# Patient Record
Sex: MALE | Race: OTHER | HISPANIC OR LATINO | URBAN - METROPOLITAN AREA
[De-identification: names, ages, dates, MRNs, and addresses within clinical notes are randomized per-mention and may not be internally consistent; named-entity substitution may affect disease eponyms.]

---

## 2017-05-24 ENCOUNTER — EMERGENCY (EMERGENCY)
Facility: HOSPITAL | Age: 82
LOS: 1 days | Discharge: HOME | End: 2017-05-24
Attending: EMERGENCY MEDICINE | Admitting: EMERGENCY MEDICINE
Payer: MEDICARE

## 2017-05-24 VITALS
SYSTOLIC BLOOD PRESSURE: 127 MMHG | HEART RATE: 63 BPM | OXYGEN SATURATION: 98 % | DIASTOLIC BLOOD PRESSURE: 53 MMHG | RESPIRATION RATE: 17 BRPM

## 2017-05-24 VITALS
TEMPERATURE: 98 F | OXYGEN SATURATION: 100 % | RESPIRATION RATE: 18 BRPM | DIASTOLIC BLOOD PRESSURE: 70 MMHG | SYSTOLIC BLOOD PRESSURE: 145 MMHG | HEART RATE: 69 BPM

## 2017-05-24 LAB
ALBUMIN SERPL ELPH-MCNC: 4 G/DL — SIGNIFICANT CHANGE UP (ref 3.3–5)
ALP SERPL-CCNC: 56 U/L — SIGNIFICANT CHANGE UP (ref 40–120)
ALT FLD-CCNC: 20 U/L — SIGNIFICANT CHANGE UP (ref 4–41)
AST SERPL-CCNC: 19 U/L — SIGNIFICANT CHANGE UP (ref 4–40)
BASOPHILS # BLD AUTO: 0.08 K/UL — SIGNIFICANT CHANGE UP (ref 0–0.2)
BASOPHILS NFR BLD AUTO: 1 % — SIGNIFICANT CHANGE UP (ref 0–2)
BILIRUB SERPL-MCNC: 0.4 MG/DL — SIGNIFICANT CHANGE UP (ref 0.2–1.2)
BUN SERPL-MCNC: 16 MG/DL — SIGNIFICANT CHANGE UP (ref 7–23)
CALCIUM SERPL-MCNC: 8.9 MG/DL — SIGNIFICANT CHANGE UP (ref 8.4–10.5)
CHLORIDE SERPL-SCNC: 106 MMOL/L — SIGNIFICANT CHANGE UP (ref 98–107)
CK SERPL-CCNC: 158 U/L — SIGNIFICANT CHANGE UP (ref 30–200)
CO2 SERPL-SCNC: 26 MMOL/L — SIGNIFICANT CHANGE UP (ref 22–31)
CREAT SERPL-MCNC: 1.1 MG/DL — SIGNIFICANT CHANGE UP (ref 0.5–1.3)
EOSINOPHIL # BLD AUTO: 0.18 K/UL — SIGNIFICANT CHANGE UP (ref 0–0.5)
EOSINOPHIL NFR BLD AUTO: 2.3 % — SIGNIFICANT CHANGE UP (ref 0–6)
GLUCOSE SERPL-MCNC: 119 MG/DL — HIGH (ref 70–99)
HCT VFR BLD CALC: 40.6 % — SIGNIFICANT CHANGE UP (ref 39–50)
HGB BLD-MCNC: 13.4 G/DL — SIGNIFICANT CHANGE UP (ref 13–17)
IMM GRANULOCYTES NFR BLD AUTO: 0.1 % — SIGNIFICANT CHANGE UP (ref 0–1.5)
LYMPHOCYTES # BLD AUTO: 1.51 K/UL — SIGNIFICANT CHANGE UP (ref 1–3.3)
LYMPHOCYTES # BLD AUTO: 19.3 % — SIGNIFICANT CHANGE UP (ref 13–44)
MAGNESIUM SERPL-MCNC: 1.9 MG/DL — SIGNIFICANT CHANGE UP (ref 1.6–2.6)
MCHC RBC-ENTMCNC: 33 % — SIGNIFICANT CHANGE UP (ref 32–36)
MCHC RBC-ENTMCNC: 33 PG — SIGNIFICANT CHANGE UP (ref 27–34)
MCV RBC AUTO: 100 FL — SIGNIFICANT CHANGE UP (ref 80–100)
MONOCYTES # BLD AUTO: 0.52 K/UL — SIGNIFICANT CHANGE UP (ref 0–0.9)
MONOCYTES NFR BLD AUTO: 6.6 % — SIGNIFICANT CHANGE UP (ref 2–14)
NEUTROPHILS # BLD AUTO: 5.53 K/UL — SIGNIFICANT CHANGE UP (ref 1.8–7.4)
NEUTROPHILS NFR BLD AUTO: 70.7 % — SIGNIFICANT CHANGE UP (ref 43–77)
PLATELET # BLD AUTO: 198 K/UL — SIGNIFICANT CHANGE UP (ref 150–400)
PMV BLD: 9.2 FL — SIGNIFICANT CHANGE UP (ref 7–13)
POTASSIUM SERPL-MCNC: 5.1 MMOL/L — SIGNIFICANT CHANGE UP (ref 3.5–5.3)
POTASSIUM SERPL-SCNC: 5.1 MMOL/L — SIGNIFICANT CHANGE UP (ref 3.5–5.3)
PROT SERPL-MCNC: 6.8 G/DL — SIGNIFICANT CHANGE UP (ref 6–8.3)
RBC # BLD: 4.06 M/UL — LOW (ref 4.2–5.8)
RBC # FLD: 12.7 % — SIGNIFICANT CHANGE UP (ref 10.3–14.5)
SODIUM SERPL-SCNC: 144 MMOL/L — SIGNIFICANT CHANGE UP (ref 135–145)
WBC # BLD: 7.83 K/UL — SIGNIFICANT CHANGE UP (ref 3.8–10.5)
WBC # FLD AUTO: 7.83 K/UL — SIGNIFICANT CHANGE UP (ref 3.8–10.5)

## 2017-05-24 PROCEDURE — 99285 EMERGENCY DEPT VISIT HI MDM: CPT | Mod: GC

## 2017-05-24 NOTE — ED ADULT TRIAGE NOTE - CHIEF COMPLAINT QUOTE
Pt c/o pain "all over", dizziness and fatigue x a few months. Pt c/o pain to upper back, b/l arms, b/l legs, b/l shoulders. Denies pain to chest area. Denies SOB. Intermittent dizziness. PMH arthritis, chronic back pain.

## 2017-05-24 NOTE — ED PROVIDER NOTE - MEDICAL DECISION MAKING DETAILS
pt with months of chronic diffuse joint pain with no change, no systemic symptoms. likely arthrosios. pt did not want xrays. will do labs, ck. if neg, will have him f/u with rheum and pmd.

## 2017-05-24 NOTE — ED PROVIDER NOTE - PROGRESS NOTE DETAILS
labs neg. d/c with pmd f/u.  The patient was given verbal and written discharge instructions. Specifically, instructions when to return to the ED and when to seek follow-up from their pcp was discussed. Any specialty follow-up was discussed, including how to make an appointment.  Instructions were discussed in simple, plain language and was understood by the patient. The patient understands that should their symptoms worsen or any new symptoms arise, they should return to the ED immediately for further evaluation.

## 2017-05-24 NOTE — ED ADULT NURSE NOTE - OBJECTIVE STATEMENT
81 years old male presents with complaints of chronic generalized pain that has been getting worse for the past week. Denies fever, chills or recent travel. On arrival to room 3, patient is alert and oriented x 4, appears comfortable, no use of accessory muscles noted, normal sinus rhythm on cardiac monitor, abdomen soft and nontender, skin is intact. No IV access needed as per Dr Mcneill. Blood drawn and sent. Will follow up and monitor.

## 2017-05-24 NOTE — ED PROVIDER NOTE - PMH
BPH (benign prostatic hypertrophy)    Chronic back pain    Herniated disc    Histoplasmosis    Hypertension    Hypothyroid    Insomnia    Renal cysts, acquired, bilateral

## 2017-05-24 NOTE — ED PROVIDER NOTE - OBJECTIVE STATEMENT
81m, pmhx pe on xarelto, htn, chronic back pain with lumbar disc arthrosis (last MRI 4 months ago). c/o years of intermittent joint pains, in b/l shoulders, hips and knees. intermittent x years but more frequent x 2 months. went to pain mgmt, had spine injection w/o relief. went to rheum, had some blood work that the pt has with him, all neg and was referred to a different MD (doesn't know what specialist). no acute change, but presents today to try to find out what is causing the pain. takes tylenol intermittently that sometimes helps and sometimes doesn't. no f/c, night sweats or wt loss. on ROS, only endorses 2 months of constipation, but no surgical history. no dizziness.

## 2018-02-08 ENCOUNTER — APPOINTMENT (OUTPATIENT)
Dept: SURGERY | Facility: CLINIC | Age: 83
End: 2018-02-08
Payer: MEDICARE

## 2018-02-08 VITALS
TEMPERATURE: 98.2 F | HEART RATE: 79 BPM | WEIGHT: 200 LBS | BODY MASS INDEX: 32.14 KG/M2 | SYSTOLIC BLOOD PRESSURE: 125 MMHG | HEIGHT: 66 IN | DIASTOLIC BLOOD PRESSURE: 70 MMHG | OXYGEN SATURATION: 96 %

## 2018-02-08 PROCEDURE — 99203 OFFICE O/P NEW LOW 30 MIN: CPT

## 2018-02-26 ENCOUNTER — OUTPATIENT (OUTPATIENT)
Dept: OUTPATIENT SERVICES | Facility: HOSPITAL | Age: 83
LOS: 1 days | End: 2018-02-26
Payer: COMMERCIAL

## 2018-02-26 VITALS
HEART RATE: 85 BPM | RESPIRATION RATE: 16 BRPM | OXYGEN SATURATION: 96 % | HEIGHT: 66 IN | SYSTOLIC BLOOD PRESSURE: 158 MMHG | DIASTOLIC BLOOD PRESSURE: 80 MMHG | TEMPERATURE: 98 F | WEIGHT: 199.96 LBS

## 2018-02-26 DIAGNOSIS — Z98.890 OTHER SPECIFIED POSTPROCEDURAL STATES: Chronic | ICD-10-CM

## 2018-02-26 DIAGNOSIS — Z01.818 ENCOUNTER FOR OTHER PREPROCEDURAL EXAMINATION: ICD-10-CM

## 2018-02-26 DIAGNOSIS — K81.9 CHOLECYSTITIS, UNSPECIFIED: ICD-10-CM

## 2018-02-26 DIAGNOSIS — M95.0 ACQUIRED DEFORMITY OF NOSE: Chronic | ICD-10-CM

## 2018-02-26 NOTE — H&P PST ADULT - NSANTHOSAYNRD_GEN_A_CORE
No. BUTCH screening performed.  STOP BANG Legend: 0-2 = LOW Risk; 3-4 = INTERMEDIATE Risk; 5-8 = HIGH Risk

## 2018-02-26 NOTE — H&P PST ADULT - PMH
BPH (benign prostatic hypertrophy)    Chronic back pain    Herniated disc    Histoplasmosis    Hypertension    Hypothyroid    Insomnia    Other pulmonary embolism without acute cor pulmonale, unspecified chronicity  off Xarelto x 2 years  Renal cysts, acquired, bilateral    Varicose veins

## 2018-02-26 NOTE — H&P PST ADULT - PROBLEM SELECTOR PLAN 1
Scheduled for laparoscopic cholecystectomy, possible open, possible cholangiogram on 2/28/2018. Preoperative instructions discussed and given to patient. Verbalized understanding of instructions

## 2018-02-26 NOTE — H&P PST ADULT - PSH
H/O surgical biopsy  lungs bilaterally  Nasal deviation  repaired  No significant past surgical history

## 2018-02-26 NOTE — H&P PST ADULT - NEGATIVE GENERAL SYMPTOMS
no chills/no sweating/no anorexia/no fever/no fatigue/no polyuria/no weight loss/no weight gain/no malaise/no polyphagia/no polydipsia

## 2018-02-26 NOTE — H&P PST ADULT - HISTORY OF PRESENT ILLNESS
83 y/o male with PMH of hypertension, hypothyroidism, [place] right ear, varicose veins (LLE), PE (right lung) now off Xarelto since 2015, osteoarthritis of multiple joints, BPH is here for presurgical evaluation. Complains of episodic pain "around the waist" radiating to lower back worsening over the past year. He was diagnosed with cholecystitis and is scheduled for laparoscopic cholecystectomy, possible open, possible cholangiogram on 2/28/2018

## 2018-02-26 NOTE — H&P PST ADULT - ASSESSMENT
83 y/o male with PMH of hypertension, hypothyroidism, [place] right ear, varicose veins (LLE), PE (right lung) now off Xarelto since 2015, osteoarthritis of multiple joints, BPH diagnosed with cholecystitis scheduled for laparoscopic cholecystectomy, possible open, possible cholangiogram on 2/28/2018. Patient is at moderate to high risk for planned procedure

## 2018-02-26 NOTE — H&P PST ADULT - RS GEN PE MLT RESP DETAILS PC
good air movement/no chest wall tenderness/no intercostal retractions/no rales/no wheezes/no rhonchi/no subcutaneous emphysema/breath sounds equal/airway patent/respirations non-labored/clear to auscultation bilaterally/normal

## 2018-02-26 NOTE — H&P PST ADULT - NEGATIVE CARDIOVASCULAR SYMPTOMS
no claudication/no dyspnea on exertion/no peripheral edema/no palpitations/no orthopnea/no chest pain/no paroxysmal nocturnal dyspnea

## 2018-02-28 ENCOUNTER — TRANSCRIPTION ENCOUNTER (OUTPATIENT)
Age: 83
End: 2018-02-28

## 2018-02-28 ENCOUNTER — INPATIENT (INPATIENT)
Facility: HOSPITAL | Age: 83
LOS: 0 days | Discharge: HOME | DRG: 419 | End: 2018-03-01
Attending: SPECIALIST | Admitting: SPECIALIST
Payer: COMMERCIAL

## 2018-02-28 ENCOUNTER — RESULT REVIEW (OUTPATIENT)
Age: 83
End: 2018-02-28

## 2018-02-28 VITALS
OXYGEN SATURATION: 96 % | HEART RATE: 96 BPM | RESPIRATION RATE: 17 BRPM | WEIGHT: 199.96 LBS | TEMPERATURE: 99 F | HEIGHT: 66 IN | DIASTOLIC BLOOD PRESSURE: 64 MMHG | SYSTOLIC BLOOD PRESSURE: 164 MMHG

## 2018-02-28 DIAGNOSIS — K81.9 CHOLECYSTITIS, UNSPECIFIED: ICD-10-CM

## 2018-02-28 DIAGNOSIS — Z98.890 OTHER SPECIFIED POSTPROCEDURAL STATES: Chronic | ICD-10-CM

## 2018-02-28 DIAGNOSIS — M95.0 ACQUIRED DEFORMITY OF NOSE: Chronic | ICD-10-CM

## 2018-02-28 PROCEDURE — G0463: CPT

## 2018-02-28 PROCEDURE — 47563 LAPARO CHOLECYSTECTOMY/GRAPH: CPT

## 2018-02-28 PROCEDURE — 49585: CPT | Mod: AS

## 2018-02-28 PROCEDURE — 86850 RBC ANTIBODY SCREEN: CPT

## 2018-02-28 PROCEDURE — 49585: CPT | Mod: 59

## 2018-02-28 PROCEDURE — 86901 BLOOD TYPING SEROLOGIC RH(D): CPT

## 2018-02-28 PROCEDURE — 88304 TISSUE EXAM BY PATHOLOGIST: CPT | Mod: 26

## 2018-02-28 PROCEDURE — 86900 BLOOD TYPING SEROLOGIC ABO: CPT

## 2018-02-28 PROCEDURE — 47563 LAPARO CHOLECYSTECTOMY/GRAPH: CPT | Mod: AS

## 2018-02-28 RX ORDER — ACETAMINOPHEN WITH CODEINE 300MG-30MG
1 TABLET ORAL EVERY 4 HOURS
Qty: 0 | Refills: 0 | Status: DISCONTINUED | OUTPATIENT
Start: 2018-02-28 | End: 2018-03-01

## 2018-02-28 RX ORDER — LOSARTAN POTASSIUM 100 MG/1
100 TABLET, FILM COATED ORAL DAILY
Qty: 0 | Refills: 0 | Status: DISCONTINUED | OUTPATIENT
Start: 2018-03-01 | End: 2018-03-01

## 2018-02-28 RX ORDER — SODIUM CHLORIDE 9 MG/ML
3 INJECTION INTRAMUSCULAR; INTRAVENOUS; SUBCUTANEOUS EVERY 8 HOURS
Qty: 0 | Refills: 0 | Status: DISCONTINUED | OUTPATIENT
Start: 2018-02-28 | End: 2018-02-28

## 2018-02-28 RX ORDER — TAMSULOSIN HYDROCHLORIDE 0.4 MG/1
0.4 CAPSULE ORAL ONCE
Qty: 0 | Refills: 0 | Status: COMPLETED | OUTPATIENT
Start: 2018-02-28 | End: 2018-02-28

## 2018-02-28 RX ORDER — DILTIAZEM HCL 120 MG
180 CAPSULE, EXT RELEASE 24 HR ORAL DAILY
Qty: 0 | Refills: 0 | Status: DISCONTINUED | OUTPATIENT
Start: 2018-03-01 | End: 2018-03-01

## 2018-02-28 RX ORDER — ONDANSETRON 8 MG/1
4 TABLET, FILM COATED ORAL ONCE
Qty: 0 | Refills: 0 | Status: DISCONTINUED | OUTPATIENT
Start: 2018-02-28 | End: 2018-03-01

## 2018-02-28 RX ORDER — SODIUM CHLORIDE 9 MG/ML
1000 INJECTION INTRAMUSCULAR; INTRAVENOUS; SUBCUTANEOUS
Qty: 0 | Refills: 0 | Status: DISCONTINUED | OUTPATIENT
Start: 2018-02-28 | End: 2018-03-01

## 2018-02-28 RX ORDER — ACETAMINOPHEN WITH CODEINE 300MG-30MG
1 TABLET ORAL
Qty: 6 | Refills: 0
Start: 2018-02-28

## 2018-02-28 RX ORDER — HYDROMORPHONE HYDROCHLORIDE 2 MG/ML
0.5 INJECTION INTRAMUSCULAR; INTRAVENOUS; SUBCUTANEOUS
Qty: 0 | Refills: 0 | Status: DISCONTINUED | OUTPATIENT
Start: 2018-02-28 | End: 2018-03-01

## 2018-02-28 RX ORDER — TAMSULOSIN HYDROCHLORIDE 0.4 MG/1
0.4 CAPSULE ORAL ONCE
Qty: 0 | Refills: 0 | Status: DISCONTINUED | OUTPATIENT
Start: 2018-03-01 | End: 2018-03-01

## 2018-02-28 RX ORDER — ACETAMINOPHEN WITH CODEINE 300MG-30MG
1 TABLET ORAL EVERY 4 HOURS
Qty: 0 | Refills: 0 | Status: DISCONTINUED | OUTPATIENT
Start: 2018-03-01 | End: 2018-03-01

## 2018-02-28 RX ORDER — TAMSULOSIN HYDROCHLORIDE 0.4 MG/1
0.4 CAPSULE ORAL AT BEDTIME
Qty: 0 | Refills: 0 | Status: DISCONTINUED | OUTPATIENT
Start: 2018-03-01 | End: 2018-03-01

## 2018-02-28 RX ADMIN — HYDROMORPHONE HYDROCHLORIDE 0.5 MILLIGRAM(S): 2 INJECTION INTRAMUSCULAR; INTRAVENOUS; SUBCUTANEOUS at 13:00

## 2018-02-28 RX ADMIN — Medication 1 TABLET(S): at 21:35

## 2018-02-28 RX ADMIN — HYDROMORPHONE HYDROCHLORIDE 0.5 MILLIGRAM(S): 2 INJECTION INTRAMUSCULAR; INTRAVENOUS; SUBCUTANEOUS at 13:30

## 2018-02-28 RX ADMIN — TAMSULOSIN HYDROCHLORIDE 0.4 MILLIGRAM(S): 0.4 CAPSULE ORAL at 17:02

## 2018-02-28 NOTE — CHART NOTE - NSCHARTNOTEFT_GEN_A_CORE
Informed by nursing pt not voided yet. pt took his flomax 1 hr ago. pt denies sensation to void.  No pain. Nurse reports bladder scan resulted in 300cc urine in bladder  Considering pt not sensation to void yet, and bladder capacity is not yet reached  (bladder capacity approx 500-600cc)   would push po fluids and wait for pt to void.  if pt unable to void in 60 to 90 minutes, informed him that he may need a mac and leg bag for discharge to home tonight.  signed out to covering surgical PA.

## 2018-02-28 NOTE — ASU DISCHARGE PLAN (ADULT/PEDIATRIC). - MEDICATION SUMMARY - MEDICATIONS TO TAKE
I will START or STAY ON the medications listed below when I get home from the hospital:    acetaminophen-codeine 300 mg-30 mg oral tablet  -- 1 tab(s) by mouth every 4 hours, As needed, Moderate Pain (4 - 6) MDD:4  -- Indication: For if pain    losartan 100 mg oral tablet  -- 1 tab(s) by mouth once a day  -- Indication: For .    tamsulosin 0.4 mg oral capsule  -- 1 cap(s) by mouth once a day  -- Indication: For .    dilTIAZem 180 mg/24 hours oral capsule, extended release  -- 1 cap(s) by mouth once a day  -- Indication: For .    zolpidem 10 mg oral tablet  -- 1 tab(s) by mouth once a day (at bedtime), As needed, Insomnia  -- Indication: For .    levothyroxine 100 mcg (0.1 mg) oral tablet  -- 1 tab(s) by mouth once a day  -- Indication: For .

## 2018-03-01 ENCOUNTER — TRANSCRIPTION ENCOUNTER (OUTPATIENT)
Age: 83
End: 2018-03-01

## 2018-03-01 VITALS
HEART RATE: 79 BPM | SYSTOLIC BLOOD PRESSURE: 143 MMHG | OXYGEN SATURATION: 96 % | DIASTOLIC BLOOD PRESSURE: 64 MMHG | RESPIRATION RATE: 18 BRPM | TEMPERATURE: 99 F

## 2018-03-01 PROCEDURE — 76000 FLUOROSCOPY <1 HR PHYS/QHP: CPT

## 2018-03-01 PROCEDURE — 88304 TISSUE EXAM BY PATHOLOGIST: CPT

## 2018-03-01 RX ORDER — PANTOPRAZOLE SODIUM 20 MG/1
40 TABLET, DELAYED RELEASE ORAL
Qty: 0 | Refills: 0 | Status: DISCONTINUED | OUTPATIENT
Start: 2018-03-01 | End: 2018-03-01

## 2018-03-01 RX ORDER — ZOLPIDEM TARTRATE 10 MG/1
5 TABLET ORAL ONCE
Qty: 0 | Refills: 0 | Status: DISCONTINUED | OUTPATIENT
Start: 2018-03-01 | End: 2018-03-01

## 2018-03-01 RX ORDER — SODIUM CHLORIDE 9 MG/ML
1000 INJECTION, SOLUTION INTRAVENOUS
Qty: 0 | Refills: 0 | Status: DISCONTINUED | OUTPATIENT
Start: 2018-03-01 | End: 2018-03-01

## 2018-03-01 RX ORDER — TAMSULOSIN HYDROCHLORIDE 0.4 MG/1
2 CAPSULE ORAL
Qty: 60 | Refills: 0
Start: 2018-03-01 | End: 2018-03-30

## 2018-03-01 RX ORDER — LEVOTHYROXINE SODIUM 125 MCG
100 TABLET ORAL DAILY
Qty: 0 | Refills: 0 | Status: DISCONTINUED | OUTPATIENT
Start: 2018-03-01 | End: 2018-03-01

## 2018-03-01 RX ADMIN — Medication 100 MICROGRAM(S): at 12:20

## 2018-03-01 RX ADMIN — PANTOPRAZOLE SODIUM 40 MILLIGRAM(S): 20 TABLET, DELAYED RELEASE ORAL at 12:20

## 2018-03-01 RX ADMIN — ZOLPIDEM TARTRATE 5 MILLIGRAM(S): 10 TABLET ORAL at 00:57

## 2018-03-01 RX ADMIN — Medication 180 MILLIGRAM(S): at 06:25

## 2018-03-01 RX ADMIN — LOSARTAN POTASSIUM 100 MILLIGRAM(S): 100 TABLET, FILM COATED ORAL at 06:25

## 2018-03-01 NOTE — DISCHARGE NOTE ADULT - PLAN OF CARE
wound healing May shower. Remove dressing in 48 hrs. Leave steristrips intact. Resume regular diet. No heavy lifting, straining, exercises for 2 weeks. increase Flomax to 2 capsule daily  follow up with Dr. Ayala 3/6/18  Keep mac to leg bag, drain when full. Record amount each time. con't med

## 2018-03-01 NOTE — DISCHARGE NOTE ADULT - PATIENT PORTAL LINK FT
You can access the [facility] Patient Portal, offered by [facility], by registering with the following website: [email or web address]

## 2018-03-01 NOTE — PROGRESS NOTE ADULT - ASSESSMENT
83 y/o Male s/p lap hal umbilical hernia repair 2/28, post op urinary retention     -reg diet   -c/w mac   -OOB/Ambulate   -DVT ppx   -Dc planning w/ mac catheter

## 2018-03-01 NOTE — DISCHARGE NOTE ADULT - CARE PLAN
Principal Discharge DX:	Cholelithiasis  Goal:	wound healing  Assessment and plan of treatment:	May shower. Remove dressing in 48 hrs. Leave steristrips intact. Resume regular diet. No heavy lifting, straining, exercises for 2 weeks.  Secondary Diagnosis:	BPH (benign prostatic hyperplasia)  Assessment and plan of treatment:	increase Flomax to 2 capsule daily  follow up with Dr. Ayala 3/6/18  Keep mac to leg bag, drain when full. Record amount each time.  Secondary Diagnosis:	Hypertension  Assessment and plan of treatment:	con't med  Secondary Diagnosis:	Hypothyroidism  Assessment and plan of treatment:	con't med

## 2018-03-01 NOTE — PROGRESS NOTE ADULT - SUBJECTIVE AND OBJECTIVE BOX
INTERVAL HPI/OVERNIGHT EVENTS:  Pt stable.   Tolerating diet.   flatus/BM.  Pizarro in place    Vital Signs Last 24 Hrs  T(C): 36.4 (01 Mar 2018 06:46), Max: 97.4 (28 Feb 2018 14:04)  T(F): 97.6 (01 Mar 2018 06:46), Max: 98 (28 Feb 2018 12:07)  HR: 72 (01 Mar 2018 06:46) (70 - 98)  BP: 120/54 (01 Mar 2018 06:46) (106/64 - 146/65)  BP(mean): 78 (28 Feb 2018 13:30) (74 - 78)  RR: 18 (01 Mar 2018 06:46) (10 - 21)  SpO2: 98% (01 Mar 2018 06:46) (95% - 100%)    Physical:  Abdomen: Soft nondistended, nontender.  Port sites clean  No complaints    I&O's Summary    28 Feb 2018 07:01  -  01 Mar 2018 07:00  --------------------------------------------------------  IN: 1820 mL / OUT: 1000 mL / NET: 820 mL        LABS:

## 2018-03-01 NOTE — DISCHARGE NOTE ADULT - HOSPITAL COURSE
82 y.o. M with PMH significant for BPH underwent uncomplicated laparoscopic cholecystectomy 2/28/18. Post op patient was unable to void, mac catheter placed with 1L UO. Pt was schedule to see urologist Dr. Ayala 3/13/18. Dr. Ayala saw pt at bedside and recommended d/c home with mac to leg bag, increase Flomax to 0.8mg daily, and follow up 3/6/18.

## 2018-03-01 NOTE — PROGRESS NOTE ADULT - SUBJECTIVE AND OBJECTIVE BOX
INTERVAL HPI/OVERNIGHT EVENTS:    Pt s/p lap hal umbilical hernia repair 2/28, seen and examined at bedside. Admits to incisional pain, denies n/v. on reg diet tolerating well.     Vital Signs Last 24 Hrs  T(C): 36.4 (01 Mar 2018 06:46), Max: 97.4 (28 Feb 2018 14:04)  T(F): 97.6 (01 Mar 2018 06:46), Max: 98 (28 Feb 2018 12:07)  HR: 72 (01 Mar 2018 06:46) (70 - 98)  BP: 120/54 (01 Mar 2018 06:46) (106/64 - 146/65)  BP(mean): 78 (28 Feb 2018 13:30) (74 - 78)  RR: 18 (01 Mar 2018 06:46) (10 - 21)  SpO2: 98% (01 Mar 2018 06:46) (95% - 100%)  I&O's Detail    28 Feb 2018 07:01  -  01 Mar 2018 07:00  --------------------------------------------------------  IN:    IV PiggyBack: 1150 mL    Oral Fluid: 620 mL    sodium chloride 0.9%.: 50 mL  Total IN: 1820 mL    OUT:    Indwelling Catheter - Urethral: 1000 mL  Total OUT: 1000 mL    Total NET: 820 mL      Physical Exam  General: AAOx3, No acute distress  Skin: No jaundice, no icterus  Abdomen: soft,  nondistended, incisional TTP  : Normal external genitalia, mac in place, UO yellow   Extremities: non edematous, no calf pain bilaterally

## 2018-03-01 NOTE — DISCHARGE NOTE ADULT - MEDICATION SUMMARY - MEDICATIONS TO CHANGE
I will SWITCH the dose or number of times a day I take the medications listed below when I get home from the hospital:    tamsulosin 0.4 mg oral capsule  -- 1 cap(s) by mouth once a day

## 2018-03-01 NOTE — DISCHARGE NOTE ADULT - MEDICATION SUMMARY - MEDICATIONS TO TAKE
I will START or STAY ON the medications listed below when I get home from the hospital:    acetaminophen-codeine 300 mg-30 mg oral tablet  -- 1 tab(s) by mouth every 4 hours, As needed, Moderate Pain (4 - 6) MDD:4  -- Indication: For Moderate pain    losartan 100 mg oral tablet  -- 1 tab(s) by mouth once a day  -- Indication: For HTN    tamsulosin 0.4 mg oral capsule  -- 2 cap(s) by mouth once a day   -- It is very important that you take or use this exactly as directed.  Do not skip doses or discontinue unless directed by your doctor.  May cause drowsiness.  Alcohol may intensify this effect.  Use care when operating dangerous machinery.  Some non-prescription drugs may aggravate your condition.  Read all labels carefully.  If a warning appears, check with your doctor before taking.  Swallow whole.  Do not crush.  Take with food or milk.    -- Indication: For Urinary retention    dilTIAZem 180 mg/24 hours oral capsule, extended release  -- 1 cap(s) by mouth once a day  -- Indication: For per pmd    zolpidem 10 mg oral tablet  -- 1 tab(s) by mouth once a day (at bedtime), As needed, Insomnia  -- Indication: For Insomnia    levothyroxine 100 mcg (0.1 mg) oral tablet  -- 1 tab(s) by mouth once a day  -- Indication: For Hypothyroidism

## 2018-03-05 LAB — SURGICAL PATHOLOGY FINAL REPORT - CH: SIGNIFICANT CHANGE UP

## 2018-03-06 ENCOUNTER — APPOINTMENT (OUTPATIENT)
Dept: UROLOGY | Facility: CLINIC | Age: 83
End: 2018-03-06
Payer: MEDICARE

## 2018-03-06 VITALS
OXYGEN SATURATION: 98 % | DIASTOLIC BLOOD PRESSURE: 78 MMHG | BODY MASS INDEX: 32.14 KG/M2 | WEIGHT: 200 LBS | TEMPERATURE: 98 F | HEART RATE: 106 BPM | HEIGHT: 66 IN | RESPIRATION RATE: 18 BRPM | SYSTOLIC BLOOD PRESSURE: 136 MMHG

## 2018-03-06 DIAGNOSIS — Z82.49 FAMILY HISTORY OF ISCHEMIC HEART DISEASE AND OTHER DISEASES OF THE CIRCULATORY SYSTEM: ICD-10-CM

## 2018-03-06 DIAGNOSIS — N40.0 BENIGN PROSTATIC HYPERPLASIA WITHOUT LOWER URINARY TRACT SYMPMS: ICD-10-CM

## 2018-03-06 PROCEDURE — 99204 OFFICE O/P NEW MOD 45 MIN: CPT

## 2018-03-06 RX ORDER — ZOLPIDEM TARTRATE 10 MG/1
10 TABLET ORAL
Refills: 0 | Status: ACTIVE | COMMUNITY

## 2018-03-06 RX ORDER — LEVOTHYROXINE SODIUM 0.1 MG/1
100 TABLET ORAL
Refills: 0 | Status: ACTIVE | COMMUNITY

## 2018-03-06 RX ORDER — LOSARTAN POTASSIUM 100 MG/1
100 TABLET, FILM COATED ORAL
Refills: 0 | Status: ACTIVE | COMMUNITY

## 2018-03-06 RX ORDER — DILTIAZEM HYDROCHLORIDE 180 MG/1
180 CAPSULE, EXTENDED RELEASE ORAL
Refills: 0 | Status: ACTIVE | COMMUNITY

## 2018-03-13 ENCOUNTER — APPOINTMENT (OUTPATIENT)
Dept: UROLOGY | Facility: CLINIC | Age: 83
End: 2018-03-13
Payer: MEDICARE

## 2018-03-13 VITALS
WEIGHT: 195 LBS | SYSTOLIC BLOOD PRESSURE: 138 MMHG | HEART RATE: 94 BPM | HEIGHT: 66 IN | BODY MASS INDEX: 31.34 KG/M2 | TEMPERATURE: 97.6 F | DIASTOLIC BLOOD PRESSURE: 64 MMHG

## 2018-03-13 DIAGNOSIS — Z87.19 PERSONAL HISTORY OF OTHER DISEASES OF THE DIGESTIVE SYSTEM: ICD-10-CM

## 2018-03-13 PROCEDURE — 99214 OFFICE O/P EST MOD 30 MIN: CPT

## 2018-03-14 LAB
APPEARANCE: ABNORMAL
BACTERIA: ABNORMAL
BILIRUBIN URINE: NEGATIVE
BLOOD URINE: NEGATIVE
COLOR: ABNORMAL
GLUCOSE QUALITATIVE U: NEGATIVE MG/DL
HYALINE CASTS: 4 /LPF
KETONES URINE: NEGATIVE
LEUKOCYTE ESTERASE URINE: ABNORMAL
MICROSCOPIC-UA: NORMAL
NITRITE URINE: POSITIVE
PH URINE: >8.5
PROTEIN URINE: 30 MG/DL
RED BLOOD CELLS URINE: 2 /HPF
SPECIFIC GRAVITY URINE: 1.02
SQUAMOUS EPITHELIAL CELLS: 0 /HPF
UROBILINOGEN URINE: 1 MG/DL
WHITE BLOOD CELLS URINE: 8 /HPF

## 2018-03-15 ENCOUNTER — APPOINTMENT (OUTPATIENT)
Dept: SURGERY | Facility: CLINIC | Age: 83
End: 2018-03-15
Payer: MEDICARE

## 2018-03-15 VITALS
HEIGHT: 66 IN | BODY MASS INDEX: 31.34 KG/M2 | SYSTOLIC BLOOD PRESSURE: 127 MMHG | WEIGHT: 195 LBS | DIASTOLIC BLOOD PRESSURE: 73 MMHG | TEMPERATURE: 97.3 F | HEART RATE: 94 BPM

## 2018-03-15 PROCEDURE — 99024 POSTOP FOLLOW-UP VISIT: CPT

## 2018-03-16 ENCOUNTER — RESULT REVIEW (OUTPATIENT)
Age: 83
End: 2018-03-16

## 2018-03-16 LAB — BACTERIA UR CULT: ABNORMAL

## 2018-03-27 ENCOUNTER — APPOINTMENT (OUTPATIENT)
Dept: UROLOGY | Facility: CLINIC | Age: 83
End: 2018-03-27
Payer: MEDICARE

## 2018-03-27 VITALS
HEART RATE: 76 BPM | OXYGEN SATURATION: 98 % | SYSTOLIC BLOOD PRESSURE: 132 MMHG | DIASTOLIC BLOOD PRESSURE: 76 MMHG | RESPIRATION RATE: 16 BRPM

## 2018-03-27 DIAGNOSIS — Z86.39 PERSONAL HISTORY OF OTHER ENDOCRINE, NUTRITIONAL AND METABOLIC DISEASE: ICD-10-CM

## 2018-03-27 DIAGNOSIS — R30.0 DYSURIA: ICD-10-CM

## 2018-03-27 DIAGNOSIS — Z87.39 PERSONAL HISTORY OF OTHER DISEASES OF THE MUSCULOSKELETAL SYSTEM AND CONNECTIVE TISSUE: ICD-10-CM

## 2018-03-27 DIAGNOSIS — Z86.79 PERSONAL HISTORY OF OTHER DISEASES OF THE CIRCULATORY SYSTEM: ICD-10-CM

## 2018-03-27 DIAGNOSIS — Z87.19 PERSONAL HISTORY OF OTHER DISEASES OF THE DIGESTIVE SYSTEM: ICD-10-CM

## 2018-03-27 PROCEDURE — 99213 OFFICE O/P EST LOW 20 MIN: CPT

## 2018-03-27 PROCEDURE — 51798 US URINE CAPACITY MEASURE: CPT

## 2019-03-18 PROBLEM — I83.90 ASYMPTOMATIC VARICOSE VEINS OF UNSPECIFIED LOWER EXTREMITY: Chronic | Status: ACTIVE | Noted: 2018-02-26

## 2019-03-18 PROBLEM — I26.99 OTHER PULMONARY EMBOLISM WITHOUT ACUTE COR PULMONALE: Chronic | Status: ACTIVE | Noted: 2018-02-26

## 2019-03-22 ENCOUNTER — APPOINTMENT (OUTPATIENT)
Dept: UROLOGY | Facility: CLINIC | Age: 84
End: 2019-03-22
Payer: MEDICARE

## 2019-03-22 VITALS
DIASTOLIC BLOOD PRESSURE: 71 MMHG | SYSTOLIC BLOOD PRESSURE: 136 MMHG | HEART RATE: 77 BPM | WEIGHT: 190 LBS | BODY MASS INDEX: 30.53 KG/M2 | HEIGHT: 66 IN

## 2019-03-22 DIAGNOSIS — M54.5 LOW BACK PAIN: ICD-10-CM

## 2019-03-22 PROCEDURE — 99214 OFFICE O/P EST MOD 30 MIN: CPT

## 2019-03-22 NOTE — ASSESSMENT
[FreeTextEntry1] : Very pleasant 83-year-old gentleman presents for followup of kidney stones, renal cysts, BPH\par -Recent PSA 3.2\par -We discussed that his risk for prostate cancer is low, given his negative YAEL and low PSA\par -I suggested he undergo a renal ultrasound given his concern for renal cysts and stones\par -Patient reports that he would like to wait at this time\par -Continue Flomax-refill sent to pharmacy\par -We discussed the potential addition of finasteride, however he would not like to add another medication at this time\par -Followup in 6 months

## 2019-03-22 NOTE — HISTORY OF PRESENT ILLNESS
[FreeTextEntry1] : Very pleasant 83-year-old gentleman who presents for followup of renal cysts, renal stones, BPH. Patient reports urinating every 2-3 hours. He does report that he drinks a lot of water because of kidney stones. He reports right flank pain which has been present for many years. He denies dysuria. No hematuria. No specific timing to his symptoms. No aggravating or alleviating factors that he knows of. No other complaints. [Urinary Retention] : no urinary retention [Urinary Urgency] : no urinary urgency [Urinary Frequency] : urinary frequency [Straining] : no straining [Weak Stream] : no weak stream [Dysuria] : no dysuria [Hematuria - Gross] : no gross hematuria [Bladder Spasm] : no bladder spasm [Abdominal Pain] : no abdominal pain [Flank Pain] : no flank pain [Fever] : no fever [Fatigue] : no fatigue [Nausea] : no nausea [Anorexia] : no anorexia

## 2019-05-15 ENCOUNTER — APPOINTMENT (OUTPATIENT)
Dept: UROLOGY | Facility: CLINIC | Age: 84
End: 2019-05-15
Payer: MEDICARE

## 2019-05-15 VITALS
DIASTOLIC BLOOD PRESSURE: 79 MMHG | HEART RATE: 104 BPM | WEIGHT: 190 LBS | BODY MASS INDEX: 30.53 KG/M2 | SYSTOLIC BLOOD PRESSURE: 143 MMHG | HEIGHT: 66 IN

## 2019-05-15 PROCEDURE — 99214 OFFICE O/P EST MOD 30 MIN: CPT

## 2019-05-15 NOTE — PHYSICAL EXAM
[General Appearance - Well Developed] : well developed [General Appearance - Well Nourished] : well nourished [Normal Appearance] : normal appearance [Well Groomed] : well groomed [General Appearance - In No Acute Distress] : no acute distress [Abdomen Tenderness] : non-tender [Abdomen Soft] : soft [Costovertebral Angle Tenderness] : no ~M costovertebral angle tenderness [Urinary Bladder Findings] : the bladder was normal on palpation [Edema] : no peripheral edema [] : no respiratory distress [Respiration, Rhythm And Depth] : normal respiratory rhythm and effort [Exaggerated Use Of Accessory Muscles For Inspiration] : no accessory muscle use [Oriented To Time, Place, And Person] : oriented to person, place, and time [Affect] : the affect was normal [Mood] : the mood was normal [Not Anxious] : not anxious [Normal Station and Gait] : the gait and station were normal for the patient's age [No Focal Deficits] : no focal deficits [No Palpable Adenopathy] : no palpable adenopathy

## 2019-05-15 NOTE — HISTORY OF PRESENT ILLNESS
[FreeTextEntry1] : Very pleasant 83-year-old gentleman presents for followup of kidney cysts, kidney stones, question of a renal mass on recent ultrasound. Patient recently had a renal ultrasound done for persistent flank pain and back pain. This demonstrated a possible 4 mm kidney stone as well as a cortical calcification. It also showed bilateral renal cysts with a concern for a complex cyst versus a renal mass. Previous CT scan from 2016 demonstrated only parapelvic cysts and a cortical calcification. He denies dysuria. No suprapubic pain. No specific timing his symptoms. No other complaints. [Urinary Retention] : no urinary retention [Urinary Urgency] : no urinary urgency [Urinary Frequency] : urinary frequency [Straining] : no straining [Weak Stream] : no weak stream [Dysuria] : no dysuria [Hematuria - Gross] : no gross hematuria [Bladder Spasm] : no bladder spasm [Abdominal Pain] : no abdominal pain [Flank Pain] : no flank pain [Fever] : no fever [Nausea] : no nausea [Fatigue] : no fatigue [Anorexia] : no anorexia

## 2019-05-15 NOTE — ASSESSMENT
[FreeTextEntry1] : Very pleasant 83-year-old gentleman presents for followup of bilateral renal cysts, possible kidney stones, possible renal mass\par -Renal ultrasound images reviewed from [facility]\par -CT images from 2016 reviewed\par -CT renal mass protocol to rule out renal mass and kidney stones\par -Labs from a CT reviewed-creatinine normal, PSA 3.2\par -Follow up in 3 weeks

## 2019-05-29 ENCOUNTER — APPOINTMENT (OUTPATIENT)
Dept: UROLOGY | Facility: CLINIC | Age: 84
End: 2019-05-29
Payer: MEDICARE

## 2019-05-29 VITALS
DIASTOLIC BLOOD PRESSURE: 84 MMHG | HEIGHT: 66 IN | WEIGHT: 190 LBS | HEART RATE: 101 BPM | BODY MASS INDEX: 30.53 KG/M2 | RESPIRATION RATE: 16 BRPM | SYSTOLIC BLOOD PRESSURE: 156 MMHG

## 2019-05-29 PROCEDURE — 99213 OFFICE O/P EST LOW 20 MIN: CPT

## 2019-05-29 NOTE — HISTORY OF PRESENT ILLNESS
[Urinary Retention] : no urinary retention [FreeTextEntry1] : Very pleasant 83-year-old gentleman presents for followup of renal cysts and BPH. Patient feels well except for back pain. He denies dysuria. No hematuria. No overt flank pain. He recently underwent a CT which demonstrated renal cysts and a cortical renal calcification. No renal masses. No kidney stones. [Urinary Frequency] : urinary frequency [Urinary Urgency] : no urinary urgency [Straining] : no straining [Weak Stream] : no weak stream [Hematuria - Gross] : no gross hematuria [Dysuria] : no dysuria [Bladder Spasm] : no bladder spasm [Abdominal Pain] : no abdominal pain [Flank Pain] : no flank pain [Fever] : no fever [Nausea] : no nausea [Fatigue] : no fatigue [Anorexia] : no anorexia

## 2019-05-29 NOTE — PHYSICAL EXAM
[General Appearance - Well Developed] : well developed [General Appearance - Well Nourished] : well nourished [Normal Appearance] : normal appearance [General Appearance - In No Acute Distress] : no acute distress [Well Groomed] : well groomed [Abdomen Soft] : soft [Abdomen Tenderness] : non-tender [Costovertebral Angle Tenderness] : no ~M costovertebral angle tenderness [Urinary Bladder Findings] : the bladder was normal on palpation [Edema] : no peripheral edema [] : no respiratory distress [Respiration, Rhythm And Depth] : normal respiratory rhythm and effort [Exaggerated Use Of Accessory Muscles For Inspiration] : no accessory muscle use [Mood] : the mood was normal [Affect] : the affect was normal [Oriented To Time, Place, And Person] : oriented to person, place, and time [Normal Station and Gait] : the gait and station were normal for the patient's age [Not Anxious] : not anxious [No Focal Deficits] : no focal deficits [No Palpable Adenopathy] : no palpable adenopathy

## 2019-05-29 NOTE — ASSESSMENT
[FreeTextEntry1] : Very pleasant 83-year-old gentleman with renal cysts, renal cortical calcification, and BPH\par -CT images reviewed\par -Follow up in one year with ultrasound prior to visit\par -continue Flomax

## 2019-09-19 ENCOUNTER — EMERGENCY (EMERGENCY)
Facility: HOSPITAL | Age: 84
LOS: 1 days | Discharge: HOME | End: 2019-09-19
Attending: EMERGENCY MEDICINE
Payer: COMMERCIAL

## 2019-09-19 VITALS
TEMPERATURE: 97 F | WEIGHT: 199.96 LBS | RESPIRATION RATE: 20 BRPM | OXYGEN SATURATION: 97 % | DIASTOLIC BLOOD PRESSURE: 72 MMHG | HEART RATE: 73 BPM | HEIGHT: 67 IN | SYSTOLIC BLOOD PRESSURE: 155 MMHG

## 2019-09-19 DIAGNOSIS — M95.0 ACQUIRED DEFORMITY OF NOSE: Chronic | ICD-10-CM

## 2019-09-19 DIAGNOSIS — Z98.890 OTHER SPECIFIED POSTPROCEDURAL STATES: Chronic | ICD-10-CM

## 2019-09-19 PROCEDURE — 72100 X-RAY EXAM L-S SPINE 2/3 VWS: CPT | Mod: 26

## 2019-09-19 PROCEDURE — 99283 EMERGENCY DEPT VISIT LOW MDM: CPT

## 2019-09-19 PROCEDURE — 72100 X-RAY EXAM L-S SPINE 2/3 VWS: CPT

## 2019-09-19 PROCEDURE — 99283 EMERGENCY DEPT VISIT LOW MDM: CPT | Mod: 25

## 2019-09-19 RX ORDER — DIAZEPAM 5 MG
5 TABLET ORAL ONCE
Refills: 0 | Status: DISCONTINUED | OUTPATIENT
Start: 2019-09-19 | End: 2019-09-19

## 2019-09-19 RX ORDER — DIAZEPAM 5 MG
1 TABLET ORAL
Qty: 3 | Refills: 0
Start: 2019-09-19 | End: 2019-09-21

## 2019-09-19 RX ORDER — LIDOCAINE 4 G/100G
1 CREAM TOPICAL ONCE
Refills: 0 | Status: COMPLETED | OUTPATIENT
Start: 2019-09-19 | End: 2019-09-19

## 2019-09-19 RX ADMIN — Medication 5 MILLIGRAM(S): at 18:05

## 2019-09-19 RX ADMIN — LIDOCAINE 1 PATCH: 4 CREAM TOPICAL at 18:05

## 2019-09-19 NOTE — ED PROVIDER NOTE - OBJECTIVE STATEMENT
84 yr old male with hx of cholecystectomy, ventral hernia, PE of xarelto, varicose vein, chronic back pain, herniated disc, BPH, renal cyst, insomnia, hypothyroid and HTN presents to ed c/o b/l lateral lower back pain x 3 days that radiates to lateral thigh down to legs.  nothing makes it worse or better.  at night seems to be at lateral thigh.  no urinary or bowel incontinence, no fever, no abd pain, no n/v, no dysuria, no syncope, no focal weakness, no fall.  pt takes tylenol and naproxen and not helping

## 2019-09-19 NOTE — ED PROVIDER NOTE - CARDIAC, MLM
Normal rate, regular rhythm.  Heart sounds S1, S2.  No murmurs, rubs or gallops. 2+ Dp and femoral pulses

## 2019-09-19 NOTE — ED PROVIDER NOTE - PROGRESS NOTE DETAILS
mcconnell: improve.  pt xr shows spondylosis and vascular calcifications. no acute path  dx chronic back pain.  physical therapy, heat therapy, no heavy lifting. tylenol 650mg every 4 hrs as needed. ambulatory

## 2019-09-19 NOTE — ED PROVIDER NOTE - CLINICAL SUMMARY MEDICAL DECISION MAKING FREE TEXT BOX
84 yr old male with hx of cholecystectomy, ventral hernia, PE of xarelto, varicose vein, chronic back pain, herniated disc, BPH, renal cyst, insomnia, hypothyroid and HTN presents to ed c/o b/l lateral lower back pain x 3 days that radiates to lateral thigh down to legs.  nothing makes it worse or better.  at night seems to be at lateral thigh.  no urinary or bowel incontinence, no fever, no abd pain, no n/v, no dysuria, no syncope, no focal weakness, no fall.  pt takes tylenol and naproxen and not helping    pt with no back pain.  no vascular compromise, no infectious sx, no sign of cord compression.  will check xr and ua, pain meds re-assess likely dc

## 2019-09-19 NOTE — ED PROVIDER NOTE - PATIENT PORTAL LINK FT
You can access the FollowMyHealth Patient Portal offered by [facility] by registering at the following website: [email or web address]. By joining [employer]’s FollowMyHealth portal, you will also be able to view your health information using other applications (apps) compatible with our system.

## 2019-10-10 ENCOUNTER — INPATIENT (INPATIENT)
Facility: HOSPITAL | Age: 84
LOS: 3 days | Discharge: HOME | DRG: 728 | End: 2019-10-14
Attending: STUDENT IN AN ORGANIZED HEALTH CARE EDUCATION/TRAINING PROGRAM | Admitting: STUDENT IN AN ORGANIZED HEALTH CARE EDUCATION/TRAINING PROGRAM
Payer: COMMERCIAL

## 2019-10-10 VITALS
OXYGEN SATURATION: 96 % | SYSTOLIC BLOOD PRESSURE: 145 MMHG | WEIGHT: 190.04 LBS | RESPIRATION RATE: 18 BRPM | HEIGHT: 66 IN | TEMPERATURE: 98 F | HEART RATE: 91 BPM | DIASTOLIC BLOOD PRESSURE: 71 MMHG

## 2019-10-10 DIAGNOSIS — M95.0 ACQUIRED DEFORMITY OF NOSE: Chronic | ICD-10-CM

## 2019-10-10 DIAGNOSIS — Z98.890 OTHER SPECIFIED POSTPROCEDURAL STATES: Chronic | ICD-10-CM

## 2019-10-10 LAB
ALBUMIN SERPL ELPH-MCNC: 2.7 G/DL — LOW (ref 3.5–5)
ALP SERPL-CCNC: 52 U/L — SIGNIFICANT CHANGE UP (ref 40–120)
ALT FLD-CCNC: 20 U/L DA — SIGNIFICANT CHANGE UP (ref 10–60)
ANION GAP SERPL CALC-SCNC: 6 MMOL/L — SIGNIFICANT CHANGE UP (ref 5–17)
APTT BLD: 28.4 SEC — SIGNIFICANT CHANGE UP (ref 27.5–36.3)
AST SERPL-CCNC: 15 U/L — SIGNIFICANT CHANGE UP (ref 10–40)
BASOPHILS # BLD AUTO: 0.04 K/UL — SIGNIFICANT CHANGE UP (ref 0–0.2)
BASOPHILS NFR BLD AUTO: 0.3 % — SIGNIFICANT CHANGE UP (ref 0–2)
BILIRUB SERPL-MCNC: 0.6 MG/DL — SIGNIFICANT CHANGE UP (ref 0.2–1.2)
BUN SERPL-MCNC: 25 MG/DL — HIGH (ref 7–18)
CALCIUM SERPL-MCNC: 8.2 MG/DL — LOW (ref 8.4–10.5)
CHLORIDE SERPL-SCNC: 103 MMOL/L — SIGNIFICANT CHANGE UP (ref 96–108)
CO2 SERPL-SCNC: 27 MMOL/L — SIGNIFICANT CHANGE UP (ref 22–31)
CREAT SERPL-MCNC: 1.45 MG/DL — HIGH (ref 0.5–1.3)
EOSINOPHIL # BLD AUTO: 0.01 K/UL — SIGNIFICANT CHANGE UP (ref 0–0.5)
EOSINOPHIL NFR BLD AUTO: 0.1 % — SIGNIFICANT CHANGE UP (ref 0–6)
GLUCOSE SERPL-MCNC: 169 MG/DL — HIGH (ref 70–99)
HCT VFR BLD CALC: 34.2 % — LOW (ref 39–50)
HGB BLD-MCNC: 11.2 G/DL — LOW (ref 13–17)
IMM GRANULOCYTES NFR BLD AUTO: 0.9 % — SIGNIFICANT CHANGE UP (ref 0–1.5)
INR BLD: 1.13 RATIO — SIGNIFICANT CHANGE UP (ref 0.88–1.16)
LYMPHOCYTES # BLD AUTO: 0.59 K/UL — LOW (ref 1–3.3)
LYMPHOCYTES # BLD AUTO: 4.6 % — LOW (ref 13–44)
MCHC RBC-ENTMCNC: 32.7 GM/DL — SIGNIFICANT CHANGE UP (ref 32–36)
MCHC RBC-ENTMCNC: 33 PG — SIGNIFICANT CHANGE UP (ref 27–34)
MCV RBC AUTO: 100.9 FL — HIGH (ref 80–100)
MONOCYTES # BLD AUTO: 1.05 K/UL — HIGH (ref 0–0.9)
MONOCYTES NFR BLD AUTO: 8.1 % — SIGNIFICANT CHANGE UP (ref 2–14)
NEUTROPHILS # BLD AUTO: 11.12 K/UL — HIGH (ref 1.8–7.4)
NEUTROPHILS NFR BLD AUTO: 86 % — HIGH (ref 43–77)
NRBC # BLD: 0 /100 WBCS — SIGNIFICANT CHANGE UP (ref 0–0)
PLATELET # BLD AUTO: 126 K/UL — LOW (ref 150–400)
POTASSIUM SERPL-MCNC: 3.6 MMOL/L — SIGNIFICANT CHANGE UP (ref 3.5–5.3)
POTASSIUM SERPL-SCNC: 3.6 MMOL/L — SIGNIFICANT CHANGE UP (ref 3.5–5.3)
PROT SERPL-MCNC: 6.4 G/DL — SIGNIFICANT CHANGE UP (ref 6–8.3)
PROTHROM AB SERPL-ACNC: 12.6 SEC — SIGNIFICANT CHANGE UP (ref 10–12.9)
RBC # BLD: 3.39 M/UL — LOW (ref 4.2–5.8)
RBC # FLD: 13.1 % — SIGNIFICANT CHANGE UP (ref 10.3–14.5)
SODIUM SERPL-SCNC: 136 MMOL/L — SIGNIFICANT CHANGE UP (ref 135–145)
TROPONIN I SERPL-MCNC: <0.015 NG/ML — SIGNIFICANT CHANGE UP (ref 0–0.04)
WBC # BLD: 12.93 K/UL — HIGH (ref 3.8–10.5)
WBC # FLD AUTO: 12.93 K/UL — HIGH (ref 3.8–10.5)

## 2019-10-10 PROCEDURE — 93010 ELECTROCARDIOGRAM REPORT: CPT

## 2019-10-10 RX ORDER — MECLIZINE HCL 12.5 MG
50 TABLET ORAL ONCE
Refills: 0 | Status: COMPLETED | OUTPATIENT
Start: 2019-10-10 | End: 2019-10-10

## 2019-10-10 RX ADMIN — Medication 50 MILLIGRAM(S): at 23:06

## 2019-10-11 DIAGNOSIS — I10 ESSENTIAL (PRIMARY) HYPERTENSION: ICD-10-CM

## 2019-10-11 DIAGNOSIS — E03.9 HYPOTHYROIDISM, UNSPECIFIED: ICD-10-CM

## 2019-10-11 DIAGNOSIS — N40.0 BENIGN PROSTATIC HYPERPLASIA WITHOUT LOWER URINARY TRACT SYMPTOMS: ICD-10-CM

## 2019-10-11 DIAGNOSIS — D69.6 THROMBOCYTOPENIA, UNSPECIFIED: ICD-10-CM

## 2019-10-11 DIAGNOSIS — Z90.49 ACQUIRED ABSENCE OF OTHER SPECIFIED PARTS OF DIGESTIVE TRACT: Chronic | ICD-10-CM

## 2019-10-11 DIAGNOSIS — R42 DIZZINESS AND GIDDINESS: ICD-10-CM

## 2019-10-11 DIAGNOSIS — D72.829 ELEVATED WHITE BLOOD CELL COUNT, UNSPECIFIED: ICD-10-CM

## 2019-10-11 DIAGNOSIS — D64.9 ANEMIA, UNSPECIFIED: ICD-10-CM

## 2019-10-11 DIAGNOSIS — G47.00 INSOMNIA, UNSPECIFIED: ICD-10-CM

## 2019-10-11 DIAGNOSIS — Z98.890 OTHER SPECIFIED POSTPROCEDURAL STATES: Chronic | ICD-10-CM

## 2019-10-11 DIAGNOSIS — Z29.9 ENCOUNTER FOR PROPHYLACTIC MEASURES, UNSPECIFIED: ICD-10-CM

## 2019-10-11 DIAGNOSIS — N17.9 ACUTE KIDNEY FAILURE, UNSPECIFIED: ICD-10-CM

## 2019-10-11 LAB
24R-OH-CALCIDIOL SERPL-MCNC: 34.5 NG/ML — SIGNIFICANT CHANGE UP (ref 30–80)
ALBUMIN SERPL ELPH-MCNC: 2.7 G/DL — LOW (ref 3.5–5)
ALP SERPL-CCNC: 50 U/L — SIGNIFICANT CHANGE UP (ref 40–120)
ALT FLD-CCNC: 20 U/L DA — SIGNIFICANT CHANGE UP (ref 10–60)
ANION GAP SERPL CALC-SCNC: 4 MMOL/L — LOW (ref 5–17)
APPEARANCE UR: CLEAR — SIGNIFICANT CHANGE UP
AST SERPL-CCNC: 20 U/L — SIGNIFICANT CHANGE UP (ref 10–40)
BILIRUB SERPL-MCNC: 0.7 MG/DL — SIGNIFICANT CHANGE UP (ref 0.2–1.2)
BILIRUB UR-MCNC: NEGATIVE — SIGNIFICANT CHANGE UP
BUN SERPL-MCNC: 23 MG/DL — HIGH (ref 7–18)
CALCIUM SERPL-MCNC: 8.4 MG/DL — SIGNIFICANT CHANGE UP (ref 8.4–10.5)
CHLORIDE SERPL-SCNC: 106 MMOL/L — SIGNIFICANT CHANGE UP (ref 96–108)
CHLORIDE UR-SCNC: 54 MMOL/L — LOW (ref 55–125)
CK MB BLD-MCNC: 0.9 % — SIGNIFICANT CHANGE UP (ref 0–3.5)
CK MB CFR SERPL CALC: 1.2 NG/ML — SIGNIFICANT CHANGE UP (ref 0–3.6)
CK SERPL-CCNC: 136 U/L — SIGNIFICANT CHANGE UP (ref 35–232)
CO2 SERPL-SCNC: 29 MMOL/L — SIGNIFICANT CHANGE UP (ref 22–31)
COLOR SPEC: YELLOW — SIGNIFICANT CHANGE UP
CREAT ?TM UR-MCNC: 101 MG/DL — SIGNIFICANT CHANGE UP
CREAT SERPL-MCNC: 1.27 MG/DL — SIGNIFICANT CHANGE UP (ref 0.5–1.3)
DIFF PNL FLD: ABNORMAL
FERRITIN SERPL-MCNC: 1244 NG/ML — HIGH (ref 30–400)
FOLATE SERPL-MCNC: 11 NG/ML — SIGNIFICANT CHANGE UP
GLUCOSE BLDC GLUCOMTR-MCNC: 101 MG/DL — HIGH (ref 70–99)
GLUCOSE BLDC GLUCOMTR-MCNC: 94 MG/DL — SIGNIFICANT CHANGE UP (ref 70–99)
GLUCOSE BLDC GLUCOMTR-MCNC: 96 MG/DL — SIGNIFICANT CHANGE UP (ref 70–99)
GLUCOSE SERPL-MCNC: 97 MG/DL — SIGNIFICANT CHANGE UP (ref 70–99)
GLUCOSE UR QL: NEGATIVE — SIGNIFICANT CHANGE UP
HAPTOGLOB SERPL-MCNC: 184 MG/DL — SIGNIFICANT CHANGE UP (ref 34–200)
HAV IGM SER-ACNC: SIGNIFICANT CHANGE UP
HBA1C BLD-MCNC: 5.3 % — SIGNIFICANT CHANGE UP (ref 4–5.6)
HBV CORE IGM SER-ACNC: SIGNIFICANT CHANGE UP
HBV SURFACE AG SER-ACNC: SIGNIFICANT CHANGE UP
HCT VFR BLD CALC: 35.1 % — LOW (ref 39–50)
HCV AB S/CO SERPL IA: 0.12 S/CO — SIGNIFICANT CHANGE UP (ref 0–0.99)
HCV AB SERPL-IMP: SIGNIFICANT CHANGE UP
HGB BLD-MCNC: 11.4 G/DL — LOW (ref 13–17)
HIV 1+2 AB+HIV1 P24 AG SERPL QL IA: SIGNIFICANT CHANGE UP
IRON SATN MFR SERPL: 10 % — LOW (ref 20–55)
IRON SATN MFR SERPL: 15 UG/DL — LOW (ref 65–170)
KETONES UR-MCNC: NEGATIVE — SIGNIFICANT CHANGE UP
LDH SERPL L TO P-CCNC: 185 U/L — SIGNIFICANT CHANGE UP (ref 120–225)
LEUKOCYTE ESTERASE UR-ACNC: ABNORMAL
MAGNESIUM SERPL-MCNC: 2.4 MG/DL — SIGNIFICANT CHANGE UP (ref 1.6–2.6)
MCHC RBC-ENTMCNC: 32.5 GM/DL — SIGNIFICANT CHANGE UP (ref 32–36)
MCHC RBC-ENTMCNC: 32.9 PG — SIGNIFICANT CHANGE UP (ref 27–34)
MCV RBC AUTO: 101.2 FL — HIGH (ref 80–100)
NITRITE UR-MCNC: POSITIVE
NRBC # BLD: 0 /100 WBCS — SIGNIFICANT CHANGE UP (ref 0–0)
OSMOLALITY SERPL: 292 MOSMOL/KG — SIGNIFICANT CHANGE UP (ref 280–301)
OSMOLALITY UR: 578 MOS/KG — SIGNIFICANT CHANGE UP (ref 50–1200)
PH UR: 5 — SIGNIFICANT CHANGE UP (ref 5–8)
PHOSPHATE SERPL-MCNC: 2.6 MG/DL — SIGNIFICANT CHANGE UP (ref 2.5–4.5)
PLATELET # BLD AUTO: 125 K/UL — LOW (ref 150–400)
POTASSIUM SERPL-MCNC: 4.2 MMOL/L — SIGNIFICANT CHANGE UP (ref 3.5–5.3)
POTASSIUM SERPL-SCNC: 4.2 MMOL/L — SIGNIFICANT CHANGE UP (ref 3.5–5.3)
POTASSIUM UR-SCNC: 33 MMOL/L — SIGNIFICANT CHANGE UP (ref 25–125)
PREALB SERPL-MCNC: 10 MG/DL — LOW (ref 20–40)
PROT ?TM UR-MCNC: 72 MG/DL — HIGH (ref 0–12)
PROT SERPL-MCNC: 6.3 G/DL — SIGNIFICANT CHANGE UP (ref 6–8.3)
PROT UR-MCNC: 100
RBC # BLD: 3.47 M/UL — LOW (ref 4.2–5.8)
RBC # BLD: 3.47 M/UL — LOW (ref 4.2–5.8)
RBC # FLD: 13.2 % — SIGNIFICANT CHANGE UP (ref 10.3–14.5)
RETICS #: 38.9 K/UL — SIGNIFICANT CHANGE UP (ref 25–125)
RETICS/RBC NFR: 1.1 % — SIGNIFICANT CHANGE UP (ref 0.5–2.5)
SODIUM SERPL-SCNC: 139 MMOL/L — SIGNIFICANT CHANGE UP (ref 135–145)
SODIUM UR-SCNC: 43 MMOL/L — SIGNIFICANT CHANGE UP (ref 40–220)
SP GR SPEC: 1.01 — SIGNIFICANT CHANGE UP (ref 1.01–1.02)
TIBC SERPL-MCNC: 155 UG/DL — LOW (ref 250–450)
TRANSFERRIN SERPL-MCNC: 127 MG/DL — LOW (ref 200–360)
TROPONIN I SERPL-MCNC: <0.015 NG/ML — SIGNIFICANT CHANGE UP (ref 0–0.04)
TSH SERPL-MCNC: 1.4 UU/ML — SIGNIFICANT CHANGE UP (ref 0.34–4.82)
UIBC SERPL-MCNC: 140 UG/DL — SIGNIFICANT CHANGE UP (ref 110–370)
UROBILINOGEN FLD QL: NEGATIVE — SIGNIFICANT CHANGE UP
VIT B12 SERPL-MCNC: 354 PG/ML — SIGNIFICANT CHANGE UP (ref 232–1245)
WBC # BLD: 10.6 K/UL — HIGH (ref 3.8–10.5)
WBC # FLD AUTO: 10.6 K/UL — HIGH (ref 3.8–10.5)

## 2019-10-11 PROCEDURE — 76770 US EXAM ABDO BACK WALL COMP: CPT | Mod: 26

## 2019-10-11 PROCEDURE — 70496 CT ANGIOGRAPHY HEAD: CPT | Mod: 26

## 2019-10-11 PROCEDURE — 93880 EXTRACRANIAL BILAT STUDY: CPT | Mod: 26

## 2019-10-11 PROCEDURE — 71045 X-RAY EXAM CHEST 1 VIEW: CPT | Mod: 26

## 2019-10-11 PROCEDURE — 70450 CT HEAD/BRAIN W/O DYE: CPT | Mod: 26,59

## 2019-10-11 PROCEDURE — 99285 EMERGENCY DEPT VISIT HI MDM: CPT

## 2019-10-11 PROCEDURE — 99223 1ST HOSP IP/OBS HIGH 75: CPT

## 2019-10-11 PROCEDURE — 70551 MRI BRAIN STEM W/O DYE: CPT | Mod: 26

## 2019-10-11 PROCEDURE — 70498 CT ANGIOGRAPHY NECK: CPT | Mod: 26

## 2019-10-11 RX ORDER — DEXTROSE 50 % IN WATER 50 %
25 SYRINGE (ML) INTRAVENOUS ONCE
Refills: 0 | Status: DISCONTINUED | OUTPATIENT
Start: 2019-10-11 | End: 2019-10-11

## 2019-10-11 RX ORDER — DEXTROSE 50 % IN WATER 50 %
15 SYRINGE (ML) INTRAVENOUS ONCE
Refills: 0 | Status: DISCONTINUED | OUTPATIENT
Start: 2019-10-11 | End: 2019-10-11

## 2019-10-11 RX ORDER — CEFTRIAXONE 500 MG/1
INJECTION, POWDER, FOR SOLUTION INTRAMUSCULAR; INTRAVENOUS
Refills: 0 | Status: DISCONTINUED | OUTPATIENT
Start: 2019-10-11 | End: 2019-10-14

## 2019-10-11 RX ORDER — LIDOCAINE 4 G/100G
1 CREAM TOPICAL DAILY
Refills: 0 | Status: DISCONTINUED | OUTPATIENT
Start: 2019-10-11 | End: 2019-10-14

## 2019-10-11 RX ORDER — INFLUENZA VIRUS VACCINE 15; 15; 15; 15 UG/.5ML; UG/.5ML; UG/.5ML; UG/.5ML
0.5 SUSPENSION INTRAMUSCULAR ONCE
Refills: 0 | Status: DISCONTINUED | OUTPATIENT
Start: 2019-10-11 | End: 2019-10-14

## 2019-10-11 RX ORDER — ENOXAPARIN SODIUM 100 MG/ML
40 INJECTION SUBCUTANEOUS DAILY
Refills: 0 | Status: DISCONTINUED | OUTPATIENT
Start: 2019-10-11 | End: 2019-10-14

## 2019-10-11 RX ORDER — ZOLPIDEM TARTRATE 10 MG/1
5 TABLET ORAL AT BEDTIME
Refills: 0 | Status: DISCONTINUED | OUTPATIENT
Start: 2019-10-11 | End: 2019-10-11

## 2019-10-11 RX ORDER — INSULIN LISPRO 100/ML
VIAL (ML) SUBCUTANEOUS
Refills: 0 | Status: DISCONTINUED | OUTPATIENT
Start: 2019-10-11 | End: 2019-10-14

## 2019-10-11 RX ORDER — ATORVASTATIN CALCIUM 80 MG/1
40 TABLET, FILM COATED ORAL AT BEDTIME
Refills: 0 | Status: DISCONTINUED | OUTPATIENT
Start: 2019-10-11 | End: 2019-10-14

## 2019-10-11 RX ORDER — SODIUM CHLORIDE 9 MG/ML
1000 INJECTION INTRAMUSCULAR; INTRAVENOUS; SUBCUTANEOUS
Refills: 0 | Status: COMPLETED | OUTPATIENT
Start: 2019-10-11 | End: 2019-10-11

## 2019-10-11 RX ORDER — DEXTROSE 50 % IN WATER 50 %
12.5 SYRINGE (ML) INTRAVENOUS ONCE
Refills: 0 | Status: DISCONTINUED | OUTPATIENT
Start: 2019-10-11 | End: 2019-10-11

## 2019-10-11 RX ORDER — ZOLPIDEM TARTRATE 10 MG/1
5 TABLET ORAL AT BEDTIME
Refills: 0 | Status: DISCONTINUED | OUTPATIENT
Start: 2019-10-11 | End: 2019-10-14

## 2019-10-11 RX ORDER — DILTIAZEM HCL 120 MG
180 CAPSULE, EXT RELEASE 24 HR ORAL DAILY
Refills: 0 | Status: DISCONTINUED | OUTPATIENT
Start: 2019-10-11 | End: 2019-10-14

## 2019-10-11 RX ORDER — ZOLPIDEM TARTRATE 10 MG/1
5 TABLET ORAL AT BEDTIME
Refills: 0 | Status: DISCONTINUED | OUTPATIENT
Start: 2019-10-11 | End: 2019-10-12

## 2019-10-11 RX ORDER — SODIUM CHLORIDE 9 MG/ML
1000 INJECTION, SOLUTION INTRAVENOUS
Refills: 0 | Status: DISCONTINUED | OUTPATIENT
Start: 2019-10-11 | End: 2019-10-11

## 2019-10-11 RX ORDER — POTASSIUM CHLORIDE 20 MEQ
40 PACKET (EA) ORAL ONCE
Refills: 0 | Status: COMPLETED | OUTPATIENT
Start: 2019-10-11 | End: 2019-10-11

## 2019-10-11 RX ORDER — SODIUM CHLORIDE 9 MG/ML
1000 INJECTION INTRAMUSCULAR; INTRAVENOUS; SUBCUTANEOUS
Refills: 0 | Status: DISCONTINUED | OUTPATIENT
Start: 2019-10-11 | End: 2019-10-14

## 2019-10-11 RX ORDER — MECLIZINE HCL 12.5 MG
25 TABLET ORAL EVERY 8 HOURS
Refills: 0 | Status: DISCONTINUED | OUTPATIENT
Start: 2019-10-11 | End: 2019-10-14

## 2019-10-11 RX ORDER — ACETAMINOPHEN 500 MG
650 TABLET ORAL EVERY 6 HOURS
Refills: 0 | Status: DISCONTINUED | OUTPATIENT
Start: 2019-10-11 | End: 2019-10-11

## 2019-10-11 RX ORDER — CEFTRIAXONE 500 MG/1
1000 INJECTION, POWDER, FOR SOLUTION INTRAMUSCULAR; INTRAVENOUS EVERY 24 HOURS
Refills: 0 | Status: DISCONTINUED | OUTPATIENT
Start: 2019-10-12 | End: 2019-10-14

## 2019-10-11 RX ORDER — TAMSULOSIN HYDROCHLORIDE 0.4 MG/1
0.4 CAPSULE ORAL AT BEDTIME
Refills: 0 | Status: DISCONTINUED | OUTPATIENT
Start: 2019-10-11 | End: 2019-10-14

## 2019-10-11 RX ORDER — GLUCAGON INJECTION, SOLUTION 0.5 MG/.1ML
1 INJECTION, SOLUTION SUBCUTANEOUS ONCE
Refills: 0 | Status: DISCONTINUED | OUTPATIENT
Start: 2019-10-11 | End: 2019-10-14

## 2019-10-11 RX ORDER — LEVOTHYROXINE SODIUM 125 MCG
100 TABLET ORAL DAILY
Refills: 0 | Status: DISCONTINUED | OUTPATIENT
Start: 2019-10-11 | End: 2019-10-14

## 2019-10-11 RX ORDER — ACETAMINOPHEN 500 MG
650 TABLET ORAL EVERY 6 HOURS
Refills: 0 | Status: DISCONTINUED | OUTPATIENT
Start: 2019-10-11 | End: 2019-10-14

## 2019-10-11 RX ORDER — CEFTRIAXONE 500 MG/1
1000 INJECTION, POWDER, FOR SOLUTION INTRAMUSCULAR; INTRAVENOUS ONCE
Refills: 0 | Status: COMPLETED | OUTPATIENT
Start: 2019-10-11 | End: 2019-10-11

## 2019-10-11 RX ADMIN — Medication 650 MILLIGRAM(S): at 18:33

## 2019-10-11 RX ADMIN — Medication 25 MILLIGRAM(S): at 05:57

## 2019-10-11 RX ADMIN — LIDOCAINE 1 PATCH: 4 CREAM TOPICAL at 17:56

## 2019-10-11 RX ADMIN — SODIUM CHLORIDE 100 MILLILITER(S): 9 INJECTION INTRAMUSCULAR; INTRAVENOUS; SUBCUTANEOUS at 02:55

## 2019-10-11 RX ADMIN — Medication 40 MILLIEQUIVALENT(S): at 02:05

## 2019-10-11 RX ADMIN — ZOLPIDEM TARTRATE 5 MILLIGRAM(S): 10 TABLET ORAL at 23:30

## 2019-10-11 RX ADMIN — CEFTRIAXONE 100 MILLIGRAM(S): 500 INJECTION, POWDER, FOR SOLUTION INTRAMUSCULAR; INTRAVENOUS at 14:39

## 2019-10-11 RX ADMIN — ENOXAPARIN SODIUM 40 MILLIGRAM(S): 100 INJECTION SUBCUTANEOUS at 13:29

## 2019-10-11 RX ADMIN — ZOLPIDEM TARTRATE 5 MILLIGRAM(S): 10 TABLET ORAL at 02:05

## 2019-10-11 RX ADMIN — Medication 100 MICROGRAM(S): at 05:58

## 2019-10-11 RX ADMIN — LIDOCAINE 1 PATCH: 4 CREAM TOPICAL at 18:33

## 2019-10-11 RX ADMIN — SODIUM CHLORIDE 60 MILLILITER(S): 9 INJECTION INTRAMUSCULAR; INTRAVENOUS; SUBCUTANEOUS at 13:31

## 2019-10-11 RX ADMIN — Medication 180 MILLIGRAM(S): at 05:57

## 2019-10-11 RX ADMIN — TAMSULOSIN HYDROCHLORIDE 0.4 MILLIGRAM(S): 0.4 CAPSULE ORAL at 22:12

## 2019-10-11 RX ADMIN — Medication 650 MILLIGRAM(S): at 17:57

## 2019-10-11 NOTE — H&P ADULT - PROBLEM SELECTOR PLAN 6
- pt has hx of hypothyroidism   - Pt takes Synthroid at home 100 MCG  - c/w Synthroid 100 MCG  - f/u TSH

## 2019-10-11 NOTE — PATIENT PROFILE ADULT - ABILITY TO HEAR (WITH HEARING AID OR HEARING APPLIANCE IF NORMALLY USED):
Mildly to Moderately Impaired: difficulty hearing in some environments or speaker may need to increase volume or speak distinctly/Loss of hearing in the right ear

## 2019-10-11 NOTE — H&P ADULT - NSHPSOCIALHISTORY_GEN_ALL_CORE
Pt was ex smoker for 25 yrs, <1ppd and quit 35 yrs back. Pt denies  drinking alcohol and illicit drug use. 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

## 2019-10-11 NOTE — H&P ADULT - NSICDXPASTSURGICALHX_GEN_ALL_CORE_FT
PAST SURGICAL HISTORY:  H/O hernia repair     H/O surgical biopsy lungs bilaterally    Nasal deviation repaired    No significant past surgical history     S/P cholecystectomy

## 2019-10-11 NOTE — H&P ADULT - PROBLEM SELECTOR PLAN 2
Pt is symptomatic,  dysuria,  increased frequency of micturition and  no hematuria.  AFebrile,  Leucocytosis is present 12.93k  f/u UA   f/u CXR   Pt also has hyperglycemia of 230 and started on ISS.

## 2019-10-11 NOTE — H&P ADULT - NSHPREVIEWOFSYSTEMS_GEN_ALL_CORE
REVIEW OF SYSTEMS:    CONSTITUTIONAL: No weakness, fevers or chills  EYES/ENT: No visual changes;  No vertigo or throat pain   NECK: No pain or stiffness  RESPIRATORY: No cough, wheezing, hemoptysis; No shortness of breath  CARDIOVASCULAR: No chest pain or palpitations  GASTROINTESTINAL: No abdominal or epigastric pain. No nausea, vomiting, or hematemesis; No diarrhea or constipation. No melena or hematochezia.  GENITOURINARY: No dysuria, frequency or hematuria  NEUROLOGICAL: Feeling dizzy and unsteady gait  SKIN: No itching, burning, rashes, or lesions   All other review of systems is negative unless indicated above.

## 2019-10-11 NOTE — H&P ADULT - ATTENDING COMMENTS
Vital Signs Last 24 Hrs  T(C): 36.5 (10 Oct 2019 21:06), Max: 36.5 (10 Oct 2019 21:06)  T(F): 97.7 (10 Oct 2019 21:06), Max: 97.7 (10 Oct 2019 21:06)  HR: 91 (10 Oct 2019 21:06) (91 - 91)  BP: 145/71 (10 Oct 2019 21:06) (145/71 - 145/71)  BP(mean): --  RR: 18 (10 Oct 2019 21:06) (18 - 18)  SpO2: 96% (10 Oct 2019 21:06) (96% - 96%) Patient seen and examined ; case was discussed with the admitting resident    ROS: as in the HPI; all other ROS negative    SH and family history as above    Vital Signs Last 24 Hrs  T(C): 36.7 (11 Oct 2019 01:55), Max: 36.7 (11 Oct 2019 01:55)  T(F): 98 (11 Oct 2019 01:55), Max: 98 (11 Oct 2019 01:55)  HR: 74 (11 Oct 2019 01:55) (74 - 91)  BP: 132/61 (11 Oct 2019 01:55) (132/61 - 145/71)  BP(mean): --  RR: 18 (11 Oct 2019 01:55) (18 - 18)  SpO2: 98% (11 Oct 2019 01:55) (96% - 98%)    GEN: NAD  HEENT- normocephalic; mouth moist  CVS- S1S2+  LUNGS- clear to auscultation; no wheezing  ABD: Soft , nontender, nondistended, Bowel sounds are present  EXTREMITY: no calf tenderness, no cyanosis, no edema  NEURO: AAOx3; non focal neurologic exam; cranial nerves grossly intact  PSYCH: normal affect and behavior  BACK: no swelling or mass;   VASCULAR: ++ distal peripheral pulses  SKIN: warm and dry.       Labs Reviewed:                         11.2   12.93 )-----------( 126      ( 10 Oct 2019 23:03 )             34.2     10-10    136  |  103  |  25<H>  ----------------------------<  169<H>  3.6   |  27  |  1.45<H>    Ca    8.2<L>      10 Oct 2019 23:03    TPro  6.4  /  Alb  2.7<L>  /  TBili  0.6  /  DBili  x   /  AST  15  /  ALT  20  /  AlkPhos  52  10-10    CARDIAC MARKERS ( 10 Oct 2019 23:03 )  <0.015 ng/mL / x     / x     / x     / x            PT/INR - ( 10 Oct 2019 23:03 )   PT: 12.6 sec;   INR: 1.13 ratio         PTT - ( 10 Oct 2019 23:03 )  PTT:28.4 sec  BNP:   MEDICATIONS  (STANDING):  diltiazem    milliGRAM(s) Oral daily  levothyroxine 100 MICROGram(s) Oral daily  tamsulosin 0.4 milliGRAM(s) Oral at bedtime    MEDICATIONS  (PRN):  acetaminophen   Tablet .. 650 milliGRAM(s) Oral every 6 hours PRN Moderate Pain (4 - 6)  zolpidem 5 milliGRAM(s) Oral at bedtime PRN Insomnia  zolpidem 5 milliGRAM(s) Oral at bedtime PRN Insomnia      CXR reviewed  EKG Reviewed    85 y/o M with h/o HTN, BPH admitted with acute onset of vertigo.       Plan of care discussed with patient ;  all questions and concerns were addressed.  Discussed with Patient's family Patient seen and examined ; case was discussed with the admitting resident    ROS: as in the HPI; all other ROS negative    SH and family history as above    Vital Signs Last 24 Hrs  T(C): 36.7 (11 Oct 2019 01:55), Max: 36.7 (11 Oct 2019 01:55)  T(F): 98 (11 Oct 2019 01:55), Max: 98 (11 Oct 2019 01:55)  HR: 74 (11 Oct 2019 01:55) (74 - 91)  BP: 132/61 (11 Oct 2019 01:55) (132/61 - 145/71)  BP(mean): --  RR: 18 (11 Oct 2019 01:55) (18 - 18)  SpO2: 98% (11 Oct 2019 01:55) (96% - 98%)    GEN: NAD  HEENT- normocephalic; mouth moist , leftward gaze nystagmus   CVS- S1S2+  LUNGS- clear to auscultation; no wheezing  ABD: Soft , nontender, nondistended, Bowel sounds are present  EXTREMITY: no calf tenderness, no cyanosis, no edema  NEURO: AAOx3; non focal neurologic exam; cranial nerves grossly intact except mild L facial asymmetry, broad based ataxic gait, mild dysmetria on L   PSYCH: normal affect and behavior  BACK: no swelling or mass;   VASCULAR: ++ distal peripheral pulses  SKIN: warm and dry, scattered SKs over back       Labs Reviewed:                         11.2 12.93 )-----------( 126      ( 10 Oct 2019 23:03 )             34.2     10-10    136  |  103  |  25<H>  ----------------------------<  169<H>  3.6   |  27  |  1.45<H>    Ca    8.2<L>      10 Oct 2019 23:03    TPro  6.4  /  Alb  2.7<L>  /  TBili  0.6  /  DBili  x   /  AST  15  /  ALT  20  /  AlkPhos  52  10-10    CARDIAC MARKERS ( 10 Oct 2019 23:03 )  <0.015 ng/mL / x     / x     / x     / x            PT/INR - ( 10 Oct 2019 23:03 )   PT: 12.6 sec;   INR: 1.13 ratio         PTT - ( 10 Oct 2019 23:03 )  PTT:28.4 sec  BNP:   MEDICATIONS  (STANDING):  diltiazem    milliGRAM(s) Oral daily  levothyroxine 100 MICROGram(s) Oral daily  tamsulosin 0.4 milliGRAM(s) Oral at bedtime    MEDICATIONS  (PRN):  acetaminophen   Tablet .. 650 milliGRAM(s) Oral every 6 hours PRN Moderate Pain (4 - 6)  zolpidem 5 milliGRAM(s) Oral at bedtime PRN Insomnia  zolpidem 5 milliGRAM(s) Oral at bedtime PRN Insomnia      CXR reviewed  EKG Reviewed    85 y/o M with h/o HTN, BPH admitted with acute onset of vertigo. Patient denies lung surgery or h/o of cancer, son denies hx as well but we have from 2016 a ct with multiple masses and post op changes from lobectomy, had PE at that time. Hx is unclear, will get a CXR to evaluate.     1. Vertigo- possible central cause, possible hx of malignancy- CTH ok, MRI/MRA per neurology, deferred ct angio 2/2 TRACI, asa, statin, tele, echo, orthostatics, infectious w/u.    2. TRACI- check UA, known BPH, h/o nephrolithiasis, check renal us. Patient follows with Dr. Ayala outpatient.   3. Leukocytosis- has some urinary sx, UA pending, afebrile  4. Macrocytic anemia- check anemia panel, denies overt blood loss   5. DM   6. Thrombocytopenia- mild, check pbs, viral hepatitis   7. Insomnia- on Ambien at home     Plan of care discussed with patient ;  all questions and concerns were addressed.

## 2019-10-11 NOTE — H&P ADULT - HISTORY OF PRESENT ILLNESS
85 y/o M from home,  lives alone, ambulates with cane with  PMH PE, chronic back pain, BPH, HTN, Hypothyroid, Insomnia and PSH of cholecystectomy, Hernia repair presented to ED with  Dizziness since 3 days of room spinning sensation and difficulty ambulating  due to feeling off balance. Never had symptoms like this before. He endorses he is shaking, mouth is dry, felt dizzy when he was getting out of bed. He walks independently but recently he is requiring  assistance and is using cane. He states his urinary stream is slowing down, Dysuria, Nocturia, Increased urinary frequency. He follows up with Dr Ayala for his BPH. He recently travelled to [facility] for 2 months and returned on September 10. Pt denies sick contacts,  bleeding,  chest pain, back pains,  fall, trauma, LOC, fevers, chills, sweats, neck pains, sob, palpitations, N/v/D/Constipation, weakness, numbness, tingling.  GOC Full code

## 2019-10-11 NOTE — ED PROVIDER NOTE - CLINICAL SUMMARY MEDICAL DECISION MAKING FREE TEXT BOX
84 year old male with dizziness. vitals WNL. PE as above.  labs are unremarkable. ecg without acute ischemic changes. ct head unremarkable. states feels improvement with meclizine but pt still unsteady with ambulation. will admit for dizziness.

## 2019-10-11 NOTE — CHART NOTE - NSCHARTNOTEFT_GEN_A_CORE
Patient is a 84y old  Male who presents with a chief complaint of Dizziness (11 Oct 2019 11:01)    REVIEW OF SYSTEMS: denies fever, chills, SOB, palpitations, chest pain, abdominal pain, nausea, vomiting, diarrhea, constipation, dizziness    MEDICATIONS  (STANDING):  atorvastatin 40 milliGRAM(s) Oral at bedtime  cefTRIAXone   IVPB      diltiazem    milliGRAM(s) Oral daily  enoxaparin Injectable 40 milliGRAM(s) SubCutaneous daily  insulin lispro (HumaLOG) corrective regimen sliding scale   SubCutaneous Before meals and at bedtime  levothyroxine 100 MICROGram(s) Oral daily  lidocaine   Patch 1 Patch Transdermal daily  sodium chloride 0.9%. 1000 milliLiter(s) (100 mL/Hr) IV Continuous <Continuous>  sodium chloride 0.9%. 1000 milliLiter(s) (60 mL/Hr) IV Continuous <Continuous>  tamsulosin 0.4 milliGRAM(s) Oral at bedtime    MEDICATIONS  (PRN):  acetaminophen   Tablet .. 650 milliGRAM(s) Oral every 6 hours PRN Moderate Pain (4 - 6)  glucagon  Injectable 1 milliGRAM(s) IntraMuscular once PRN Glucose <70 milliGRAM(s)/deciLiter  meclizine 25 milliGRAM(s) Oral every 8 hours PRN Dizziness  zolpidem 5 milliGRAM(s) Oral at bedtime PRN Insomnia  zolpidem 5 milliGRAM(s) Oral at bedtime PRN Insomnia      PHYSICAL EXAM:  GENERAL: NAD, well-groomed, well-developed  NERVOUS SYSTEM:  Alert & Oriented X3, Good concentration; Motor Strength 5/5 B/L upper and lower extremities; gait was not tested   CHEST/LUNG: Clear to percussion bilaterally; No rales, rhonchi, wheezing, or rubs  HEART: Regular rate and rhythm; No murmurs, rubs, or gallops  ABDOMEN: Soft, Nontender, Nondistended; Bowel sounds present    LABS:                        11.4   10.60 )-----------( 125      ( 11 Oct 2019 06:16 )             35.1     10-11    139  |  106  |  23<H>  ----------------------------<  97  4.2   |  29  |  1.27    Ca    8.4      11 Oct 2019 06:16  Phos  2.6     10-11  Mg     2.4     10-11    TPro  6.3  /  Alb  2.7<L>  /  TBili  0.7  /  DBili  0.2  /  AST  20  /  ALT  20  /  AlkPhos  50  10-11    PT/INR - ( 10 Oct 2019 23:03 )   PT: 12.6 sec;   INR: 1.13 ratio         PTT - ( 10 Oct 2019 23:03 )  PTT:28.4 sec  Urinalysis Basic - ( 11 Oct 2019 14:16 )    Color: Yellow / Appearance: Clear / S.010 / pH: x  Gluc: x / Ketone: Negative  / Bili: Negative / Urobili: Negative   Blood: x / Protein: 100 / Nitrite: Positive   Leuk Esterase: Moderate / RBC: 5-10 /HPF / WBC >50 /HPF   Sq Epi: x / Non Sq Epi: Occasional /HPF / Bacteria: Many /HPF      Assessment and plan:  1. Dizziness: Possibly due to orthostatic hypotension from dehydration, in the setting of UTI  Can not rule out BPPV  CTA head and neck and MRI of brain Neg  Carotid doppler neg for stenosis  TTE noted: severe LA dilatation, normal EF  IVF  Ceftriaxone  Cont Meclizine PRN  PT eval, needs balance training    2. UTI  Cont Ceftriaxone   Please send Urine cx    3. TRACI due to dehydration   Improved with IVF  US renal and bladder noted, no hydronephrosis    4. Anemia and thrombocytopenia  Possibly due to Vitamin B12 deficiency ( level in low normal side)  Will obtain MMA level  TSH normal     5. BPH:   Cont Tamsulosin     6. HTN:  Hold Losartan and Diltiazem now  Restart when clinically appropriate    7. Prophylaxis:  Diet: Full  Lovenox for DVT prophylaxis  Fall precaution  PT eval  Disposition: pending PT eval, patient lives with wife and son at home

## 2019-10-11 NOTE — H&P ADULT - PROBLEM SELECTOR PLAN 1
p/w Dizziness and  inability to ambulate since 3 days  Nystagmus and Dysmetria is present  - CT head shows No acute intracranial hemorrhage, mass effect, or acute territorial infarction   - EKG showed NSR   - Fall precautions  -f/u  carotid doppler    CTA head and neck is not done due to TRACI  - Pt is allergic to  Aspirin 81mg (Ecotrin), and started on Statins (Lipitor) 40mg HS  -  c/w 24 hr Telemetry monitoring to r/o cardiac arrythmia  - c/w Frequent neurochecks q4  Trop x 1 negative, F/U T2 and T3  f/u Orthostatics q 12  - f/u Echocardiogram    f/u HbA1C, TSH , Vitamin B12 , Folate & Vitamin D  Lipid profile is not done as it was wnl on 10/1  - f/u PT evaluation due to gait instability   possible need for MRA of head and neck    Neurology consulted Dr. Gallego  Pt had prior abnormal CT scan showing Lobectomy but family is not aware of it. Please obtain records from the PMD.

## 2019-10-11 NOTE — H&P ADULT - PROBLEM SELECTOR PLAN 9
suspect pre renal due to  Hypovolemia due to increased frequency of micturition,   May be AIN as the pt takes NSAIDS  for his Back pain  Could be Post renal due to BPH   On admission Cr is 1.45   and Baseline creatinine is 1.10  f/u U/A   F/U Urine lytes  f/u renal ultrasound  Gentle IVF, reassess  Renally dose medications, avoid nephrotoxins like NSAIDS.

## 2019-10-11 NOTE — CHART NOTE - NSCHARTNOTEFT_GEN_A_CORE
Pt c/o burning on urination.  UA sent and positive   Urine culture sent as well- f/u result  afebrile, no leukocytosis  discussed with Dr. Phan -- ceftriaxone 1gm q 24hours with IVF started  PT pending for balance training.   Reviewed CT A of neck/ head and Renal/ bladder sono result   Brain MRI done, result pending   c/w orthostatic monitoring. Pt c/o burning on urination.  UA sent and positive   Urine culture sent as well- f/u result  afebrile, no leukocytosis  discussed with Dr. Phan -- ceftriaxone 1gm q 24hours with IVF started  PT pending for balance training.   Reviewed CT A of neck/ head and Renal/ bladder sono result   Brain MRI done - no acute reported. --> seen by Dr. Gallego, likely due to vasovagal, vertigo. c/w meclizine and monitor orthostatic monitoring.  discussed with Dr. Phan and d/toño tele monitoring

## 2019-10-11 NOTE — H&P ADULT - NSICDXPASTMEDICALHX_GEN_ALL_CORE_FT
PAST MEDICAL HISTORY:  BPH (benign prostatic hypertrophy)     Chronic back pain     Herniated disc     Histoplasmosis     Hypertension     Hypothyroid     Insomnia     Other pulmonary embolism without acute cor pulmonale, unspecified chronicity off Xarelto x 2 years    Renal cysts, acquired, bilateral     Varicose veins

## 2019-10-11 NOTE — CONSULT NOTE ADULT - SUBJECTIVE AND OBJECTIVE BOX
Patient is a 84y old  Male who presents with a chief complaint of Dizziness (11 Oct 2019 01:46)      HPI:  83 y/o M from home,  lives alone, ambulates with cane with  PMH PE, chronic back pain, BPH, HTN, Hypothyroid, Insomnia and PSH of cholecystectomy, Hernia repair presented to ED with  Dizziness since 3 days of room spinning sensation and difficulty ambulating  due to feeling off balance. Never had symptoms like this before. He endorses he is shaking, mouth is dry, felt dizzy when he was getting out of bed. He walks independently but recently he is requiring  assistance and is using cane. He states his urinary stream is slowing down, Dysuria, Nocturia, Increased urinary frequency. He follows up with Dr Ayala for his BPH. He recently travelled to [facility] for 2 months and returned on September 10. Pt denies sick contacts,  bleeding,  chest pain, back pains,  fall, trauma, LOC, fevers, chills, sweats, neck pains, sob, palpitations, N/v/D/Constipation, weakness, numbness, tingling.  [facility] Full code (11 Oct 2019 01:46)         Neurological Review of Systems:  No difficulty with language.  No vision loss or double vision.  No dizziness, vertigo or new hearing loss.  No difficulty with speech or swallowing.  No focal weakness.  No focal sensory changes.  No numbness or tingling in the bilateral lower extremities.  No difficulty with balance.  No difficulty with ambulation.        MEDICATIONS  (STANDING):  atorvastatin 40 milliGRAM(s) Oral at bedtime  diltiazem    milliGRAM(s) Oral daily  enoxaparin Injectable 40 milliGRAM(s) SubCutaneous daily  insulin lispro (HumaLOG) corrective regimen sliding scale   SubCutaneous Before meals and at bedtime  levothyroxine 100 MICROGram(s) Oral daily  sodium chloride 0.9%. 1000 milliLiter(s) (100 mL/Hr) IV Continuous <Continuous>  sodium chloride 0.9%. 1000 milliLiter(s) (60 mL/Hr) IV Continuous <Continuous>  tamsulosin 0.4 milliGRAM(s) Oral at bedtime    MEDICATIONS  (PRN):  acetaminophen   Tablet .. 650 milliGRAM(s) Oral every 6 hours PRN Moderate Pain (4 - 6)  glucagon  Injectable 1 milliGRAM(s) IntraMuscular once PRN Glucose <70 milliGRAM(s)/deciLiter  meclizine 25 milliGRAM(s) Oral every 8 hours PRN Dizziness  zolpidem 5 milliGRAM(s) Oral at bedtime PRN Insomnia  zolpidem 5 milliGRAM(s) Oral at bedtime PRN Insomnia    Allergies    No Known Allergies    Intolerances      PAST MEDICAL & SURGICAL HISTORY:  Varicose veins  Other pulmonary embolism without acute cor pulmonale, unspecified chronicity: off Xarelto x 2 years  Chronic back pain  Herniated disc  Histoplasmosis  BPH (benign prostatic hypertrophy)  Renal cysts, acquired, bilateral  Insomnia  Hypothyroid  Hypertension  H/O hernia repair  S/P cholecystectomy  Nasal deviation: repaired  H/O surgical biopsy: lungs bilaterally  No significant past surgical history    FAMILY HISTORY:  FH: type 2 diabetes: father    SOCIAL HISTORY: non smoker/ former smoker/ active smoker    Review of Systems:  Constitutional: No generalized weakness. No fevers or chills.                    Eyes, Ears, Mouth, Throat: No vision loss   Respiratory: No shortness of breath or cough.                                Cardiovascular: No chest pain or palpitations  Gastrointestinal: No nausea or vomiting.                                         Genitourinary: No urinary incontinence or burning on urination.  Musculoskeletal: No joint pain.                                                           Dermatologic: No rash.  Neurological: as per HPI                                                                      Psychiatric: No behavioral problems.  Endocrine: No known hypoglycemia.               Hematologic/Lymphatic: No easy bleeding.    O:  Vital Signs Last 24 Hrs  T(C): 37.2 (11 Oct 2019 07:21), Max: 37.2 (11 Oct 2019 07:21)  T(F): 98.9 (11 Oct 2019 07:21), Max: 98.9 (11 Oct 2019 07:21)  HR: 77 (11 Oct 2019 07:21) (74 - 91)  BP: 112/43 (11 Oct 2019 07:21) (112/43 - 145/71)  BP(mean): --  RR: 18 (11 Oct 2019 07:21) (16 - 18)  SpO2: 95% (11 Oct 2019 07:21) (95% - 98%)    General Exam:   General appearance: No acute distress                 Cardiovascular: Pedal dorsalis pulses intact bilaterally    Mental Status: Orientated to self, date and place.  Attention intact.  No dysarthria, aphasia or neglect.  Knowledge intact.  Registration intact.  Short and long term memory grossly intact.      Cranial Nerves: CN I - not tested.  PERRL, EOMI, VFF, no nystagmus or diplopia.  No APD.  Fundi not visualized.  CN V1-3 intact to light touch and pinprick.  No facial asymmetry.  Hearing intact to finger rub bilaterally.  Tongue, uvula and palate midline.  Sternocleidomastoid and Trapezius intact bilaterally.    Motor:   Tone: normal.                  Strength intact throughout  No pronator drift bilaterally                      No dysmetria on finger-nose-finger or heel-shin-heel  No truncal ataxia.  No resting, postural or action tremor.  No myoclonus.    Sensation: intact to light touch, pinprick, vibration and proprioception    Deep Tendon Reflexes: 1+ bilateral biceps, triceps, brachioradialis, knee and ankle  Toes flexor bilaterally    Gait: normal and stable.  Rhomberg -emily.    Other:     LABS:                        11.4   10.60 )-----------( 125      ( 11 Oct 2019 06:16 )             35.1     10-11    139  |  106  |  23<H>  ----------------------------<  97  4.2   |  29  |  1.27    Ca    8.4      11 Oct 2019 06:16  Phos  2.6     10-11  Mg     2.4     10-11    TPro  6.3  /  Alb  2.7<L>  /  TBili  0.7  /  DBili  0.2  /  AST  20  /  ALT  20  /  AlkPhos  50  10-11    PT/INR - ( 10 Oct 2019 23:03 )   PT: 12.6 sec;   INR: 1.13 ratio         PTT - ( 10 Oct 2019 23:03 )  PTT:28.4 sec        RADIOLOGY & ADDITIONAL STUDIES:    EKG:  tele:  TTE:  EEG: Patient is a 84y old  Male who presents with a chief complaint of Dizziness (11 Oct 2019 01:46)      HPI:  85 y/o M from home,  lives alone, ambulates with cane with  PMH PE, chronic back pain, BPH, HTN, Hypothyroid, Insomnia and PSH of cholecystectomy, Hernia repair presented to ED with  Dizziness since 3 days of room spinning sensation and difficulty ambulating  due to feeling off balance. Never had symptoms like this before. He endorses he is shaking, mouth is dry, felt dizzy when he was getting out of bed. He walks independently but recently he is requiring  assistance and is using cane. He states his urinary stream is slowing down, Dysuria, Nocturia, Increased urinary frequency. He follows up with Dr Ayala for his BPH. He recently travelled to [facility] for 2 months and returned on September 10. Pt denies sick contacts,  bleeding,  chest pain, back pains,  fall, trauma, LOC, fevers, chills, sweats, neck pains, sob, palpitations, N/v/D/Constipation, weakness, numbness, tingling.  [facility] Full code (11 Oct 2019 01:46)    Pt reports dizziness, described as room spinning while lying flat and walking, x 3d.  Pt denies HA, ringing in ears or other sxms.      Neurological Review of Systems:  No difficulty with language.  No vision loss or double vision.  No dizziness, vertigo or new hearing loss.  No difficulty with speech or swallowing.  No focal weakness.  No focal sensory changes.  No numbness or tingling in the bilateral lower extremities.  No difficulty with balance.  No difficulty with ambulation.        MEDICATIONS  (STANDING):  atorvastatin 40 milliGRAM(s) Oral at bedtime  diltiazem    milliGRAM(s) Oral daily  enoxaparin Injectable 40 milliGRAM(s) SubCutaneous daily  insulin lispro (HumaLOG) corrective regimen sliding scale   SubCutaneous Before meals and at bedtime  levothyroxine 100 MICROGram(s) Oral daily  sodium chloride 0.9%. 1000 milliLiter(s) (100 mL/Hr) IV Continuous <Continuous>  sodium chloride 0.9%. 1000 milliLiter(s) (60 mL/Hr) IV Continuous <Continuous>  tamsulosin 0.4 milliGRAM(s) Oral at bedtime    MEDICATIONS  (PRN):  acetaminophen   Tablet .. 650 milliGRAM(s) Oral every 6 hours PRN Moderate Pain (4 - 6)  glucagon  Injectable 1 milliGRAM(s) IntraMuscular once PRN Glucose <70 milliGRAM(s)/deciLiter  meclizine 25 milliGRAM(s) Oral every 8 hours PRN Dizziness  zolpidem 5 milliGRAM(s) Oral at bedtime PRN Insomnia  zolpidem 5 milliGRAM(s) Oral at bedtime PRN Insomnia    Allergies    No Known Allergies    Intolerances      PAST MEDICAL & SURGICAL HISTORY:  Varicose veins  Other pulmonary embolism without acute cor pulmonale, unspecified chronicity: off Xarelto x 2 years  Chronic back pain  Herniated disc  Histoplasmosis  BPH (benign prostatic hypertrophy)  Renal cysts, acquired, bilateral  Insomnia  Hypothyroid  Hypertension  H/O hernia repair  S/P cholecystectomy  Nasal deviation: repaired  H/O surgical biopsy: lungs bilaterally  No significant past surgical history    FAMILY HISTORY:  FH: type 2 diabetes: father    SOCIAL HISTORY: former smoker     Review of Systems:  Constitutional: No generalized weakness. No fevers or chills                   Eyes, Ears, Mouth, Throat: No vision loss   Respiratory: No shortness of breath or cough                             Cardiovascular: No chest pain or palpitations  Gastrointestinal: No nausea or vomiting                                       Genitourinary: No urinary incontinence or burning on urination  Musculoskeletal: No joint pain                                                        Dermatologic: No rash  Neurological: as per HPI                                                                      Psychiatric: No behavioral problems  Endocrine: No known hypoglycemia             Hematologic/Lymphatic: No easy bleeding    O:  Vital Signs Last 24 Hrs  T(C): 37.2 (11 Oct 2019 07:21), Max: 37.2 (11 Oct 2019 07:21)  T(F): 98.9 (11 Oct 2019 07:21), Max: 98.9 (11 Oct 2019 07:21)  HR: 77 (11 Oct 2019 07:21) (74 - 91)  BP: 112/43 (11 Oct 2019 07:21) (112/43 - 145/71)  RR: 18 (11 Oct 2019 07:21) (16 - 18)  SpO2: 95% (11 Oct 2019 07:21) (95% - 98%)    General Exam:   General appearance: No acute distress                 Cardiovascular: Pedal dorsalis pulses intact bilaterally    Mental Status: Orientated to self, date and place.  Attention intact.  No dysarthria, aphasia or neglect.  Knowledge intact.  Registration intact.  Short and long term memory grossly intact.      Cranial Nerves: CN I - not tested.  PERRL, (+) horizontal and rotational nystagmus.  No diplopia.  No APD.  Fundi not visualized.  CN V1-3 intact to light touch.  ? acial asymmetry.  Hearing intact to finger rub bilaterally.  Tongue, uvula and palate midline.  Sternocleidomastoid and Trapezius intact bilaterally.    Motor:   Tone: Normal                  Strength intact throughout x 4 ext  No pronator drift bilaterally                      (+) Dysmetria on heel-shin-heel.  No dysmetria on finger-nose-finger    Sensation: intact to light touch.    Deep Tendon Reflexes: 1+ bilateral biceps, triceps, brachioradialis, knee and ankle  Toes flexor bilaterally    Gait: normal and stable.  Rhomberg -emily.    Other:     LABS:                        11.4   10.60 )-----------( 125      ( 11 Oct 2019 06:16 )             35.1     10-11    139  |  106  |  23<H>  ----------------------------<  97  4.2   |  29  |  1.27    Ca    8.4      11 Oct 2019 06:16  Phos  2.6     10-11  Mg     2.4     10-11    TPro  6.3  /  Alb  2.7<L>  /  TBili  0.7  /  DBili  0.2  /  AST  20  /  ALT  20  /  AlkPhos  50  10-11    PT/INR - ( 10 Oct 2019 23:03 )   PT: 12.6 sec;   INR: 1.13 ratio         PTT - ( 10 Oct 2019 23:03 )  PTT:28.4 sec        RADIOLOGY & ADDITIONAL STUDIES:    EKG:  tele:  TTE:  EEG: Patient is a 84y old  Male who presents with a chief complaint of Dizziness (11 Oct 2019 01:46)      HPI:  85 y/o M from home,  lives alone, ambulates with cane with  PMH PE, chronic back pain, BPH, HTN, Hypothyroid, Insomnia and PSH of cholecystectomy, Hernia repair presented to ED with  Dizziness since 3 days of room spinning sensation and difficulty ambulating  due to feeling off balance. Never had symptoms like this before. He endorses he is shaking, mouth is dry, felt dizzy when he was getting out of bed. He walks independently but recently he is requiring  assistance and is using cane. He states his urinary stream is slowing down, Dysuria, Nocturia, Increased urinary frequency. He follows up with Dr Ayala for his BPH. He recently travelled to [facility] for 2 months and returned on September 10. Pt denies sick contacts,  bleeding,  chest pain, back pains,  fall, trauma, LOC, fevers, chills, sweats, neck pains, sob, palpitations, N/v/D/Constipation, weakness, numbness, tingling.  [facility] Full code (11 Oct 2019 01:46)    Pt reports dizziness, described as room spinning while lying flat and walking, x 3d.  Pt denies HA, ringing in ears or other sxms.      Neurological Review of Systems:  No difficulty with language.  No vision loss or double vision.  (+) Dizziness.  (+) Vertigo.  No new hearing loss.  No difficulty with speech or swallowing.  No focal weakness.  No focal sensory changes.  No numbness or tingling in the bilateral lower extremities.  (+) Difficulty with balance.  (+) Difficulty with ambulation.        MEDICATIONS  (STANDING):  atorvastatin 40 milliGRAM(s) Oral at bedtime  diltiazem    milliGRAM(s) Oral daily  enoxaparin Injectable 40 milliGRAM(s) SubCutaneous daily  insulin lispro (HumaLOG) corrective regimen sliding scale   SubCutaneous Before meals and at bedtime  levothyroxine 100 MICROGram(s) Oral daily  sodium chloride 0.9%. 1000 milliLiter(s) (100 mL/Hr) IV Continuous <Continuous>  sodium chloride 0.9%. 1000 milliLiter(s) (60 mL/Hr) IV Continuous <Continuous>  tamsulosin 0.4 milliGRAM(s) Oral at bedtime    MEDICATIONS  (PRN):  acetaminophen   Tablet .. 650 milliGRAM(s) Oral every 6 hours PRN Moderate Pain (4 - 6)  glucagon  Injectable 1 milliGRAM(s) IntraMuscular once PRN Glucose <70 milliGRAM(s)/deciLiter  meclizine 25 milliGRAM(s) Oral every 8 hours PRN Dizziness  zolpidem 5 milliGRAM(s) Oral at bedtime PRN Insomnia  zolpidem 5 milliGRAM(s) Oral at bedtime PRN Insomnia    Allergies    No Known Allergies    Intolerances      PAST MEDICAL & SURGICAL HISTORY:  Varicose veins  Other pulmonary embolism without acute cor pulmonale, unspecified chronicity: off Xarelto x 2 years  Chronic back pain  Herniated disc  Histoplasmosis  BPH (benign prostatic hypertrophy)  Renal cysts, acquired, bilateral  Insomnia  Hypothyroid  Hypertension  H/O hernia repair  S/P cholecystectomy  Nasal deviation: repaired  H/O surgical biopsy: lungs bilaterally  No significant past surgical history    FAMILY HISTORY:  FH: type 2 diabetes: father    SOCIAL HISTORY: former smoker     Review of Systems:  Constitutional: No generalized weakness. No fevers or chills                   Eyes, Ears, Mouth, Throat: No vision loss   Respiratory: No shortness of breath or cough                             Cardiovascular: No chest pain or palpitations  Gastrointestinal: No nausea or vomiting                                       Genitourinary: No urinary incontinence or burning on urination  Musculoskeletal: No joint pain                                                        Dermatologic: No rash  Neurological: as per HPI                                                                      Psychiatric: No behavioral problems  Endocrine: No known hypoglycemia             Hematologic/Lymphatic: No easy bleeding    O:  Vital Signs Last 24 Hrs  T(C): 37.2 (11 Oct 2019 07:21), Max: 37.2 (11 Oct 2019 07:21)  T(F): 98.9 (11 Oct 2019 07:21), Max: 98.9 (11 Oct 2019 07:21)  HR: 77 (11 Oct 2019 07:21) (74 - 91)  BP: 112/43 (11 Oct 2019 07:21) (112/43 - 145/71)  RR: 18 (11 Oct 2019 07:21) (16 - 18)  SpO2: 95% (11 Oct 2019 07:21) (95% - 98%)    General Exam:   General appearance: No acute distress                 Cardiovascular: Pedal dorsalis pulses intact bilaterally    Mental Status: Orientated to self, date and place.  Attention intact.  No dysarthria, aphasia or neglect.  Knowledge intact.  Registration intact.  Short and long term memory grossly intact.      Cranial Nerves: CN I - not tested.  PERRL, EOMI, VFF, (+) horizontal and rotational nystagmus.  No diplopia.  No APD.  Fundi not visualized.  CN V1-3 intact to light touch.  No facial asymmetry.  Hearing intact to finger rub bilaterally.  Tongue, uvula and palate midline.  Sternocleidomastoid and Trapezius intact bilaterally.    Motor:   Tone: Normal                  Strength intact throughout x 4 ext  No pronator drift bilaterally                      (+) Dysmetria on finger-nose-finger & heel-to-shin      Sensation: intact to light touch.    Deep Tendon Reflexes: 2+ bilateral biceps, triceps, brachioradialis, knee and ankle  Toes flexor bilaterally    Gait: Unsteady.  Rhomberg negative    Other:   NIHSS=2 (two limb ataxia)  MRS=3    LABS:                        11.4   10.60 )-----------( 125      ( 11 Oct 2019 06:16 )             35.1     10-11    139  |  106  |  23<H>  ----------------------------<  97  4.2   |  29  |  1.27    Ca    8.4      11 Oct 2019 06:16  Phos  2.6     10-11  Mg     2.4     10-11    TPro  6.3  /  Alb  2.7<L>  /  TBili  0.7  /  DBili  0.2  /  AST  20  /  ALT  20  /  AlkPhos  50  10-11    PT/INR - ( 10 Oct 2019 23:03 )   PT: 12.6 sec;   INR: 1.13 ratio         PTT - ( 10 Oct 2019 23:03 )  PTT:28.4 sec        RADIOLOGY & ADDITIONAL STUDIES:  < from: 12 Lead ECG (10.10.19 @ 21:18) >  Ventricular Rate 81 BPM    Atrial Rate 81 BPM    P-R Interval 174 ms    QRS Duration 106 ms    Q-T Interval 384 ms    QTC Calculation(Bezet) 446 ms    P Axis 56 degrees    R Axis 7 degrees    T Axis 49 degrees    Diagnosis Line Normal sinus rhythm  Nonspecific T wave abnormality  Abnormal ECG    Confirmed by JOHNATHAN GUERRA ([id number]) on 10/11/2019 12:33:37 PM    < end of copied text >  < from: CT Head No Cont (10.11.19 @ 00:46) >  EXAM:  CT BRAIN                            PROCEDURE DATE:  10/11/2019          INTERPRETATION:  CLINICAL INFORMATION: Vertigo. Dizziness.    TECHNIQUE: Multiple axial sections were acquired from the base of the   skull to the vertex without contrast enhancement. Coronal and sagittal   reformats were additionally performed. Beam hardening artifact slightly   obscures evaluation of the posterior fossa and brainstem.    COMPARISON: No similar prior studies are available for comparison.    FINDINGS:  Parenchymal volume loss is noted with prominent ventricles and sulci. No   acute territorial infarct is demonstrated.    There is no evidence of an acute hemorrhage or mass-effect in the   posterior fossa or in the supratentorial region.     Evaluation of the osseous structures with the appropriate window appears   unremarkable. Vascular calcifications are noted. The right maxillary   sinus is small in size with opacification and osseous hyperostosis likely   due to chronic sinus disease. Mildmucosal thickening is seen in   bilateral ethmoid sinuses. The remaining visualized paranasal sinuses and   mastoid air cells are clear. Sequela of bilateral lens surgery is seen.    IMPRESSION:   No acute territorial infarct, hemorrhage or mass effect.              < end of copied text >    < from: CT Angio Head w/ IV Cont (10.11.19 @ 12:44) >  EXAM:  CT ANGIO NECK (W)AW IC                          EXAM:  CT ANGIO BRAIN (W)AW IC                            PROCEDURE DATE:  10/11/2019          INTERPRETATION:  CT angiogram head and neck with contrast    History dizziness x4-5 days    Contrast Omnipaque 90 cc; 10 cc discarded    Multiplanar MIPS included    Precontrast CT of the brain is negative for hemorrhage, cortical edema,   mass effect or hydrocephalus. Examination of the cervical circulation is   negative for carotid or vertebral artery dissection or occlusion or   significant stenosis. The vertebral arteries are codominant.    Examination of the intracranial circulation is negative for occlusion,   aneurysm or significant stenosis. There is normal venous sinus   enhancement.    IMPRESSION:    Normal CTA                DEMARCO CASTAÑEDA M.D., ATTENDING RADIOLOGIST  This document has been electronically signed. Oct 11 2019  1:11PM        < end of copied text >  < from: MR Head No Cont (10.11.19 @ 13:33) >  EXAM:  MR BRAIN                            PROCEDURE DATE:  10/11/2019          INTERPRETATION:  MRI brain without contrast    History dizziness    Comparison CT performed earlier the same day    There is no cortical edema, mass effect or hydrocephalus. There is trace   age typical scattered chronic microvascular ischemic change in the   subcortical white matter. There is no evidence of acute infarct or   previous parenchymal hemorrhage. The orbital and sellar contents and   cerebellar tonsils are within normal limits.    IMPRESSION:    Normal brain                DEMARCO CASTAÑEDA M.D., ATTENDING RADIOLOGIST  This document has been electronically signed. Oct 11 2019  2:14PM        < end of copied text > Patient is a 84y old  Male who presents with a chief complaint of Dizziness (11 Oct 2019 01:46)    HPI:  83 y/o M from home,  lives alone, ambulates with cane with  PMH PE, chronic back pain, BPH, HTN, Hypothyroid, Insomnia and PSH of cholecystectomy, Hernia repair presented to ED with  Dizziness since 3 days of room spinning sensation and difficulty ambulating  due to feeling off balance. Never had symptoms like this before. He endorses he is shaking, mouth is dry, felt dizzy when he was getting out of bed. He walks independently but recently he is requiring  assistance and is using cane. He states his urinary stream is slowing down, Dysuria, Nocturia, Increased urinary frequency. He follows up with Dr Ayala for his BPH. He recently travelled to [facility]"" for 2 months and returned on September 10. Pt denies sick contacts,  bleeding,  chest pain, back pains,  fall, trauma, LOC, fevers, chills, sweats, neck pains, sob, palpitations, N/v/D/Constipation, weakness, numbness, tingling.  [facility] Full code (11 Oct 2019 01:46)    Pt reports dizziness, described as room spinning while lying flat and walking, x 3d.  Pt denies HA, ringing in ears or other sxms.      Neurological Review of Systems:  No difficulty with language.  No vision loss or double vision.  (+) Dizziness.  (+) Vertigo.  No new hearing loss.  No difficulty with speech or swallowing.  No focal weakness.  No focal sensory changes.  No numbness or tingling in the bilateral lower extremities.  (+) Difficulty with balance.  (+) Difficulty with ambulation.        MEDICATIONS  (STANDING):  atorvastatin 40 milliGRAM(s) Oral at bedtime  diltiazem    milliGRAM(s) Oral daily  enoxaparin Injectable 40 milliGRAM(s) SubCutaneous daily  insulin lispro (HumaLOG) corrective regimen sliding scale   SubCutaneous Before meals and at bedtime  levothyroxine 100 MICROGram(s) Oral daily  sodium chloride 0.9%. 1000 milliLiter(s) (100 mL/Hr) IV Continuous <Continuous>  sodium chloride 0.9%. 1000 milliLiter(s) (60 mL/Hr) IV Continuous <Continuous>  tamsulosin 0.4 milliGRAM(s) Oral at bedtime    MEDICATIONS  (PRN):  acetaminophen   Tablet .. 650 milliGRAM(s) Oral every 6 hours PRN Moderate Pain (4 - 6)  glucagon  Injectable 1 milliGRAM(s) IntraMuscular once PRN Glucose <70 milliGRAM(s)/deciLiter  meclizine 25 milliGRAM(s) Oral every 8 hours PRN Dizziness  zolpidem 5 milliGRAM(s) Oral at bedtime PRN Insomnia  zolpidem 5 milliGRAM(s) Oral at bedtime PRN Insomnia    Allergies  No Known Allergies    PAST MEDICAL & SURGICAL HISTORY:  Varicose veins  Other pulmonary embolism without acute cor pulmonale, unspecified chronicity: off Xarelto x 2 years  Chronic back pain  Herniated disc  Histoplasmosis  BPH (benign prostatic hypertrophy)  Renal cysts, acquired, bilateral  Insomnia  Hypothyroid  Hypertension  H/O hernia repair  S/P cholecystectomy  Nasal deviation: repaired  H/O surgical biopsy: lungs bilaterally  No significant past surgical history    FAMILY HISTORY:  FH: type 2 diabetes: father    SOCIAL HISTORY: former smoker     Review of Systems:  Constitutional: No generalized weakness. No fevers or chills                   Eyes, Ears, Mouth, Throat: No vision loss   Respiratory: No shortness of breath or cough                             Cardiovascular: No chest pain or palpitations  Gastrointestinal: No nausea or vomiting                                       Genitourinary: No urinary incontinence or burning on urination  Musculoskeletal: No joint pain                                                        Dermatologic: No rash  Neurological: as per HPI                                                                      Psychiatric: No behavioral problems  Endocrine: No known hypoglycemia             Hematologic/Lymphatic: No easy bleeding    O:  Vital Signs Last 24 Hrs  T(C): 37.2 (11 Oct 2019 07:21), Max: 37.2 (11 Oct 2019 07:21)  T(F): 98.9 (11 Oct 2019 07:21), Max: 98.9 (11 Oct 2019 07:21)  HR: 77 (11 Oct 2019 07:21) (74 - 91)  BP: 112/43 (11 Oct 2019 07:21) (112/43 - 145/71)  RR: 18 (11 Oct 2019 07:21) (16 - 18)  SpO2: 95% (11 Oct 2019 07:21) (95% - 98%)    General Exam:   General appearance: No acute distress                 Cardiovascular: Pedal dorsalis pulses intact bilaterally    Mental Status: Orientated to self, date and place.  Attention intact.  No dysarthria, aphasia or neglect.  Knowledge intact.  Registration intact.  Short and long term memory grossly intact.      Cranial Nerves: CN I - not tested.  PERRL, EOMI, VFF, (+) horizontal and rotational nystagmus.  No diplopia.  No APD.  Fundi not visualized.  CN V1-3 intact to light touch.  No facial asymmetry.  Hearing intact to finger rub bilaterally.  Tongue, uvula and palate midline.  Sternocleidomastoid and Trapezius intact bilaterally.    Motor:   Tone: Normal                  Strength intact throughout x 4 ext  No pronator drift bilaterally                      (+) Dysmetria on finger-nose-finger & heel-to-shin      Sensation: intact to light touch.    Deep Tendon Reflexes: 2+ bilateral biceps, triceps, brachioradialis, knee and ankle  Toes flexor bilaterally    Gait: Unsteady.  Rhomberg negative    Other:   NIHSS=2 (two limb ataxia)  MRS=3    LABS:                        11.4   10.60 )-----------( 125      ( 11 Oct 2019 06:16 )             35.1     10-11    139  |  106  |  23<H>  ----------------------------<  97  4.2   |  29  |  1.27    Ca    8.4      11 Oct 2019 06:16  Phos  2.6     10-11  Mg     2.4     10-11    TPro  6.3  /  Alb  2.7<L>  /  TBili  0.7  /  DBili  0.2  /  AST  20  /  ALT  20  /  AlkPhos  50  10-11    PT/INR - ( 10 Oct 2019 23:03 )   PT: 12.6 sec;   INR: 1.13 ratio    PTT - ( 10 Oct 2019 23:03 )  PTT:28.4 sec        RADIOLOGY & ADDITIONAL STUDIES:  < from: 12 Lead ECG (10.10.19 @ 21:18) >  Ventricular Rate 81 BPM    Atrial Rate 81 BPM    P-R Interval 174 ms    QRS Duration 106 ms    Q-T Interval 384 ms    QTC Calculation(Bezet) 446 ms    P Axis 56 degrees    R Axis 7 degrees    T Axis 49 degrees    Diagnosis Line Normal sinus rhythm  Nonspecific T wave abnormality  Abnormal ECG    Confirmed by JOHNATHAN GUERRA ([id number]) on 10/11/2019 12:33:37 PM    < end of copied text >  < from: CT Head No Cont (10.11.19 @ 00:46) >  EXAM:  CT BRAIN                            PROCEDURE DATE:  10/11/2019          INTERPRETATION:  CLINICAL INFORMATION: Vertigo. Dizziness.    TECHNIQUE: Multiple axial sections were acquired from the base of the   skull to the vertex without contrast enhancement. Coronal and sagittal   reformats were additionally performed. Beam hardening artifact slightly   obscures evaluation of the posterior fossa and brainstem.    COMPARISON: No similar prior studies are available for comparison.    FINDINGS:  Parenchymal volume loss is noted with prominent ventricles and sulci. No   acute territorial infarct is demonstrated.    There is no evidence of an acute hemorrhage or mass-effect in the   posterior fossa or in the supratentorial region.     Evaluation of the osseous structures with the appropriate window appears   unremarkable. Vascular calcifications are noted. The right maxillary   sinus is small in size with opacification and osseous hyperostosis likely   due to chronic sinus disease. Mildmucosal thickening is seen in   bilateral ethmoid sinuses. The remaining visualized paranasal sinuses and   mastoid air cells are clear. Sequela of bilateral lens surgery is seen.    IMPRESSION:   No acute territorial infarct, hemorrhage or mass effect.              < end of copied text >    < from: CT Angio Head w/ IV Cont (10.11.19 @ 12:44) >  EXAM:  CT ANGIO NECK (W)AW IC                          EXAM:  CT ANGIO BRAIN (W)AW IC                            PROCEDURE DATE:  10/11/2019          INTERPRETATION:  CT angiogram head and neck with contrast    History dizziness x4-5 days    Contrast Omnipaque 90 cc; 10 cc discarded    Multiplanar MIPS included    Precontrast CT of the brain is negative for hemorrhage, cortical edema,   mass effect or hydrocephalus. Examination of the cervical circulation is   negative for carotid or vertebral artery dissection or occlusion or   significant stenosis. The vertebral arteries are codominant.    Examination of the intracranial circulation is negative for occlusion,   aneurysm or significant stenosis. There is normal venous sinus   enhancement.    IMPRESSION:    Normal CTA                DEMARCO CASTAÑEDA M.D., ATTENDING RADIOLOGIST  This document has been electronically signed. Oct 11 2019  1:11PM        < end of copied text >  < from: MR Head No Cont (10.11.19 @ 13:33) >  EXAM:  MR BRAIN                            PROCEDURE DATE:  10/11/2019          INTERPRETATION:  MRI brain without contrast    History dizziness    Comparison CT performed earlier the same day    There is no cortical edema, mass effect or hydrocephalus. There is trace   age typical scattered chronic microvascular ischemic change in the   subcortical white matter. There is no evidence of acute infarct or   previous parenchymal hemorrhage. The orbital and sellar contents and   cerebellar tonsils are within normal limits.    IMPRESSION:    Normal brain                DEMARCO CASTAÑEDA M.D., ATTENDING RADIOLOGIST  This document has been electronically signed. Oct 11 2019  2:14PM        < end of copied text >

## 2019-10-11 NOTE — H&P ADULT - PROBLEM SELECTOR PLAN 3
Thrombocytopenia. DDX Leukemia, Anemia, Viral inf,,    plts 126k on admission  not actively bleeding -  f/u HIV, Hepatitis panel  f/u CBC daily

## 2019-10-11 NOTE — H&P ADULT - PROBLEM SELECTOR PLAN 4
Pt with Hb 11.2/34.2, no active bleeding  Looks like Macrocytic as MCV is 100 and most likely due to hypothyroidism.  Baseline H/H 13.4/40.6  f/u FOBT  f/u iron studies

## 2019-10-11 NOTE — ED PROVIDER NOTE - OBJECTIVE STATEMENT
84 year old male PMH PE on xarelto, chronic back pain, BPH, HTN, hypothyroid coming in with 3 days of room spinning sensation and difficulty walking due to feeling off balance. Never had symptoms like this before. Pt also complains of cp, back pains. Denies fall or trauma. denies fevers, chills, sweats, neck pains, sob, palpitations, N/v/D/C< urinary complaints.

## 2019-10-11 NOTE — H&P ADULT - ASSESSMENT
83 y/o M from home,  lives alone, ambulates with cane with  PMH PE, chronic back pain, BPH, HTN, Hypothyroid, Insomnia and PSH of cholecystectomy, Hernia repair presented to ED with  Dizziness since 3 days of room spinning sensation and difficulty ambulating  due to feeling off balance. Never had symptoms like this before. He endorses he is shaking, mouth is dry, felt dizzy when he was getting out of bed. He walks independently but recently he is requiring  assistance and is using cane. He states his urinary stream is slowing down, Dysuria, Nocturia, Increased urinary frequency. He follows up with Dr Ayala for his BPH. He recently travelled to [facility] for 2 months and returned on September 10. Pt denies sick contacts,  bleeding,  chest pain, back pains,  fall, trauma, LOC, fevers, chills, sweats, neck pains, sob, palpitations, N/v/D/Constipation, weakness, numbness, tingling. Pt is admitted to Telemetry for Acute onset of vertigo.

## 2019-10-11 NOTE — CONSULT NOTE ADULT - ASSESSMENT
85yo male w/ dizziness c/w vertigo in setting of positive orthostatic hypotension and infection    Recommendation:    -Encourage IV hydration    -Continue Meclizine 25mg PO TID PRN    -Infectious workup per primary team    -PT/OT 85yo male w/ dizziness c/w vertigo in setting of positive orthostatic hypotension and infection    Recommendation:    -Encourage IV hydration    -Continue Meclizine 25mg PO TID PRN    -Infectious workup & treatment per primary team    -PT/OT 85yo male w/ dizziness c/w vertigo in setting of positive orthostatic hypotension and infection    Recommendations:    -Encourage IV hydration    -Continue Meclizine 25mg PO TID PRN Vertigo    -Infectious workup & treatment per primary team    -PT/OT

## 2019-10-11 NOTE — H&P ADULT - NSHPPHYSICALEXAM_GEN_ALL_CORE
CONSTITUTIONAL: Well appearing, well nourished, awake, alert and in no apparent distress  ENT: Airway patent, Nasal mucosa clear. Mouth with normal mucosa. Throat has no vesicles, no oropharyngeal exudates and uvula is midline.  EYES: Clear bilaterally, pupils equal, round and reactive to light. EOMI, Nystagmus is present   CARDIAC: Normal rate, regular rhythm.  Heart sounds S1, S2.  No murmurs, rubs or gallops   RESPIRATORY: Breath sounds clear and equal bilaterally. No wheezes, rales or rhonchi  MUSCULOSKELETAL: Spine appears normal, range of motion is not limited, no muscle or joint tenderness  EXTREMITIES: No edema, cyanosis or deformity   NEUROLOGICAL: Alert and oriented, no focal deficits, no motor or sensory deficits, Dysmetria is present.  SKIN: No rash, skin turgor

## 2019-10-11 NOTE — H&P ADULT - NSHPLABSRESULTS_GEN_ALL_CORE
LABS:                        11.2   12.93 )-----------( 126      ( 10 Oct 2019 23:03 )             34.2     10-10    136  |  103  |  25<H>  ----------------------------<  169<H>  3.6   |  27  |  1.45<H>    Ca    8.2<L>      10 Oct 2019 23:03    TPro  6.4  /  Alb  2.7<L>  /  TBili  0.6  /  DBili  x   /  AST  15  /  ALT  20  /  AlkPhos  52  10-10    PT/INR - ( 10 Oct 2019 23:03 )   PT: 12.6 sec;   INR: 1.13 ratio         PTT - ( 10 Oct 2019 23:03 )  PTT:28.4 sec    LIVER FUNCTIONS - ( 10 Oct 2019 23:03 )  Alb: 2.7 g/dL / Pro: 6.4 g/dL / ALK PHOS: 52 U/L / ALT: 20 U/L DA / AST: 15 U/L / GGT: x

## 2019-10-12 LAB
GLUCOSE BLDC GLUCOMTR-MCNC: 102 MG/DL — HIGH (ref 70–99)
GLUCOSE BLDC GLUCOMTR-MCNC: 103 MG/DL — HIGH (ref 70–99)
GLUCOSE BLDC GLUCOMTR-MCNC: 123 MG/DL — HIGH (ref 70–99)
GLUCOSE BLDC GLUCOMTR-MCNC: 90 MG/DL — SIGNIFICANT CHANGE UP (ref 70–99)
HCT VFR BLD CALC: 34 % — LOW (ref 39–50)
HGB BLD-MCNC: 11.1 G/DL — LOW (ref 13–17)
MCHC RBC-ENTMCNC: 32.6 GM/DL — SIGNIFICANT CHANGE UP (ref 32–36)
MCHC RBC-ENTMCNC: 32.9 PG — SIGNIFICANT CHANGE UP (ref 27–34)
MCV RBC AUTO: 100.9 FL — HIGH (ref 80–100)
NRBC # BLD: 0 /100 WBCS — SIGNIFICANT CHANGE UP (ref 0–0)
PLATELET # BLD AUTO: 134 K/UL — LOW (ref 150–400)
RBC # BLD: 3.37 M/UL — LOW (ref 4.2–5.8)
RBC # FLD: 13.3 % — SIGNIFICANT CHANGE UP (ref 10.3–14.5)
WBC # BLD: 8.13 K/UL — SIGNIFICANT CHANGE UP (ref 3.8–10.5)
WBC # FLD AUTO: 8.13 K/UL — SIGNIFICANT CHANGE UP (ref 3.8–10.5)

## 2019-10-12 PROCEDURE — 99233 SBSQ HOSP IP/OBS HIGH 50: CPT

## 2019-10-12 PROCEDURE — 76870 US EXAM SCROTUM: CPT | Mod: 26

## 2019-10-12 RX ORDER — ZOLPIDEM TARTRATE 10 MG/1
5 TABLET ORAL AT BEDTIME
Refills: 0 | Status: DISCONTINUED | OUTPATIENT
Start: 2019-10-12 | End: 2019-10-14

## 2019-10-12 RX ADMIN — LIDOCAINE 1 PATCH: 4 CREAM TOPICAL at 17:15

## 2019-10-12 RX ADMIN — LIDOCAINE 1 PATCH: 4 CREAM TOPICAL at 20:37

## 2019-10-12 RX ADMIN — Medication 650 MILLIGRAM(S): at 14:54

## 2019-10-12 RX ADMIN — LIDOCAINE 1 PATCH: 4 CREAM TOPICAL at 06:14

## 2019-10-12 RX ADMIN — Medication 180 MILLIGRAM(S): at 06:11

## 2019-10-12 RX ADMIN — Medication 100 MICROGRAM(S): at 06:11

## 2019-10-12 RX ADMIN — CEFTRIAXONE 100 MILLIGRAM(S): 500 INJECTION, POWDER, FOR SOLUTION INTRAMUSCULAR; INTRAVENOUS at 15:16

## 2019-10-12 RX ADMIN — TAMSULOSIN HYDROCHLORIDE 0.4 MILLIGRAM(S): 0.4 CAPSULE ORAL at 22:38

## 2019-10-12 RX ADMIN — Medication 650 MILLIGRAM(S): at 15:17

## 2019-10-12 RX ADMIN — ZOLPIDEM TARTRATE 5 MILLIGRAM(S): 10 TABLET ORAL at 01:48

## 2019-10-12 NOTE — PROGRESS NOTE ADULT - SUBJECTIVE AND OBJECTIVE BOX
Patient is a 84y old  Male who presents with a chief complaint of Dizziness (12 Oct 2019 23:43)    HPI:  83 y/o M from home,  lives alone, ambulates with cane with  PMH PE, chronic back pain, BPH, HTN, Hypothyroid, Insomnia and PSH of cholecystectomy, Hernia repair presented to ED with  Dizziness since 3 days of room spinning sensation and difficulty ambulating  due to feeling off balance. Never had symptoms like this before. He endorses he is shaking, mouth is dry, felt dizzy when he was getting out of bed. He walks independently but recently he is requiring  assistance and is using cane. He states his urinary stream is slowing down, Dysuria, Nocturia, Increased urinary frequency. He follows up with Dr Ayala for his BPH. He recently travelled to [facility] for 2 months and returned on September 10. Pt denies sick contacts,  bleeding,  chest pain, back pains,  fall, trauma, LOC, fevers, chills, sweats, neck pains, sob, palpitations, N/v/D/Constipation, weakness, numbness, tingling.  [facility] Full code (11 Oct 2019 01:46)    Today:   pt c/o testicular pain. Notes area is swollen. Symptoms started on day of admission.   Notes pain occurs when touching the area. No fevers, shaking. Denies dizziness today.     PAST MEDICAL & SURGICAL HISTORY:  Varicose veins  Other pulmonary embolism without acute cor pulmonale, unspecified chronicity: off Xarelto x 2 years  Chronic back pain  Herniated disc  Histoplasmosis  BPH (benign prostatic hypertrophy)  Renal cysts, acquired, bilateral  Insomnia  Hypothyroid  Hypertension  H/O hernia repair  S/P cholecystectomy  Nasal deviation: repaired  H/O surgical biopsy: lungs bilaterally  No significant past surgical history    MEDICATIONS  (STANDING):  atorvastatin 40 milliGRAM(s) Oral at bedtime  cefTRIAXone   IVPB 1000 milliGRAM(s) IV Intermittent every 24 hours  cefTRIAXone   IVPB      diltiazem    milliGRAM(s) Oral daily  enoxaparin Injectable 40 milliGRAM(s) SubCutaneous daily  influenza   Vaccine 0.5 milliLiter(s) IntraMuscular once  insulin lispro (HumaLOG) corrective regimen sliding scale   SubCutaneous Before meals and at bedtime  levothyroxine 100 MICROGram(s) Oral daily  lidocaine   Patch 1 Patch Transdermal daily  sodium chloride 0.9%. 1000 milliLiter(s) (100 mL/Hr) IV Continuous <Continuous>  tamsulosin 0.4 milliGRAM(s) Oral at bedtime    MEDICATIONS  (PRN):  acetaminophen   Tablet .. 650 milliGRAM(s) Oral every 6 hours PRN Moderate Pain (4 - 6)  glucagon  Injectable 1 milliGRAM(s) IntraMuscular once PRN Glucose <70 milliGRAM(s)/deciLiter  meclizine 25 milliGRAM(s) Oral every 8 hours PRN Dizziness  zolpidem 5 milliGRAM(s) Oral at bedtime PRN Insomnia  zolpidem 5 milliGRAM(s) Oral at bedtime PRN Insomnia  zolpidem 5 milliGRAM(s) Oral at bedtime PRN Insomnia    EXAM:  Vital Signs Last 24 Hrs  T(C): 36.5 (13 Oct 2019 05:24), Max: 36.6 (12 Oct 2019 23:57)  T(F): 97.7 (13 Oct 2019 05:24), Max: 97.8 (12 Oct 2019 23:57)  HR: 80 (13 Oct 2019 05:24) (76 - 80)  BP: 128/68 (13 Oct 2019 05:24) (125/66 - 137/66)  BP(mean): --  RR: 18 (13 Oct 2019 05:24) (18 - 18)  SpO2: 100% (13 Oct 2019 05:24) (97% - 100%)    PHYSICAL EXAM:  Constitutional: awake, elderly male in nad. wife and son present.   Neck: supple  Respiratory: bilaterally clear to auscultation, no wheezing, no rhonchi, no crackles, no decreased air entry  Cardiovascular: s1s2, rrr, no murmurs.   Gastrointestinal: soft, non tender, +bowel sounds, no rebound, no guarding.   Extremities: no edema.   : Left testicular area appears slightly red. Left testicle feels alonzo than right to palpation. No tenderness on palpation.   Neurological: alert and oriented to person, date and place.   Skin: intact no rash, warm to touch.   Psychiatric: appropriate affect.     LABS:  Urinalysis Basic - ( 11 Oct 2019 14:16 )    Color: Yellow / Appearance: Clear / S.010 / pH: x  Gluc: x / Ketone: Negative  / Bili: Negative / Urobili: Negative   Blood: x / Protein: 100 / Nitrite: Positive   Leuk Esterase: Moderate / RBC: 5-10 /HPF / WBC >50 /HPF   Sq Epi: x / Non Sq Epi: Occasional /HPF / Bacteria: Many /HPF                                11.1   7.66  )-----------( 146      ( 13 Oct 2019 05:35 )             34.8

## 2019-10-12 NOTE — CHART NOTE - NSCHARTNOTEFT_GEN_A_CORE
Informed by RN in AM that patient was complaining of L testicular swelling. Patient seen and examined at bedside, AAOx3, endorsing L testicular swelling and mild tenderness. Upon physical examination, L testicle noted to be swollen, mildly tender with some erythema. There's no inguinal rash/erythema or lesions on penis. Testicular US ordered to r/o torsion (even though there was a low suspicion), results came back positive for L epididymitis.     Plan:  c/w CTX 1g q24hrs  Follow up UCx  Gonorrhea/Chlamydia urine nucleic acid amplification   Scrotal elevation

## 2019-10-12 NOTE — PROGRESS NOTE ADULT - ASSESSMENT
Assessment and plan:  -Dizziness: Possibly due to orthostatic hypotension from dehydration, in the setting of UTI  CTA head and neck and MRI of brain Neg  Carotid doppler neg for stenosis  TTE noted: severe LA dilatation, normal EF  Neuro appreciated. Will cw meclizine as needed.   PT eval    -UTI  Cont Ceftriaxone   Urine cx    -Testicular pain:   US consistent with epididymitis.   Pt is on rocephin.   will follow infectious workup.     - TRACI due to dehydration   Improved with IVF  US renal and bladder noted, no hydronephrosis    - Anemia and thrombocytopenia  Possibly due to Vitamin B12 deficiency ( level in low normal side)  Will obtain MMA level  TSH normal     - BPH:   Cont Tamsulosin     -HTN:  Hold Losartan and Diltiazem now  Restart when clinically appropriate    -Prophylaxis:  Lovenox for DVT prophylaxis  Fall precaution  Disposition: pending PT eval, patient lives with wife and son at home.

## 2019-10-12 NOTE — PROGRESS NOTE ADULT - SUBJECTIVE AND OBJECTIVE BOX
NOTE TO BE COMPLETED - PLEASE REFER TO ABOVE ONLY AND IGNORE INFORMATION BELOW    Neurology Follow up note    Name  MP ALCAZAR    HPI:  83 y/o M from home,  lives alone, ambulates with cane with  PMH PE, chronic back pain, BPH, HTN, Hypothyroid, Insomnia and PSH of cholecystectomy, Hernia repair presented to ED with  Dizziness since 3 days of room spinning sensation and difficulty ambulating  due to feeling off balance. Never had symptoms like this before. He endorses he is shaking, mouth is dry, felt dizzy when he was getting out of bed. He walks independently but recently he is requiring  assistance and is using cane. He states his urinary stream is slowing down, Dysuria, Nocturia, Increased urinary frequency. He follows up with Dr Ayala for his BPH. He recently travelled to [facility] for 2 months and returned on September 10. Pt denies sick contacts,  bleeding,  chest pain, back pains,  fall, trauma, LOC, fevers, chills, sweats, neck pains, sob, palpitations, N/v/D/Constipation, weakness, numbness, tingling.  [facility] Full code (11 Oct 2019 01:46)      Interval History -        Subjective:        MEDICATIONS  (STANDING):  atorvastatin 40 milliGRAM(s) Oral at bedtime  cefTRIAXone   IVPB 1000 milliGRAM(s) IV Intermittent every 24 hours  cefTRIAXone   IVPB      diltiazem    milliGRAM(s) Oral daily  enoxaparin Injectable 40 milliGRAM(s) SubCutaneous daily  influenza   Vaccine 0.5 milliLiter(s) IntraMuscular once  insulin lispro (HumaLOG) corrective regimen sliding scale   SubCutaneous Before meals and at bedtime  levothyroxine 100 MICROGram(s) Oral daily  lidocaine   Patch 1 Patch Transdermal daily  sodium chloride 0.9%. 1000 milliLiter(s) (100 mL/Hr) IV Continuous <Continuous>  tamsulosin 0.4 milliGRAM(s) Oral at bedtime    MEDICATIONS  (PRN):  acetaminophen   Tablet .. 650 milliGRAM(s) Oral every 6 hours PRN Moderate Pain (4 - 6)  glucagon  Injectable 1 milliGRAM(s) IntraMuscular once PRN Glucose <70 milliGRAM(s)/deciLiter  meclizine 25 milliGRAM(s) Oral every 8 hours PRN Dizziness  zolpidem 5 milliGRAM(s) Oral at bedtime PRN Insomnia  zolpidem 5 milliGRAM(s) Oral at bedtime PRN Insomnia  zolpidem 5 milliGRAM(s) Oral at bedtime PRN Insomnia      Allergies    No Known Allergies    Intolerances        Review of Systems:  General: [ ] None, [ ] chills, [ ]fatigue, [ ] fevers  Skin: [ ] None, [ ] rash   HEENT: [ ] None, [ ] head injury, [ ] blurred vision, [ ] double vision, [ ] eye pain, [ ] visual loss, [ ] hearing loss, [ ] deafness, [ ] ear pain, [ ] ringing in the ears, [ ] vertigo, [ ] sinus pain, [ ] voice changes  Neck: [ ] None, [ ] neck stiffness  Respiratory: [ ] None, [ ] cough, [ ] difficulty breathing  Cardiovascular: [ ] None, [ ] calf cramps, [ ] chest pain, [ ] leg pain, [ ] swelling, [ ] rapid heart rate, [ ] shortness of breath  Gastrointestinal: [ ] None, [ ] abdominal pain, [ ] nausea, [ ] vomiting  Musculoskeletal: [ ] None, [ ] back pain, [ ] joint pain, [ ] joint stiffness, [ ] leg cramps, [ ] muscle atrophy, [ ] muscle cramps, [ ] muscle weakness, [ ] swelling of extremities  Neurological: [ ] None, [ ] Dizziness, [ ] decreased memory, [ ] fainting, [ ] focal neurological symptoms, [ ] headaches, [ ] incontinence of stool, [ ] incontinence of urine, [ ] loss of consciousness, [ ] numbness, [ ] seizures, [ ] spinning sensation, [ ] stroke, [ ] trouble walking, [ ] unsteadiness, [ ] visual changes, [ ] weakness  Psychiatric: [ ] None,  [ ] depression, [ ] anxiety, [ ] hallucinations, [ ] inability to concentrate, [ ] mood changes, [ ] panic attacks  Hematology: [ ] None,  [ ] blood clots, [ ] spontaneous bleeding      Objective:   Vital Signs Last 24 Hrs  T(C): 36.4 (12 Oct 2019 16:05), Max: 36.5 (12 Oct 2019 07:36)  T(F): 97.6 (12 Oct 2019 16:05), Max: 97.7 (12 Oct 2019 07:36)  HR: 76 (12 Oct 2019 16:05) (73 - 81)  BP: 125/66 (12 Oct 2019 16:05) (121/52 - 129/41)  BP(mean): --  RR: 18 (12 Oct 2019 16:05) (17 - 18)  SpO2: 97% (12 Oct 2019 16:05) (95% - 97%)    General Exam:   General appearance: No acute distress                 Cardiovascular: Pedal dorsalis pulses intact bilaterally    Neurological Exam:  Mental Status: Orientated to self, date and place.  Attention intact.  No dysarthria, aphasia or neglect.  Knowledge intact.  Registration intact.  Short and long term memory grossly intact.      Cranial Nerves: CN I - not tested.  PERRL, EOMI, VFF, no nystagmus or diplopia.  No APD.  Fundi not visualized bilaterally.  CN V1-3 intact to light touch and pinprick.  No facial asymmetry.  Hearing intact to finger rub bilaterally.  Tongue, uvula and palate midline.  Sternocleidomastoid and Trapezius intact bilaterally.    Motor:   Tone: normal.                  Strength: intact throughout  Pronator drift: none                 Dysmeria: None to finger-nose-finger or heel-shin-heel  No truncal ataxia.    Tremor: No resting, postural or action tremor.  No myoclonus.    Sensation: intact to light touch, pinprick, vibration and proprioception    Deep Tendon Reflexes: 1+ bilateral biceps, triceps, brachioradialis, knee and ankle  Toes flexor bilaterally    Gait: normal and stable.      Other:    10-11    139  |  106  |  23<H>  ----------------------------<  97  4.2   |  29  |  1.27    Ca    8.4      11 Oct 2019 06:16  Phos  2.6     10-11  Mg     2.4     10-11    TPro  6.3  /  Alb  2.7<L>  /  TBili  0.7  /  DBili  0.2  /  AST  20  /  ALT  20  /  AlkPhos  50  10-11    10-11    139  |  106  |  23<H>  ----------------------------<  97  4.2   |  29  |  1.27    Ca    8.4      11 Oct 2019 06:16  Phos  2.6     10-11  Mg     2.4     10-11    TPro  6.3  /  Alb  2.7<L>  /  TBili  0.7  /  DBili  0.2  /  AST  20  /  ALT  20  /  AlkPhos  50  10-11    LIVER FUNCTIONS - ( 11 Oct 2019 06:16 )  Alb: 2.7 g/dL / Pro: 6.3 g/dL / ALK PHOS: 50 U/L / ALT: 20 U/L DA / AST: 20 U/L / GGT: x             Radiology    EKG:  tele:  TTE:  EEG:                 Please contact the Neurology consult service with any questions.    Mitesh Gallego MD  Neurology Attending  [facility] No new dizziness today. Normal neuro exam, with no nystagmus today.    Dx: Lightheadedness in setting of UTI    Recs:  - May continue meclizine PRN vertigo  - Please add losartan 100mg daily for hypertension (missing home medication)  - Please contact Neurology consult team with any concerns for new neurological symptoms        NOTE TO BE COMPLETED - PLEASE REFER TO ABOVE ONLY AND IGNORE INFORMATION BELOW    Neurology Follow up note    Name  MP ALCAZAR    HPI:  83 y/o M from home,  lives alone, ambulates with cane with  PMH PE, chronic back pain, BPH, HTN, Hypothyroid, Insomnia and PSH of cholecystectomy, Hernia repair presented to ED with  Dizziness since 3 days of room spinning sensation and difficulty ambulating  due to feeling off balance. Never had symptoms like this before. He endorses he is shaking, mouth is dry, felt dizzy when he was getting out of bed. He walks independently but recently he is requiring  assistance and is using cane. He states his urinary stream is slowing down, Dysuria, Nocturia, Increased urinary frequency. He follows up with Dr Ayala for his BPH. He recently travelled to [facility] for 2 months and returned on September 10. Pt denies sick contacts,  bleeding,  chest pain, back pains,  fall, trauma, LOC, fevers, chills, sweats, neck pains, sob, palpitations, N/v/D/Constipation, weakness, numbness, tingling.  [facility] Full code (11 Oct 2019 01:46)      Interval History -        Subjective:        MEDICATIONS  (STANDING):  atorvastatin 40 milliGRAM(s) Oral at bedtime  cefTRIAXone   IVPB 1000 milliGRAM(s) IV Intermittent every 24 hours  cefTRIAXone   IVPB      diltiazem    milliGRAM(s) Oral daily  enoxaparin Injectable 40 milliGRAM(s) SubCutaneous daily  influenza   Vaccine 0.5 milliLiter(s) IntraMuscular once  insulin lispro (HumaLOG) corrective regimen sliding scale   SubCutaneous Before meals and at bedtime  levothyroxine 100 MICROGram(s) Oral daily  lidocaine   Patch 1 Patch Transdermal daily  sodium chloride 0.9%. 1000 milliLiter(s) (100 mL/Hr) IV Continuous <Continuous>  tamsulosin 0.4 milliGRAM(s) Oral at bedtime    MEDICATIONS  (PRN):  acetaminophen   Tablet .. 650 milliGRAM(s) Oral every 6 hours PRN Moderate Pain (4 - 6)  glucagon  Injectable 1 milliGRAM(s) IntraMuscular once PRN Glucose <70 milliGRAM(s)/deciLiter  meclizine 25 milliGRAM(s) Oral every 8 hours PRN Dizziness  zolpidem 5 milliGRAM(s) Oral at bedtime PRN Insomnia  zolpidem 5 milliGRAM(s) Oral at bedtime PRN Insomnia  zolpidem 5 milliGRAM(s) Oral at bedtime PRN Insomnia      Allergies    No Known Allergies    Intolerances        Review of Systems:  General: [ ] None, [ ] chills, [ ]fatigue, [ ] fevers  Skin: [ ] None, [ ] rash   HEENT: [ ] None, [ ] head injury, [ ] blurred vision, [ ] double vision, [ ] eye pain, [ ] visual loss, [ ] hearing loss, [ ] deafness, [ ] ear pain, [ ] ringing in the ears, [ ] vertigo, [ ] sinus pain, [ ] voice changes  Neck: [ ] None, [ ] neck stiffness  Respiratory: [ ] None, [ ] cough, [ ] difficulty breathing  Cardiovascular: [ ] None, [ ] calf cramps, [ ] chest pain, [ ] leg pain, [ ] swelling, [ ] rapid heart rate, [ ] shortness of breath  Gastrointestinal: [ ] None, [ ] abdominal pain, [ ] nausea, [ ] vomiting  Musculoskeletal: [ ] None, [ ] back pain, [ ] joint pain, [ ] joint stiffness, [ ] leg cramps, [ ] muscle atrophy, [ ] muscle cramps, [ ] muscle weakness, [ ] swelling of extremities  Neurological: [ ] None, [ ] Dizziness, [ ] decreased memory, [ ] fainting, [ ] focal neurological symptoms, [ ] headaches, [ ] incontinence of stool, [ ] incontinence of urine, [ ] loss of consciousness, [ ] numbness, [ ] seizures, [ ] spinning sensation, [ ] stroke, [ ] trouble walking, [ ] unsteadiness, [ ] visual changes, [ ] weakness  Psychiatric: [ ] None,  [ ] depression, [ ] anxiety, [ ] hallucinations, [ ] inability to concentrate, [ ] mood changes, [ ] panic attacks  Hematology: [ ] None,  [ ] blood clots, [ ] spontaneous bleeding      Objective:   Vital Signs Last 24 Hrs  T(C): 36.4 (12 Oct 2019 16:05), Max: 36.5 (12 Oct 2019 07:36)  T(F): 97.6 (12 Oct 2019 16:05), Max: 97.7 (12 Oct 2019 07:36)  HR: 76 (12 Oct 2019 16:05) (73 - 81)  BP: 125/66 (12 Oct 2019 16:05) (121/52 - 129/41)  BP(mean): --  RR: 18 (12 Oct 2019 16:05) (17 - 18)  SpO2: 97% (12 Oct 2019 16:05) (95% - 97%)    General Exam:   General appearance: No acute distress                 Cardiovascular: Pedal dorsalis pulses intact bilaterally    Neurological Exam:  Mental Status: Orientated to self, date and place.  Attention intact.  No dysarthria, aphasia or neglect.  Knowledge intact.  Registration intact.  Short and long term memory grossly intact.      Cranial Nerves: CN I - not tested.  PERRL, EOMI, VFF, no nystagmus or diplopia.  No APD.  Fundi not visualized bilaterally.  CN V1-3 intact to light touch and pinprick.  No facial asymmetry.  Hearing intact to finger rub bilaterally.  Tongue, uvula and palate midline.  Sternocleidomastoid and Trapezius intact bilaterally.    Motor:   Tone: normal.                  Strength: intact throughout  Pronator drift: none                 Dysmeria: None to finger-nose-finger or heel-shin-heel  No truncal ataxia.    Tremor: No resting, postural or action tremor.  No myoclonus.    Sensation: intact to light touch, pinprick, vibration and proprioception    Deep Tendon Reflexes: 1+ bilateral biceps, triceps, brachioradialis, knee and ankle  Toes flexor bilaterally    Gait: normal and stable.      Other:    10-11    139  |  106  |  23<H>  ----------------------------<  97  4.2   |  29  |  1.27    Ca    8.4      11 Oct 2019 06:16  Phos  2.6     10-11  Mg     2.4     10-11    TPro  6.3  /  Alb  2.7<L>  /  TBili  0.7  /  DBili  0.2  /  AST  20  /  ALT  20  /  AlkPhos  50  10-11    10-11    139  |  106  |  23<H>  ----------------------------<  97  4.2   |  29  |  1.27    Ca    8.4      11 Oct 2019 06:16  Phos  2.6     10-11  Mg     2.4     10-11    TPro  6.3  /  Alb  2.7<L>  /  TBili  0.7  /  DBili  0.2  /  AST  20  /  ALT  20  /  AlkPhos  50  10-11    LIVER FUNCTIONS - ( 11 Oct 2019 06:16 )  Alb: 2.7 g/dL / Pro: 6.3 g/dL / ALK PHOS: 50 U/L / ALT: 20 U/L DA / AST: 20 U/L / GGT: x             Radiology    EKG:  tele:  TTE:  EEG:                 Please contact the Neurology consult service with any questions.    Mitesh Gallego MD  Neurology Attending  [facility] No new dizziness today. Normal neuro exam, with no nystagmus today.    Dx: Lightheadedness in setting of UTI    Recs:  - May continue meclizine PRN vertigo  - Please resume losartan 100mg daily for hypertension from home medication list when appropriate  - Please contact Neurology consult team with any concerns for new neurological symptoms        NOTE TO BE COMPLETED - PLEASE REFER TO ABOVE ONLY AND IGNORE INFORMATION BELOW    Neurology Follow up note    Name  MP ALCAZAR    HPI:  83 y/o M from home,  lives alone, ambulates with cane with  PMH PE, chronic back pain, BPH, HTN, Hypothyroid, Insomnia and PSH of cholecystectomy, Hernia repair presented to ED with  Dizziness since 3 days of room spinning sensation and difficulty ambulating  due to feeling off balance. Never had symptoms like this before. He endorses he is shaking, mouth is dry, felt dizzy when he was getting out of bed. He walks independently but recently he is requiring  assistance and is using cane. He states his urinary stream is slowing down, Dysuria, Nocturia, Increased urinary frequency. He follows up with Dr Ayala for his BPH. He recently travelled to [facility] for 2 months and returned on September 10. Pt denies sick contacts,  bleeding,  chest pain, back pains,  fall, trauma, LOC, fevers, chills, sweats, neck pains, sob, palpitations, N/v/D/Constipation, weakness, numbness, tingling.  [facility] Full code (11 Oct 2019 01:46)      Interval History -        Subjective:        MEDICATIONS  (STANDING):  atorvastatin 40 milliGRAM(s) Oral at bedtime  cefTRIAXone   IVPB 1000 milliGRAM(s) IV Intermittent every 24 hours  cefTRIAXone   IVPB      diltiazem    milliGRAM(s) Oral daily  enoxaparin Injectable 40 milliGRAM(s) SubCutaneous daily  influenza   Vaccine 0.5 milliLiter(s) IntraMuscular once  insulin lispro (HumaLOG) corrective regimen sliding scale   SubCutaneous Before meals and at bedtime  levothyroxine 100 MICROGram(s) Oral daily  lidocaine   Patch 1 Patch Transdermal daily  sodium chloride 0.9%. 1000 milliLiter(s) (100 mL/Hr) IV Continuous <Continuous>  tamsulosin 0.4 milliGRAM(s) Oral at bedtime    MEDICATIONS  (PRN):  acetaminophen   Tablet .. 650 milliGRAM(s) Oral every 6 hours PRN Moderate Pain (4 - 6)  glucagon  Injectable 1 milliGRAM(s) IntraMuscular once PRN Glucose <70 milliGRAM(s)/deciLiter  meclizine 25 milliGRAM(s) Oral every 8 hours PRN Dizziness  zolpidem 5 milliGRAM(s) Oral at bedtime PRN Insomnia  zolpidem 5 milliGRAM(s) Oral at bedtime PRN Insomnia  zolpidem 5 milliGRAM(s) Oral at bedtime PRN Insomnia      Allergies    No Known Allergies    Intolerances        Review of Systems:  General: [ ] None, [ ] chills, [ ]fatigue, [ ] fevers  Skin: [ ] None, [ ] rash   HEENT: [ ] None, [ ] head injury, [ ] blurred vision, [ ] double vision, [ ] eye pain, [ ] visual loss, [ ] hearing loss, [ ] deafness, [ ] ear pain, [ ] ringing in the ears, [ ] vertigo, [ ] sinus pain, [ ] voice changes  Neck: [ ] None, [ ] neck stiffness  Respiratory: [ ] None, [ ] cough, [ ] difficulty breathing  Cardiovascular: [ ] None, [ ] calf cramps, [ ] chest pain, [ ] leg pain, [ ] swelling, [ ] rapid heart rate, [ ] shortness of breath  Gastrointestinal: [ ] None, [ ] abdominal pain, [ ] nausea, [ ] vomiting  Musculoskeletal: [ ] None, [ ] back pain, [ ] joint pain, [ ] joint stiffness, [ ] leg cramps, [ ] muscle atrophy, [ ] muscle cramps, [ ] muscle weakness, [ ] swelling of extremities  Neurological: [ ] None, [ ] Dizziness, [ ] decreased memory, [ ] fainting, [ ] focal neurological symptoms, [ ] headaches, [ ] incontinence of stool, [ ] incontinence of urine, [ ] loss of consciousness, [ ] numbness, [ ] seizures, [ ] spinning sensation, [ ] stroke, [ ] trouble walking, [ ] unsteadiness, [ ] visual changes, [ ] weakness  Psychiatric: [ ] None,  [ ] depression, [ ] anxiety, [ ] hallucinations, [ ] inability to concentrate, [ ] mood changes, [ ] panic attacks  Hematology: [ ] None,  [ ] blood clots, [ ] spontaneous bleeding      Objective:   Vital Signs Last 24 Hrs  T(C): 36.4 (12 Oct 2019 16:05), Max: 36.5 (12 Oct 2019 07:36)  T(F): 97.6 (12 Oct 2019 16:05), Max: 97.7 (12 Oct 2019 07:36)  HR: 76 (12 Oct 2019 16:05) (73 - 81)  BP: 125/66 (12 Oct 2019 16:05) (121/52 - 129/41)  BP(mean): --  RR: 18 (12 Oct 2019 16:05) (17 - 18)  SpO2: 97% (12 Oct 2019 16:05) (95% - 97%)    General Exam:   General appearance: No acute distress                 Cardiovascular: Pedal dorsalis pulses intact bilaterally    Neurological Exam:  Mental Status: Orientated to self, date and place.  Attention intact.  No dysarthria, aphasia or neglect.  Knowledge intact.  Registration intact.  Short and long term memory grossly intact.      Cranial Nerves: CN I - not tested.  PERRL, EOMI, VFF, no nystagmus or diplopia.  No APD.  Fundi not visualized bilaterally.  CN V1-3 intact to light touch and pinprick.  No facial asymmetry.  Hearing intact to finger rub bilaterally.  Tongue, uvula and palate midline.  Sternocleidomastoid and Trapezius intact bilaterally.    Motor:   Tone: normal.                  Strength: intact throughout  Pronator drift: none                 Dysmeria: None to finger-nose-finger or heel-shin-heel  No truncal ataxia.    Tremor: No resting, postural or action tremor.  No myoclonus.    Sensation: intact to light touch, pinprick, vibration and proprioception    Deep Tendon Reflexes: 1+ bilateral biceps, triceps, brachioradialis, knee and ankle  Toes flexor bilaterally    Gait: normal and stable.      Other:    10-11    139  |  106  |  23<H>  ----------------------------<  97  4.2   |  29  |  1.27    Ca    8.4      11 Oct 2019 06:16  Phos  2.6     10-11  Mg     2.4     10-11    TPro  6.3  /  Alb  2.7<L>  /  TBili  0.7  /  DBili  0.2  /  AST  20  /  ALT  20  /  AlkPhos  50  10-11    10-11    139  |  106  |  23<H>  ----------------------------<  97  4.2   |  29  |  1.27    Ca    8.4      11 Oct 2019 06:16  Phos  2.6     10-11  Mg     2.4     10-11    TPro  6.3  /  Alb  2.7<L>  /  TBili  0.7  /  DBili  0.2  /  AST  20  /  ALT  20  /  AlkPhos  50  10-11    LIVER FUNCTIONS - ( 11 Oct 2019 06:16 )  Alb: 2.7 g/dL / Pro: 6.3 g/dL / ALK PHOS: 50 U/L / ALT: 20 U/L DA / AST: 20 U/L / GGT: x             Radiology    EKG:  tele:  TTE:  EEG:                 Please contact the Neurology consult service with any questions.    Mitesh Gallego MD  Neurology Attending  [facility] Neurology Follow up note    Name  MP ALCAZAR    HPI:  85 y/o M from home,  lives alone, ambulates with cane with  PMH PE, chronic back pain, BPH, HTN, Hypothyroid, Insomnia and PSH of cholecystectomy, Hernia repair presented to ED with  Dizziness since 3 days of room spinning sensation and difficulty ambulating  due to feeling off balance. Never had symptoms like this before. He endorses he is shaking, mouth is dry, felt dizzy when he was getting out of bed. He walks independently but recently he is requiring  assistance and is using cane. He states his urinary stream is slowing down, Dysuria, Nocturia, Increased urinary frequency. He follows up with Dr Ayala for his BPH. He recently travelled to [facility] for 2 months and returned on September 10. Pt denies sick contacts,  bleeding,  chest pain, back pains,  fall, trauma, LOC, fevers, chills, sweats, neck pains, sob, palpitations, N/v/D/Constipation, weakness, numbness, tingling.  [facility] Full code (11 Oct 2019 01:46)    Neuro HPI:  Pt reports dizziness, described as room spinning while lying flat and walking, x 3d.  Pt denies HA, ringing in ears or other sxms.      Interval History -  The patient has had no new dizzy episodes today.    MEDICATIONS  (STANDING):  atorvastatin 40 milliGRAM(s) Oral at bedtime  cefTRIAXone   IVPB 1000 milliGRAM(s) IV Intermittent every 24 hours  cefTRIAXone   IVPB      diltiazem    milliGRAM(s) Oral daily  enoxaparin Injectable 40 milliGRAM(s) SubCutaneous daily  influenza   Vaccine 0.5 milliLiter(s) IntraMuscular once  insulin lispro (HumaLOG) corrective regimen sliding scale   SubCutaneous Before meals and at bedtime  levothyroxine 100 MICROGram(s) Oral daily  lidocaine   Patch 1 Patch Transdermal daily  sodium chloride 0.9%. 1000 milliLiter(s) (100 mL/Hr) IV Continuous <Continuous>  tamsulosin 0.4 milliGRAM(s) Oral at bedtime    MEDICATIONS  (PRN):  acetaminophen   Tablet .. 650 milliGRAM(s) Oral every 6 hours PRN Moderate Pain (4 - 6)  glucagon  Injectable 1 milliGRAM(s) IntraMuscular once PRN Glucose <70 milliGRAM(s)/deciLiter  meclizine 25 milliGRAM(s) Oral every 8 hours PRN Dizziness  zolpidem 5 milliGRAM(s) Oral at bedtime PRN Insomnia  zolpidem 5 milliGRAM(s) Oral at bedtime PRN Insomnia  zolpidem 5 milliGRAM(s) Oral at bedtime PRN Insomnia      Allergies  No Known Allergies    Review of Systems: Fourteen systems reviewed and negative except as in HPI / Interval History.      Objective:   Vital Signs Last 24 Hrs  T(C): 36.4 (12 Oct 2019 16:05), Max: 36.5 (12 Oct 2019 07:36)  T(F): 97.6 (12 Oct 2019 16:05), Max: 97.7 (12 Oct 2019 07:36)  HR: 76 (12 Oct 2019 16:05) (73 - 81)  BP: 125/66 (12 Oct 2019 16:05) (121/52 - 129/41)  RR: 18 (12 Oct 2019 16:05) (17 - 18)  SpO2: 97% (12 Oct 2019 16:05) (95% - 97%)    General Exam:  General: No acute distress  Respiratory: CTAB/l.  No crackles, rhonchi, or wheezes.  Cardiovascular: RRR, No murmurs, Full b/l radial and pedal pulses    Neurological Exam:  General / Mental Status: Oriented to person, place, and time.  No dysarthria or aphasia present.  Naming and repetition intact.  Cranial Nerves: PERRLA, EOMI x 2, VFF x 4, No nystagmus or diplopia,.  B/l V1-V3 equal to light touch and pinprick.  Symmetric facial movement and palate elevation.  B/l hearing equal to finger rub.  Negative Tom-Hallpike maneuver b/l.  5/5 strength with b/l sternocleidomastoid and trapezius.  Midline tongue protrusion with no atrophy or fasciculations.  Motor: Normal bulk and tone in all four extremities.  At least 4/5 strength throughout all four extremities.  No downward drift, rigidity, or spasticity in any of the four extremities.  Sensation: Intact to light touch and pinprick in all four extremities.  Coordination: No dysmetria with b/l finger-to-nose and heel raise tests.  Reflexes: 2+ and symmetric at b/l biceps, triceps, brachioradialis, patellae, and ankles.  Toes flexor b/l.  Gait & Romberg deferred per patient request.      Labs:    10-11    139  |  106  |  23<H>  ----------------------------<  97  4.2   |  29  |  1.27    Ca    8.4      11 Oct 2019 06:16  Phos  2.6     10-11  Mg     2.4     10-11    TPro  6.3  /  Alb  2.7<L>  /  TBili  0.7  /  DBili  0.2  /  AST  20  /  ALT  20  /  AlkPhos  50  10-11    Hemoglobin A1C, Whole Blood (10.11.19 @ 10:22)    Hemoglobin A1C, Whole Blood: 5.3%    Vitamin B12, Serum (10.11.19 @ 09:51)    Vitamin B12, Serum: 354 pg/mL    Folate, Serum (10.11.19 @ 09:51)    Folate, Serum: 11.0 ng/mL    Thyroid Stimulating Hormone, Serum (10.11.19 @ 06:16)    Thyroid Stimulating Hormone, Serum: 1.40 uU/mL      Neuroimaging:    CT Head & CTA Head/Neck (10/11/19):  - No acute intracranial abnormalities  - No intracranial and neck vascular abnormalities    MRI Brain (10/11/19):  - No acute intracranial abnormalities  - Chronic microvascular disease      Assessment:  84 RHM with lightheadedness in the setting of urinary tract infection, also with hypotension      Recommendations:    - May continue meclizine PRN vertigo    - Continue treating underlying infection    - Please resume losartan 100mg daily for hypertension from home medication list when appropriate    - Patient counseled that hospital alternative for zolpidem 10mg QHS is zolpidem 5mg x 2 one hour apart    - Please contact Neurology consult team with any concerns for new neurological symptoms      Please contact the Neurology consult service with any questions.    Mitesh Gallego MD  Neurology Attending  [facility]

## 2019-10-13 LAB
GLUCOSE BLDC GLUCOMTR-MCNC: 102 MG/DL — HIGH (ref 70–99)
GLUCOSE BLDC GLUCOMTR-MCNC: 107 MG/DL — HIGH (ref 70–99)
GLUCOSE BLDC GLUCOMTR-MCNC: 108 MG/DL — HIGH (ref 70–99)
GLUCOSE BLDC GLUCOMTR-MCNC: 133 MG/DL — HIGH (ref 70–99)
HCT VFR BLD CALC: 34.8 % — LOW (ref 39–50)
HGB BLD-MCNC: 11.1 G/DL — LOW (ref 13–17)
MCHC RBC-ENTMCNC: 31.9 GM/DL — LOW (ref 32–36)
MCHC RBC-ENTMCNC: 32.2 PG — SIGNIFICANT CHANGE UP (ref 27–34)
MCV RBC AUTO: 100.9 FL — HIGH (ref 80–100)
NRBC # BLD: 0 /100 WBCS — SIGNIFICANT CHANGE UP (ref 0–0)
PLATELET # BLD AUTO: 146 K/UL — LOW (ref 150–400)
RBC # BLD: 3.45 M/UL — LOW (ref 4.2–5.8)
RBC # FLD: 13.2 % — SIGNIFICANT CHANGE UP (ref 10.3–14.5)
WBC # BLD: 7.66 K/UL — SIGNIFICANT CHANGE UP (ref 3.8–10.5)
WBC # FLD AUTO: 7.66 K/UL — SIGNIFICANT CHANGE UP (ref 3.8–10.5)

## 2019-10-13 PROCEDURE — 99233 SBSQ HOSP IP/OBS HIGH 50: CPT | Mod: GC

## 2019-10-13 RX ADMIN — Medication 180 MILLIGRAM(S): at 06:12

## 2019-10-13 RX ADMIN — ZOLPIDEM TARTRATE 5 MILLIGRAM(S): 10 TABLET ORAL at 22:13

## 2019-10-13 RX ADMIN — LIDOCAINE 1 PATCH: 4 CREAM TOPICAL at 06:15

## 2019-10-13 RX ADMIN — ENOXAPARIN SODIUM 40 MILLIGRAM(S): 100 INJECTION SUBCUTANEOUS at 11:47

## 2019-10-13 RX ADMIN — Medication 100 MICROGRAM(S): at 06:12

## 2019-10-13 RX ADMIN — ZOLPIDEM TARTRATE 5 MILLIGRAM(S): 10 TABLET ORAL at 00:33

## 2019-10-13 RX ADMIN — TAMSULOSIN HYDROCHLORIDE 0.4 MILLIGRAM(S): 0.4 CAPSULE ORAL at 21:48

## 2019-10-13 RX ADMIN — ATORVASTATIN CALCIUM 40 MILLIGRAM(S): 80 TABLET, FILM COATED ORAL at 21:48

## 2019-10-13 RX ADMIN — ZOLPIDEM TARTRATE 5 MILLIGRAM(S): 10 TABLET ORAL at 01:43

## 2019-10-13 RX ADMIN — CEFTRIAXONE 100 MILLIGRAM(S): 500 INJECTION, POWDER, FOR SOLUTION INTRAMUSCULAR; INTRAVENOUS at 15:02

## 2019-10-13 NOTE — PROGRESS NOTE ADULT - SUBJECTIVE AND OBJECTIVE BOX
Patient is a 84y old  Male who presents with a chief complaint of Dizziness (12 Oct 2019 23:43)    Today:  Patient was seen and examined at bedside  Reports Dizziness has resolved  Was able to walk with PT   Reports pain in testicles has resolved too, swelling is same as yesterday    Vitals stable, afebrile     REVIEW OF SYSTEMS: denies fever, chills, SOB, palpitations, chest pain, abdominal pain    MEDICATIONS  (STANDING):  atorvastatin 40 milliGRAM(s) Oral at bedtime  cefTRIAXone   IVPB 1000 milliGRAM(s) IV Intermittent every 24 hours  cefTRIAXone   IVPB      diltiazem    milliGRAM(s) Oral daily  enoxaparin Injectable 40 milliGRAM(s) SubCutaneous daily  influenza   Vaccine 0.5 milliLiter(s) IntraMuscular once  insulin lispro (HumaLOG) corrective regimen sliding scale   SubCutaneous Before meals and at bedtime  levothyroxine 100 MICROGram(s) Oral daily  lidocaine   Patch 1 Patch Transdermal daily  sodium chloride 0.9%. 1000 milliLiter(s) (100 mL/Hr) IV Continuous <Continuous>  tamsulosin 0.4 milliGRAM(s) Oral at bedtime    MEDICATIONS  (PRN):  acetaminophen   Tablet .. 650 milliGRAM(s) Oral every 6 hours PRN Moderate Pain (4 - 6)  glucagon  Injectable 1 milliGRAM(s) IntraMuscular once PRN Glucose <70 milliGRAM(s)/deciLiter  meclizine 25 milliGRAM(s) Oral every 8 hours PRN Dizziness  zolpidem 5 milliGRAM(s) Oral at bedtime PRN Insomnia  zolpidem 5 milliGRAM(s) Oral at bedtime PRN Insomnia  zolpidem 5 milliGRAM(s) Oral at bedtime PRN Insomnia      PHYSICAL EXAM:  GENERAL: NAD, well-groomed, well-developed  NERVOUS SYSTEM:  Alert & Oriented X3, Good concentration; Motor Strength 5/5 B/L upper and lower extremities; gait was not tested   CHEST/LUNG: Clear to percussion bilaterally; No rales, rhonchi, wheezing, or rubs  HEART: Regular rate and rhythm; No murmurs, rubs, or gallops  ABDOMEN: Soft, Nontender, Nondistended; Bowel sounds present  Scrotum: Left sided swelling and mild erythema, non tender        LABS:                        11.1   7.66  )-----------( 146      ( 13 Oct 2019 05:35 )             34.8     CAPILLARY BLOOD GLUCOSE    POCT Blood Glucose.: 102 mg/dL (13 Oct 2019 16:45)  POCT Blood Glucose.: 133 mg/dL (13 Oct 2019 11:38)  POCT Blood Glucose.: 107 mg/dL (13 Oct 2019 08:03)  POCT Blood Glucose.: 90 mg/dL (12 Oct 2019 21:35)

## 2019-10-13 NOTE — PROGRESS NOTE ADULT - ASSESSMENT
1. Epididymitis:  Cont Ceftriaxone; afebrile, no leukocytosis  Clinically improved  Monitor now     2. UTI  Cont Ceftriaxone   Urine cx growing GNR    3. Dizziness: Possibly due to orthostatic hypotension from dehydration, in the setting of UTI  Resolved   CTA head and neck and MRI of brain Neg  Carotid doppler neg for stenosis  TTE noted: severe LA dilatation, normal EF  Cont Meclizine PRN  PT eval noted      4. TRACI due to dehydration   Improved with IVF  US renal and bladder noted, no hydronephrosis    5. Anemia and thrombocytopenia  Possibly due to Vitamin B12 deficiency ( level in low normal side)  Pending MMA level  TSH normal     6. BPH:   Cont Tamsulosin     7. HTN:  Hold Losartan- Restart when clinically appropriate  Cont Diltiazem     8. Prophylaxis:  Diet: Full  Lovenox for DVT prophylaxis  Fall precaution  PT eval  Disposition: Home

## 2019-10-13 NOTE — DIETITIAN INITIAL EVALUATION ADULT. - PERTINENT LABORATORY DATA
10-11 Na139 mmol/L Glu 97 mg/dL K+ 4.2 mmol/L Cr  1.27 mg/dL BUN 23 mg/dL<H> 10-11 Phos 2.6 mg/dL 10-11 Alb 2.7 g/dL<L> 10-11 PAB 10 mg/dL<L> 10-11 [id number] 5.3 %

## 2019-10-14 ENCOUNTER — TRANSCRIPTION ENCOUNTER (OUTPATIENT)
Age: 84
End: 2019-10-14

## 2019-10-14 VITALS
OXYGEN SATURATION: 96 % | RESPIRATION RATE: 18 BRPM | HEART RATE: 77 BPM | SYSTOLIC BLOOD PRESSURE: 129 MMHG | DIASTOLIC BLOOD PRESSURE: 66 MMHG | TEMPERATURE: 99 F

## 2019-10-14 LAB
-  AMIKACIN: SIGNIFICANT CHANGE UP
-  AMPICILLIN/SULBACTAM: SIGNIFICANT CHANGE UP
-  AMPICILLIN: SIGNIFICANT CHANGE UP
-  AZTREONAM: SIGNIFICANT CHANGE UP
-  CEFAZOLIN: SIGNIFICANT CHANGE UP
-  CEFEPIME: SIGNIFICANT CHANGE UP
-  CEFOXITIN: SIGNIFICANT CHANGE UP
-  CEFTRIAXONE: SIGNIFICANT CHANGE UP
-  CIPROFLOXACIN: SIGNIFICANT CHANGE UP
-  GENTAMICIN: SIGNIFICANT CHANGE UP
-  IMIPENEM: SIGNIFICANT CHANGE UP
-  LEVOFLOXACIN: SIGNIFICANT CHANGE UP
-  MEROPENEM: SIGNIFICANT CHANGE UP
-  NITROFURANTOIN: SIGNIFICANT CHANGE UP
-  PIPERACILLIN/TAZOBACTAM: SIGNIFICANT CHANGE UP
-  TIGECYCLINE: SIGNIFICANT CHANGE UP
-  TOBRAMYCIN: SIGNIFICANT CHANGE UP
-  TRIMETHOPRIM/SULFAMETHOXAZOLE: SIGNIFICANT CHANGE UP
C TRACH RRNA SPEC QL NAA+PROBE: SIGNIFICANT CHANGE UP
CULTURE RESULTS: SIGNIFICANT CHANGE UP
GLUCOSE BLDC GLUCOMTR-MCNC: 104 MG/DL — HIGH (ref 70–99)
GLUCOSE BLDC GLUCOMTR-MCNC: 93 MG/DL — SIGNIFICANT CHANGE UP (ref 70–99)
HCT VFR BLD CALC: 35.5 % — LOW (ref 39–50)
HGB BLD-MCNC: 11.5 G/DL — LOW (ref 13–17)
MCHC RBC-ENTMCNC: 32.4 GM/DL — SIGNIFICANT CHANGE UP (ref 32–36)
MCHC RBC-ENTMCNC: 32.7 PG — SIGNIFICANT CHANGE UP (ref 27–34)
MCV RBC AUTO: 100.9 FL — HIGH (ref 80–100)
METHOD TYPE: SIGNIFICANT CHANGE UP
N GONORRHOEA RRNA SPEC QL NAA+PROBE: SIGNIFICANT CHANGE UP
NRBC # BLD: 0 /100 WBCS — SIGNIFICANT CHANGE UP (ref 0–0)
ORGANISM # SPEC MICROSCOPIC CNT: SIGNIFICANT CHANGE UP
ORGANISM # SPEC MICROSCOPIC CNT: SIGNIFICANT CHANGE UP
PLATELET # BLD AUTO: 177 K/UL — SIGNIFICANT CHANGE UP (ref 150–400)
RBC # BLD: 3.52 M/UL — LOW (ref 4.2–5.8)
RBC # FLD: 13.1 % — SIGNIFICANT CHANGE UP (ref 10.3–14.5)
SPECIMEN SOURCE: SIGNIFICANT CHANGE UP
SPECIMEN SOURCE: SIGNIFICANT CHANGE UP
WBC # BLD: 8.18 K/UL — SIGNIFICANT CHANGE UP (ref 3.8–10.5)
WBC # FLD AUTO: 8.18 K/UL — SIGNIFICANT CHANGE UP (ref 3.8–10.5)

## 2019-10-14 PROCEDURE — 99233 SBSQ HOSP IP/OBS HIGH 50: CPT | Mod: GC

## 2019-10-14 RX ORDER — MECLIZINE HCL 12.5 MG
1 TABLET ORAL
Qty: 21 | Refills: 0
Start: 2019-10-14 | End: 2019-10-20

## 2019-10-14 RX ORDER — LIDOCAINE 4 G/100G
1 CREAM TOPICAL ONCE
Refills: 0 | Status: COMPLETED | OUTPATIENT
Start: 2019-10-14 | End: 2019-10-14

## 2019-10-14 RX ORDER — CEFUROXIME AXETIL 250 MG
1 TABLET ORAL
Qty: 20 | Refills: 0
Start: 2019-10-14 | End: 2019-10-23

## 2019-10-14 RX ADMIN — LIDOCAINE 1 PATCH: 4 CREAM TOPICAL at 05:31

## 2019-10-14 RX ADMIN — Medication 100 MICROGRAM(S): at 05:33

## 2019-10-14 RX ADMIN — Medication 180 MILLIGRAM(S): at 05:33

## 2019-10-14 RX ADMIN — LIDOCAINE 1 PATCH: 4 CREAM TOPICAL at 07:03

## 2019-10-14 RX ADMIN — ZOLPIDEM TARTRATE 5 MILLIGRAM(S): 10 TABLET ORAL at 00:44

## 2019-10-14 RX ADMIN — CEFTRIAXONE 100 MILLIGRAM(S): 500 INJECTION, POWDER, FOR SOLUTION INTRAMUSCULAR; INTRAVENOUS at 13:49

## 2019-10-14 NOTE — DISCHARGE NOTE PROVIDER - HOSPITAL COURSE
84M from home,  lives alone, ambulates with cane with PMHx chronic back pain, BPH, who presented to ED with dizziness since 3 days of room spinning sensation and difficulty ambulating  due to feeling off balance. Admitted for vertigo workup. Neurological and cardiac workup were negative/unremarkable. Diagnosed with pansensitive E coli epididymitis/UTI, treated w/ CTX 1g x 4 days. Lightheadedness likely in setting of UTI/dehydration. Patient will be discharged home on PRN meclizine and will continue with oral antibiotic regimen to complete a total of 14 day antibiotic. 84M from home,  lives alone, ambulates with cane with PMHx chronic back pain, BPH, who presented to ED with dizziness since 3 days of room spinning sensation and difficulty ambulating  due to feeling off balance. Admitted for vertigo workup. Neurological and cardiac workup were negative/unremarkable. Diagnosed with pansensitive E coli epididymitis/UTI, treated w/ CTX 1g x 4 days. Lightheadedness likely in setting of UTI/dehydration. Patient will be discharged home on PRN meclizine and will continue with oral antibiotic regimen: Doxycycline + Ceftin, to complete a total of 14 day antibiotic. 84M from home,  lives alone, ambulates with cane with PMHx chronic back pain, BPH, who presented to ED with dizziness since 3 days of room spinning sensation and difficulty ambulating  due to feeling off balance. Admitted for vertigo workup. Neurological and cardiac workup were negative/unremarkable. Diagnosed with pansensitive E coli epididymitis/UTI, treated w/ CTX 1g x 4 days. Lightheadedness likely in setting of UTI/dehydration, as patient initially with +orthostatics, which resolved with IVF. Patient will be discharged home on PRN meclizine and will continue with oral antibiotic regimen: Doxycycline + Ceftin, to complete a total of 14 day antibiotic. 84M from home,  lives alone, ambulates with cane with PMHx chronic back pain, BPH, who presented to ED with dizziness since 3 days of room spinning sensation and difficulty ambulating  due to feeling off balance. Admitted for vertigo workup. Neurological and cardiac workup were negative/unremarkable. Diagnosed with pansensitive E coli epididymitis/UTI, treated w/ CTX 1g x 4 days. Lightheadedness likely in setting of UTI/dehydration, as patient initially with +orthostatics, which resolved with IVF. Patient will be discharged home on PRN meclizine and will continue with oral antibiotic regimen: Doxycycline + Ceftin, to complete a total of 14 day antibiotic. PT evaluated patient and recommended no skilled PT required.

## 2019-10-14 NOTE — DISCHARGE NOTE PROVIDER - NSDCCPCAREPLAN_GEN_ALL_CORE_FT
PRINCIPAL DISCHARGE DIAGNOSIS  Diagnosis: Left epididymitis  Assessment and Plan of Treatment: Urine culture was positive to E. coli, you were treated with Ceftriaxone IV for 4 days with good response. You will continue with Ceftin 250 mg twice a day and Doxycycline 100 mg twice a day for ten more days for a total of 14 day antiobiotic course. Please follow up with your primary care doctor within a week of discharge to inform about your hospitalization and for close follow up.      SECONDARY DISCHARGE DIAGNOSES  Diagnosis: Dizziness  Assessment and Plan of Treatment: Your neurological and cardiac workup were negative, including echocardiogram, CT angiogram of the brain and MRI. Your symptoms were most likely due to dehydration in setting of infection. Continue with Meclizine as needed for vertigo. Follow up with primary care doctor upon discharge.    Diagnosis: HTN (hypertension)  Assessment and Plan of Treatment: Continue with blood pressure medication: Diltiazem only. Your blood pressure was well controlled without Losartan. Hence, you will only  continue with Diltiazem for now and follow up with your primary care doctor within a week of discharge to check your blood pressure and make the necessary adjustments of your antihypertensive regimen. Maintain a healthy diet that consist of low sugar, low fat, low sodium diet. Exercise frequently if possible. PRINCIPAL DISCHARGE DIAGNOSIS  Diagnosis: Left epididymitis  Assessment and Plan of Treatment: Urine culture was positive to E. coli, you were treated with Ceftriaxone IV for 4 days with good response. You will continue with Ceftin 250 mg twice a day and Doxycycline 100 mg twice a day for ten more days for a total of 14 day antiobiotic course. Please follow up with your primary care doctor within a week of discharge to inform about your hospitalization and for close follow up.      SECONDARY DISCHARGE DIAGNOSES  Diagnosis: HTN (hypertension)  Assessment and Plan of Treatment: Continue with blood pressure medication: Diltiazem only. Your blood pressure was well controlled without Losartan. Hence, you will only  continue with Diltiazem for now and follow up with your primary care doctor within a week of discharge to check your blood pressure and make the necessary adjustments of your antihypertensive regimen. Maintain a healthy diet that consist of low sugar, low fat, low sodium diet.       Diagnosis: Dizziness  Assessment and Plan of Treatment: Your neurological and cardiac workup were negative, including echocardiogram, CT angiogram of the brain and MRI. Your symptoms were most likely due to dehydration in setting of infection. Continue with Meclizine as needed for vertigo. Follow up with primary care doctor upon discharge.

## 2019-10-14 NOTE — DISCHARGE NOTE PROVIDER - NSDCPNSUBOBJ_GEN_ALL_CORE
Patient is a 84y old  Male who presents with a chief complaint of Dizziness (14 Oct 2019 14:05)    Found to have UTI and epididymitis         Today:    Patient was seen and examined at bedside    Denies any complains     Testicular erythema subsided, no pain         Vitals stable         REVIEW OF SYSTEMS: denies fever, chills, SOB, palpitations, chest pain, abdominal pain        PHYSICAL EXAM:    GENERAL: NAD, well-groomed, well-developed    NERVOUS SYSTEM:  Alert & Oriented X3, Good concentration; Motor Strength 5/5 B/L upper and lower extremities; gait normal     CHEST/LUNG: Clear to percussion bilaterally; No rales, rhonchi, wheezing, or rubs    HEART: Regular rate and rhythm; No murmurs, rubs, or gallops    ABDOMEN: Soft, Nontender, Nondistended; Bowel sounds present    Scrotum: No swelling and or erythema, non tender         Assessment and plan:    1. Epididymitis and UTI:     Urine cx growing pan sensitive E.coli    Will discharge on Ceftin and Doxycycline for 10 more days         3. Dizziness: Possibly due to orthostatic hypotension from dehydration, in the setting of UTI    Resolved     Work up neg            4. TRACI due to dehydration     Resolved         5. Anemia and thrombocytopenia    Possibly due to Vitamin B12 deficiency ( level in low normal side)    Pending MMA level    TSH normal         6. BPH:     Cont Tamsulosin         7. HTN:    Hold Losartan- Restart outpatient     Cont Diltiazem         8. Prophylaxis:    Diet: Full    Fall precaution    PT eval appreciated     Disposition: Home

## 2019-10-14 NOTE — DISCHARGE NOTE NURSING/CASE MANAGEMENT/SOCIAL WORK - PATIENT PORTAL LINK FT
You can access the FollowMyHealth Patient Portal offered by [facility] by registering at the following website: [email or web address]. By joining [email or web address]’s FollowMyHealth portal, you will also be able to view your health information using other applications (apps) compatible with our system.

## 2019-10-16 LAB — METHYLMALONATE SERPL-SCNC: 258 NMOL/L — SIGNIFICANT CHANGE UP (ref 0–378)

## 2019-11-12 PROCEDURE — 70498 CT ANGIOGRAPHY NECK: CPT

## 2019-11-12 PROCEDURE — 84443 ASSAY THYROID STIM HORMONE: CPT

## 2019-11-12 PROCEDURE — 85610 PROTHROMBIN TIME: CPT

## 2019-11-12 PROCEDURE — 84156 ASSAY OF PROTEIN URINE: CPT

## 2019-11-12 PROCEDURE — 85730 THROMBOPLASTIN TIME PARTIAL: CPT

## 2019-11-12 PROCEDURE — 36415 COLL VENOUS BLD VENIPUNCTURE: CPT

## 2019-11-12 PROCEDURE — 71045 X-RAY EXAM CHEST 1 VIEW: CPT

## 2019-11-12 PROCEDURE — 76870 US EXAM SCROTUM: CPT

## 2019-11-12 PROCEDURE — 87186 SC STD MICRODIL/AGAR DIL: CPT

## 2019-11-12 PROCEDURE — 93005 ELECTROCARDIOGRAM TRACING: CPT

## 2019-11-12 PROCEDURE — 83550 IRON BINDING TEST: CPT

## 2019-11-12 PROCEDURE — 70450 CT HEAD/BRAIN W/O DYE: CPT

## 2019-11-12 PROCEDURE — 86850 RBC ANTIBODY SCREEN: CPT

## 2019-11-12 PROCEDURE — 83615 LACTATE (LD) (LDH) ENZYME: CPT

## 2019-11-12 PROCEDURE — 87086 URINE CULTURE/COLONY COUNT: CPT

## 2019-11-12 PROCEDURE — 84134 ASSAY OF PREALBUMIN: CPT

## 2019-11-12 PROCEDURE — 85045 AUTOMATED RETICULOCYTE COUNT: CPT

## 2019-11-12 PROCEDURE — 76770 US EXAM ABDO BACK WALL COMP: CPT

## 2019-11-12 PROCEDURE — 99285 EMERGENCY DEPT VISIT HI MDM: CPT | Mod: 25

## 2019-11-12 PROCEDURE — 82248 BILIRUBIN DIRECT: CPT

## 2019-11-12 PROCEDURE — 83036 HEMOGLOBIN GLYCOSYLATED A1C: CPT

## 2019-11-12 PROCEDURE — 86900 BLOOD TYPING SEROLOGIC ABO: CPT

## 2019-11-12 PROCEDURE — 80053 COMPREHEN METABOLIC PANEL: CPT

## 2019-11-12 PROCEDURE — 76775 US EXAM ABDO BACK WALL LIM: CPT

## 2019-11-12 PROCEDURE — 83540 ASSAY OF IRON: CPT

## 2019-11-12 PROCEDURE — 82570 ASSAY OF URINE CREATININE: CPT

## 2019-11-12 PROCEDURE — 82436 ASSAY OF URINE CHLORIDE: CPT

## 2019-11-12 PROCEDURE — 85027 COMPLETE CBC AUTOMATED: CPT

## 2019-11-12 PROCEDURE — 87591 N.GONORRHOEAE DNA AMP PROB: CPT

## 2019-11-12 PROCEDURE — 86901 BLOOD TYPING SEROLOGIC RH(D): CPT

## 2019-11-12 PROCEDURE — 87389 HIV-1 AG W/HIV-1&-2 AB AG IA: CPT

## 2019-11-12 PROCEDURE — 70551 MRI BRAIN STEM W/O DYE: CPT

## 2019-11-12 PROCEDURE — 82728 ASSAY OF FERRITIN: CPT

## 2019-11-12 PROCEDURE — 83921 ORGANIC ACID SINGLE QUANT: CPT

## 2019-11-12 PROCEDURE — 83010 ASSAY OF HAPTOGLOBIN QUANT: CPT

## 2019-11-12 PROCEDURE — 83735 ASSAY OF MAGNESIUM: CPT

## 2019-11-12 PROCEDURE — 84484 ASSAY OF TROPONIN QUANT: CPT

## 2019-11-12 PROCEDURE — 76857 US EXAM PELVIC LIMITED: CPT

## 2019-11-12 PROCEDURE — 82746 ASSAY OF FOLIC ACID SERUM: CPT

## 2019-11-12 PROCEDURE — 70496 CT ANGIOGRAPHY HEAD: CPT

## 2019-11-12 PROCEDURE — 84300 ASSAY OF URINE SODIUM: CPT

## 2019-11-12 PROCEDURE — 84100 ASSAY OF PHOSPHORUS: CPT

## 2019-11-12 PROCEDURE — 84133 ASSAY OF URINE POTASSIUM: CPT

## 2019-11-12 PROCEDURE — 87491 CHLMYD TRACH DNA AMP PROBE: CPT

## 2019-11-12 PROCEDURE — 80074 ACUTE HEPATITIS PANEL: CPT

## 2019-11-12 PROCEDURE — 84466 ASSAY OF TRANSFERRIN: CPT

## 2019-11-12 PROCEDURE — 93880 EXTRACRANIAL BILAT STUDY: CPT

## 2019-11-12 PROCEDURE — 81001 URINALYSIS AUTO W/SCOPE: CPT

## 2019-11-12 PROCEDURE — 82607 VITAMIN B-12: CPT

## 2019-11-12 PROCEDURE — 82306 VITAMIN D 25 HYDROXY: CPT

## 2019-11-12 PROCEDURE — 83930 ASSAY OF BLOOD OSMOLALITY: CPT

## 2019-11-12 PROCEDURE — 82553 CREATINE MB FRACTION: CPT

## 2019-11-12 PROCEDURE — 82550 ASSAY OF CK (CPK): CPT

## 2019-11-12 PROCEDURE — 82962 GLUCOSE BLOOD TEST: CPT

## 2019-11-12 PROCEDURE — 83935 ASSAY OF URINE OSMOLALITY: CPT

## 2019-11-12 PROCEDURE — 93306 TTE W/DOPPLER COMPLETE: CPT

## 2020-02-17 NOTE — ASU PREOP CHECKLIST - SIDE RAILS UP
"Subjective:   Deandre Barr is a 59 y.o. male here today for evaluation and management of:     Irritable bowel syndrome with diarrhea  10 yr hx   Multiple studies done in [place]  Records requested.   Only steroids even low dose for 2-3 days provided sig relief.   Egd, colonos done multiple times  On dexilant needs refills  hemorr cream refilled  Prednisone 5 mg once a day x 3 days provided for flare up.     History of hepatitis C  Successful treatment with Harvoni 3 years ago.     Cigarette nicotine dependence without complication  Declined chantix, wellbutrin.   Always had night sweats done.   Lung nodule was seen and was seen by a cancer specialist and it was stable.          Current medicines (including changes today)  Current Outpatient Medications   Medication Sig Dispense Refill   • DEXILANT 60 MG CAPSULE DELAYED RELEASE delayed-release capsule Take 1 Cap by mouth every day. 90 Cap 3   • hydrocortisone-pramoxine (ANALPRAM-HC) 2.5-1 % rectal cream Insert 1 Applicator in rectum 3 times a day as needed. 1 Tube 5   • predniSONE (DELTASONE) 5 MG Tab Take 1 Tab by mouth every day. 3 Tab 0   • ibuprofen (MOTRIN) 800 MG Tab ibuprofen 800 mg tablet   TAKE 1 TABLET BY MOUTH TWICE A DAY       No current facility-administered medications for this visit.      He  has no past medical history on file.    ROS  No chest pain, no shortness of breath, no abdominal pain       Objective:     /76   Pulse 88   Temp 37.1 °C (98.8 °F) (Temporal)   Resp 16   Ht 1.753 m (5' 9\")   Wt 70.8 kg (156 lb)   SpO2 98%  Body mass index is 23.04 kg/m².   Physical Exam:  Constitutional: Alert, no distress.  Skin: Warm, dry, good turgor, no rashes in visible areas.  Eye: Equal, round and reactive, conjunctiva clear, lids normal.  ENMT: Lips without lesions, good dentition, oropharynx clear.  Neck: Trachea midline, no masses, no thyromegaly. No cervical or supraclavicular lymphadenopathy  Respiratory: Unlabored respiratory effort, " lungs clear to auscultation, no wheezes, no ronchi.  Cardiovascular: Normal S1, S2, no murmur, no edema.  Abdomen: Soft, non-tender, no masses, no hepatosplenomegaly.  Psych: Alert and oriented x3, normal affect and mood.        Assessment and Plan:   The following treatment plan was discussed    1. Screening for colorectal cancer  - REFERRAL TO GI FOR COLONOSCOPY    2. Irritable bowel syndrome with diarrhea  - DEXILANT 60 MG CAPSULE DELAYED RELEASE delayed-release capsule; Take 1 Cap by mouth every day.  Dispense: 90 Cap; Refill: 3  - predniSONE (DELTASONE) 5 MG Tab; Take 1 Tab by mouth every day.  Dispense: 3 Tab; Refill: 0  - CBC WITH DIFFERENTIAL; Future  - Comp Metabolic Panel; Future    3. H/O gastroesophageal reflux (GERD)  - DEXILANT 60 MG CAPSULE DELAYED RELEASE delayed-release capsule; Take 1 Cap by mouth every day.  Dispense: 90 Cap; Refill: 3    4. Medication refill  - hydrocortisone-pramoxine (ANALPRAM-HC) 2.5-1 % rectal cream; Insert 1 Applicator in rectum 3 times a day as needed.  Dispense: 1 Tube; Refill: 5    5. Hemorrhoids, unspecified hemorrhoid type  - hydrocortisone-pramoxine (ANALPRAM-HC) 2.5-1 % rectal cream; Insert 1 Applicator in rectum 3 times a day as needed.  Dispense: 1 Tube; Refill: 5    6. Functional diarrhea  - CBC WITH DIFFERENTIAL; Future  - Comp Metabolic Panel; Future    7. History of hepatitis C  Completed treatment.     8. Cigarette nicotine dependence without complication  - REFERRAL TO LUNG CANCER SCREENING PROGRAM      Followup: Return in about 6 months (around 8/13/2020) for change PCP to Home, ROLF, smoking cessation, .          done

## 2020-05-26 ENCOUNTER — APPOINTMENT (OUTPATIENT)
Dept: OTOLARYNGOLOGY | Facility: CLINIC | Age: 85
End: 2020-05-26
Payer: MEDICARE

## 2020-05-26 VITALS — TEMPERATURE: 98.6 F

## 2020-05-26 PROCEDURE — 99204 OFFICE O/P NEW MOD 45 MIN: CPT | Mod: 25

## 2020-05-26 PROCEDURE — 31575 DIAGNOSTIC LARYNGOSCOPY: CPT

## 2020-05-26 NOTE — HISTORY OF PRESENT ILLNESS
[de-identified] : This is a patient referred by Dr. Naidu for thyroid nodule. This was found a few months ago during physical exam/US.\par No dysphagia or dyspnea. pt c/o hoarseness. He was  found to have thyroid nodules and referred to Dr Naidu. \par ct of chest  4/2020 showed new hypodense enlarge thyroid extend to the superior mediastinum. 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

## 2020-05-26 NOTE — PHYSICAL EXAM
[de-identified] : Diffusely enlarged thyroid [Midline] : trachea located in midline position [Normal] : no rashes

## 2020-05-26 NOTE — CONSULT LETTER
[Dear  ___] : Dear  [unfilled], [Consult Letter:] : I had the pleasure of evaluating your patient, [unfilled]. [Please see my note below.] : Please see my note below. [Consult Closing:] : Thank you very much for allowing me to participate in the care of this patient.  If you have any questions, please do not hesitate to contact me. [Sincerely,] : Sincerely, [FreeTextEntry2] : Dr Sammy Naidu [FreeTextEntry3] : \par Ramos Wisdom MD, FACS\par \par Otolaryngology-Head and Neck Surgery\par [employer] [facility]\par

## 2020-06-23 NOTE — ED PROVIDER NOTE - SKIN, MLM
Detail Level: Detailed Depth Of Biopsy: dermis Was A Bandage Applied: Yes Size Of Lesion In Cm: 0.4 X Size Of Lesion In Cm: 0 Biopsy Type: H and E Biopsy Method: 15 blade Anesthesia Type: 1% lidocaine with epinephrine Anesthesia Volume In Cc (Will Not Render If 0): 0.5 Hemostasis: Drysol Wound Care: Vaseline Dressing: bandage Type Of Destruction Used: Electrodesiccation Curettage Text: The wound bed was treated with curettage after the biopsy was performed. Cryotherapy Text: The wound bed was treated with cryotherapy after the biopsy was performed. Electrodesiccation Text: The wound bed was treated with electrodesiccation after the biopsy was performed. Electrodesiccation And Curettage Text: The wound bed was treated with electrodesiccation and curettage after the biopsy was performed. Silver Nitrate Text: The wound bed was treated with silver nitrate after the biopsy was performed. Lab: [id number] Lab Facility: [id number] Notes In Note?: No Consent: Verbal consent was obtained and risks were reviewed including but not limited to scarring, infection, bleeding, scabbing, incomplete removal, nerve damage and allergy to anesthesia. Post-Care Instructions: I reviewed with the patient in detail post-care instructions. Patient is to keep the biopsy site dry overnight, and then apply bacitracin twice daily until healed. Patient may apply hydrogen peroxide soaks to remove any crusting. Notification Instructions: Patient will be notified of biopsy results. However, patient instructed to call the office if not contacted within 2 weeks. Billing Type: Third-Party Bill Information: Selecting Yes will display possible errors in your note based on the variables you have selected. This validation is only offered as a suggestion for you. PLEASE NOTE THAT THE VALIDATION TEXT WILL BE REMOVED WHEN YOU FINALIZE YOUR NOTE. IF YOU WANT TO FAX A PRELIMINARY NOTE YOU WILL NEED TO TOGGLE THIS TO 'NO' IF YOU DO NOT WANT IT IN YOUR FAXED NOTE. Skin normal color for race, warm, dry and intact. No evidence of rash.

## 2020-07-27 ENCOUNTER — EMERGENCY (EMERGENCY)
Facility: HOSPITAL | Age: 85
LOS: 1 days | Discharge: HOME | End: 2020-07-27
Attending: STUDENT IN AN ORGANIZED HEALTH CARE EDUCATION/TRAINING PROGRAM
Payer: COMMERCIAL

## 2020-07-27 VITALS
DIASTOLIC BLOOD PRESSURE: 75 MMHG | SYSTOLIC BLOOD PRESSURE: 145 MMHG | RESPIRATION RATE: 24 BRPM | WEIGHT: 185.19 LBS | OXYGEN SATURATION: 98 % | TEMPERATURE: 97 F | HEART RATE: 109 BPM

## 2020-07-27 VITALS
OXYGEN SATURATION: 100 % | TEMPERATURE: 98 F | DIASTOLIC BLOOD PRESSURE: 69 MMHG | RESPIRATION RATE: 19 BRPM | SYSTOLIC BLOOD PRESSURE: 132 MMHG | HEART RATE: 86 BPM

## 2020-07-27 DIAGNOSIS — M95.0 ACQUIRED DEFORMITY OF NOSE: Chronic | ICD-10-CM

## 2020-07-27 DIAGNOSIS — Z98.890 OTHER SPECIFIED POSTPROCEDURAL STATES: Chronic | ICD-10-CM

## 2020-07-27 DIAGNOSIS — Z90.49 ACQUIRED ABSENCE OF OTHER SPECIFIED PARTS OF DIGESTIVE TRACT: Chronic | ICD-10-CM

## 2020-07-27 LAB
ALBUMIN SERPL ELPH-MCNC: 3.3 G/DL — LOW (ref 3.5–5)
ALP SERPL-CCNC: 67 U/L — SIGNIFICANT CHANGE UP (ref 40–120)
ALT FLD-CCNC: 16 U/L DA — SIGNIFICANT CHANGE UP (ref 10–60)
ANION GAP SERPL CALC-SCNC: 6 MMOL/L — SIGNIFICANT CHANGE UP (ref 5–17)
AST SERPL-CCNC: 12 U/L — SIGNIFICANT CHANGE UP (ref 10–40)
BASOPHILS # BLD AUTO: 0.12 K/UL — SIGNIFICANT CHANGE UP (ref 0–0.2)
BASOPHILS NFR BLD AUTO: 1.4 % — SIGNIFICANT CHANGE UP (ref 0–2)
BILIRUB SERPL-MCNC: 0.5 MG/DL — SIGNIFICANT CHANGE UP (ref 0.2–1.2)
BUN SERPL-MCNC: 14 MG/DL — SIGNIFICANT CHANGE UP (ref 7–18)
CALCIUM SERPL-MCNC: 8.8 MG/DL — SIGNIFICANT CHANGE UP (ref 8.4–10.5)
CHLORIDE SERPL-SCNC: 104 MMOL/L — SIGNIFICANT CHANGE UP (ref 96–108)
CO2 SERPL-SCNC: 29 MMOL/L — SIGNIFICANT CHANGE UP (ref 22–31)
CREAT SERPL-MCNC: 1.13 MG/DL — SIGNIFICANT CHANGE UP (ref 0.5–1.3)
EOSINOPHIL # BLD AUTO: 0.21 K/UL — SIGNIFICANT CHANGE UP (ref 0–0.5)
EOSINOPHIL NFR BLD AUTO: 2.5 % — SIGNIFICANT CHANGE UP (ref 0–6)
GLUCOSE SERPL-MCNC: 121 MG/DL — HIGH (ref 70–99)
HCT VFR BLD CALC: 39.8 % — SIGNIFICANT CHANGE UP (ref 39–50)
HGB BLD-MCNC: 13.2 G/DL — SIGNIFICANT CHANGE UP (ref 13–17)
IMM GRANULOCYTES NFR BLD AUTO: 0.5 % — SIGNIFICANT CHANGE UP (ref 0–1.5)
LIDOCAIN IGE QN: 128 U/L — SIGNIFICANT CHANGE UP (ref 73–393)
LYMPHOCYTES # BLD AUTO: 1.15 K/UL — SIGNIFICANT CHANGE UP (ref 1–3.3)
LYMPHOCYTES # BLD AUTO: 13.7 % — SIGNIFICANT CHANGE UP (ref 13–44)
MAGNESIUM SERPL-MCNC: 2.1 MG/DL — SIGNIFICANT CHANGE UP (ref 1.6–2.6)
MCHC RBC-ENTMCNC: 33.2 GM/DL — SIGNIFICANT CHANGE UP (ref 32–36)
MCHC RBC-ENTMCNC: 33.2 PG — SIGNIFICANT CHANGE UP (ref 27–34)
MCV RBC AUTO: 100.3 FL — HIGH (ref 80–100)
MONOCYTES # BLD AUTO: 0.6 K/UL — SIGNIFICANT CHANGE UP (ref 0–0.9)
MONOCYTES NFR BLD AUTO: 7.1 % — SIGNIFICANT CHANGE UP (ref 2–14)
NEUTROPHILS # BLD AUTO: 6.28 K/UL — SIGNIFICANT CHANGE UP (ref 1.8–7.4)
NEUTROPHILS NFR BLD AUTO: 74.8 % — SIGNIFICANT CHANGE UP (ref 43–77)
NRBC # BLD: 0 /100 WBCS — SIGNIFICANT CHANGE UP (ref 0–0)
PLATELET # BLD AUTO: 169 K/UL — SIGNIFICANT CHANGE UP (ref 150–400)
POTASSIUM SERPL-MCNC: 3.5 MMOL/L — SIGNIFICANT CHANGE UP (ref 3.5–5.3)
POTASSIUM SERPL-SCNC: 3.5 MMOL/L — SIGNIFICANT CHANGE UP (ref 3.5–5.3)
PROT SERPL-MCNC: 6.7 G/DL — SIGNIFICANT CHANGE UP (ref 6–8.3)
RBC # BLD: 3.97 M/UL — LOW (ref 4.2–5.8)
RBC # FLD: 12.4 % — SIGNIFICANT CHANGE UP (ref 10.3–14.5)
SODIUM SERPL-SCNC: 139 MMOL/L — SIGNIFICANT CHANGE UP (ref 135–145)
WBC # BLD: 8.4 K/UL — SIGNIFICANT CHANGE UP (ref 3.8–10.5)
WBC # FLD AUTO: 8.4 K/UL — SIGNIFICANT CHANGE UP (ref 3.8–10.5)

## 2020-07-27 PROCEDURE — 93005 ELECTROCARDIOGRAM TRACING: CPT

## 2020-07-27 PROCEDURE — 83735 ASSAY OF MAGNESIUM: CPT

## 2020-07-27 PROCEDURE — 74177 CT ABD & PELVIS W/CONTRAST: CPT

## 2020-07-27 PROCEDURE — 99284 EMERGENCY DEPT VISIT MOD MDM: CPT

## 2020-07-27 PROCEDURE — 80053 COMPREHEN METABOLIC PANEL: CPT

## 2020-07-27 PROCEDURE — 83690 ASSAY OF LIPASE: CPT

## 2020-07-27 PROCEDURE — 85027 COMPLETE CBC AUTOMATED: CPT

## 2020-07-27 PROCEDURE — 36415 COLL VENOUS BLD VENIPUNCTURE: CPT

## 2020-07-27 PROCEDURE — 74177 CT ABD & PELVIS W/CONTRAST: CPT | Mod: 26

## 2020-07-27 PROCEDURE — 96374 THER/PROPH/DIAG INJ IV PUSH: CPT | Mod: XU

## 2020-07-27 PROCEDURE — 99284 EMERGENCY DEPT VISIT MOD MDM: CPT | Mod: 25

## 2020-07-27 RX ORDER — ACETAMINOPHEN 500 MG
975 TABLET ORAL ONCE
Refills: 0 | Status: COMPLETED | OUTPATIENT
Start: 2020-07-27 | End: 2020-07-27

## 2020-07-27 RX ORDER — FAMOTIDINE 10 MG/ML
20 INJECTION INTRAVENOUS ONCE
Refills: 0 | Status: COMPLETED | OUTPATIENT
Start: 2020-07-27 | End: 2020-07-27

## 2020-07-27 RX ADMIN — Medication 975 MILLIGRAM(S): at 14:24

## 2020-07-27 RX ADMIN — FAMOTIDINE 20 MILLIGRAM(S): 10 INJECTION INTRAVENOUS at 10:32

## 2020-07-27 RX ADMIN — Medication 975 MILLIGRAM(S): at 10:32

## 2020-07-27 RX ADMIN — Medication 30 MILLILITER(S): at 10:32

## 2020-07-27 NOTE — ED PROVIDER NOTE - NSFOLLOWUPINSTRUCTIONS_ED_ALL_ED_FT
You were seen in the emergency department for abdominal pain and back and leg pain.     Please follow-up with your primary care doctor in the next 24-48 hours.     Please follow-up with your primary care doctor in 4 months to have the rectal mass re-evaluated.     If you have any worsening symptoms, severe abdominal pain, nausea vomiting, or you have difficulty having a bowel movement or have difficulty urinating please return to the emergency department.

## 2020-07-27 NOTE — ED PROVIDER NOTE - PHYSICAL EXAMINATION
General: well appearing male, no acute distress   HEENT: normocephalic, atraumatic   Respiratory: normal work of breathing, lungs clear to auscultation bilaterally   Cardiac: regular rate and rhythm   Abdomen: soft, non-tender, no guarding or rebound   MSK: no swelling or tenderness of lower extremities, moving all extremities spontaneously, no midline spinal tenderness to palpation   : cystic structure palpated at 5 o'clock, no tender, no bleeding   Skin: warm, dry   Neuro: A&Ox3  Psych: appropriate affect

## 2020-07-27 NOTE — ED PROVIDER NOTE - OBJECTIVE STATEMENT
84M, pmh of htn, hypothyroidism, presenting with abdominal pain. patient reports several weeks of intermittent abdominal pian. pain typically worse after eating. no nasuea, vomiting, constipation or diarrhea. also reports lower back and bilateral leg pain when he wakes up. walks with a cane without difficulty. last week he felt a rectal cyst. no pain or bleeding. no fever, chest pain, shortness of breathing, pain or burning with urination, swelling of lower extremities.

## 2020-07-27 NOTE — ED PROVIDER NOTE - PROGRESS NOTE DETAILS
updated patient on results. instructed patient to follow-up with pcp in 4 months to reassess rectal mass. patient expressed understanding. Yong Hickey

## 2020-07-27 NOTE — ED PROVIDER NOTE - PMH
Appropriate antibiotics given
BPH (benign prostatic hypertrophy)    Chronic back pain    Herniated disc    Histoplasmosis    Hypertension    Hypothyroid    Insomnia    Other pulmonary embolism without acute cor pulmonale, unspecified chronicity  off Xarelto x 2 years  Renal cysts, acquired, bilateral    Varicose veins

## 2020-07-27 NOTE — ED ADULT NURSE NOTE - OBJECTIVE STATEMENT
Patient came to the ED a/o x 3 ambulates c/o abdominal pain. Patient stated he has a "cyst" in his rectal area. Denies any bleeding in the rectum.

## 2020-07-27 NOTE — ED PROVIDER NOTE - CARE PLAN
Gail called back and asked that an ICD10 code be included with the refill when it is called to the other pharmacy.   Principal Discharge DX:	Abdominal pain

## 2020-07-27 NOTE — ED PROVIDER NOTE - CLINICAL SUMMARY MEDICAL DECISION MAKING FREE TEXT BOX
84M presenting with several weeks of abdominal pain and lower back and leg pain. tolerating PO, normal bowel movements. no current abdominal pain. full lower extremity ROM without tenderness. rectal mass palpated, likely cyst. non-tender and no bleeding. plan for labs, CT abdomen and pelvis.

## 2020-07-27 NOTE — ED PROVIDER NOTE - PATIENT PORTAL LINK FT
You can access the FollowMyHealth Patient Portal offered by [facility] by registering at the following website: [email or web address]. By joining [email or web address]’s [email or web address] portal, you will also be able to view your health information using other applications (apps) compatible with our system.

## 2020-08-04 ENCOUNTER — APPOINTMENT (OUTPATIENT)
Dept: OTOLARYNGOLOGY | Facility: CLINIC | Age: 85
End: 2020-08-04
Payer: MEDICARE

## 2020-08-04 VITALS
BODY MASS INDEX: 29.41 KG/M2 | DIASTOLIC BLOOD PRESSURE: 64 MMHG | TEMPERATURE: 98.1 F | WEIGHT: 183 LBS | OXYGEN SATURATION: 97 % | HEIGHT: 66 IN | HEART RATE: 101 BPM | SYSTOLIC BLOOD PRESSURE: 145 MMHG

## 2020-08-04 PROCEDURE — 99214 OFFICE O/P EST MOD 30 MIN: CPT | Mod: 25

## 2020-08-04 PROCEDURE — 31575 DIAGNOSTIC LARYNGOSCOPY: CPT

## 2020-08-04 NOTE — HISTORY OF PRESENT ILLNESS
[de-identified] : This is a patient referred by Dr. Naidu for thyroid nodule. This was found a few months ago during physical exam/US.\par No dysphagia or dyspnea. pt c/o hoarseness. He was  found to have thyroid nodules and referred to Dr Naidu. \par ct of chest  4/2020 showed new hypodense enlarge thyroid extending to the superior mediastinum. \par sono of thyroid 5/2020 showed - 2.4 cm right  lower pole and and left mid pole 3.3cm\par fna of the left thyroid  - atypical  bet cat III and right favor lymphocytic thyroiditis. AFIRMA of this nodule came back negative.\par The patient has never smoked\par mild dysphagia, no dyspnea, no wt loss\par pt is here to day to review FNA result- molecular testing wass negative.\par Complete review of systems which was performed during a previous encounter was reviewed with the patient and there are no changes except as stated in the HPI section.\par \par

## 2020-08-04 NOTE — CONSULT LETTER
[Dear  ___] : Dear  [unfilled], [Courtesy Letter:] : I had the pleasure of seeing your patient, [unfilled], in my office today. [Please see my note below.] : Please see my note below. [Consult Closing:] : Thank you very much for allowing me to participate in the care of this patient.  If you have any questions, please do not hesitate to contact me. [Sincerely,] : Sincerely, [FreeTextEntry2] : Dr Sammy Naidu [FreeTextEntry3] : \par Ramos Wisdom MD, FACS\par \par Otolaryngology-Head and Neck Surgery\par [employer] [facility]\par

## 2020-09-02 ENCOUNTER — EMERGENCY (EMERGENCY)
Facility: HOSPITAL | Age: 85
LOS: 1 days | Discharge: HOME | End: 2020-09-02
Attending: EMERGENCY MEDICINE
Payer: COMMERCIAL

## 2020-09-02 VITALS
OXYGEN SATURATION: 98 % | HEIGHT: 64.57 IN | RESPIRATION RATE: 18 BRPM | TEMPERATURE: 97 F | WEIGHT: 185.19 LBS | SYSTOLIC BLOOD PRESSURE: 119 MMHG | HEART RATE: 84 BPM | DIASTOLIC BLOOD PRESSURE: 68 MMHG

## 2020-09-02 VITALS
RESPIRATION RATE: 20 BRPM | HEART RATE: 86 BPM | SYSTOLIC BLOOD PRESSURE: 115 MMHG | OXYGEN SATURATION: 95 % | TEMPERATURE: 98 F | DIASTOLIC BLOOD PRESSURE: 56 MMHG

## 2020-09-02 DIAGNOSIS — Z98.890 OTHER SPECIFIED POSTPROCEDURAL STATES: Chronic | ICD-10-CM

## 2020-09-02 DIAGNOSIS — M95.0 ACQUIRED DEFORMITY OF NOSE: Chronic | ICD-10-CM

## 2020-09-02 DIAGNOSIS — Z90.49 ACQUIRED ABSENCE OF OTHER SPECIFIED PARTS OF DIGESTIVE TRACT: Chronic | ICD-10-CM

## 2020-09-02 LAB
ALBUMIN SERPL ELPH-MCNC: 3.8 G/DL — SIGNIFICANT CHANGE UP (ref 3.5–5)
ALP SERPL-CCNC: 66 U/L — SIGNIFICANT CHANGE UP (ref 40–120)
ALT FLD-CCNC: 22 U/L DA — SIGNIFICANT CHANGE UP (ref 10–60)
ANION GAP SERPL CALC-SCNC: 5 MMOL/L — SIGNIFICANT CHANGE UP (ref 5–17)
APPEARANCE UR: CLEAR — SIGNIFICANT CHANGE UP
AST SERPL-CCNC: 17 U/L — SIGNIFICANT CHANGE UP (ref 10–40)
BASOPHILS # BLD AUTO: 0.07 K/UL — SIGNIFICANT CHANGE UP (ref 0–0.2)
BASOPHILS NFR BLD AUTO: 0.6 % — SIGNIFICANT CHANGE UP (ref 0–2)
BILIRUB SERPL-MCNC: 0.7 MG/DL — SIGNIFICANT CHANGE UP (ref 0.2–1.2)
BILIRUB UR-MCNC: NEGATIVE — SIGNIFICANT CHANGE UP
BUN SERPL-MCNC: 16 MG/DL — SIGNIFICANT CHANGE UP (ref 7–18)
CALCIUM SERPL-MCNC: 8.8 MG/DL — SIGNIFICANT CHANGE UP (ref 8.4–10.5)
CHLORIDE SERPL-SCNC: 105 MMOL/L — SIGNIFICANT CHANGE UP (ref 96–108)
CO2 SERPL-SCNC: 28 MMOL/L — SIGNIFICANT CHANGE UP (ref 22–31)
COLOR SPEC: YELLOW — SIGNIFICANT CHANGE UP
CREAT SERPL-MCNC: 1.31 MG/DL — HIGH (ref 0.5–1.3)
DIFF PNL FLD: ABNORMAL
EOSINOPHIL # BLD AUTO: 0.01 K/UL — SIGNIFICANT CHANGE UP (ref 0–0.5)
EOSINOPHIL NFR BLD AUTO: 0.1 % — SIGNIFICANT CHANGE UP (ref 0–6)
GLUCOSE SERPL-MCNC: 127 MG/DL — HIGH (ref 70–99)
GLUCOSE UR QL: NEGATIVE — SIGNIFICANT CHANGE UP
HCT VFR BLD CALC: 40.9 % — SIGNIFICANT CHANGE UP (ref 39–50)
HGB BLD-MCNC: 13.2 G/DL — SIGNIFICANT CHANGE UP (ref 13–17)
IMM GRANULOCYTES NFR BLD AUTO: 0.6 % — SIGNIFICANT CHANGE UP (ref 0–1.5)
KETONES UR-MCNC: NEGATIVE — SIGNIFICANT CHANGE UP
LEUKOCYTE ESTERASE UR-ACNC: NEGATIVE — SIGNIFICANT CHANGE UP
LYMPHOCYTES # BLD AUTO: 0.47 K/UL — LOW (ref 1–3.3)
LYMPHOCYTES # BLD AUTO: 3.8 % — LOW (ref 13–44)
MCHC RBC-ENTMCNC: 32.3 GM/DL — SIGNIFICANT CHANGE UP (ref 32–36)
MCHC RBC-ENTMCNC: 33.1 PG — SIGNIFICANT CHANGE UP (ref 27–34)
MCV RBC AUTO: 102.5 FL — HIGH (ref 80–100)
MONOCYTES # BLD AUTO: 0.43 K/UL — SIGNIFICANT CHANGE UP (ref 0–0.9)
MONOCYTES NFR BLD AUTO: 3.5 % — SIGNIFICANT CHANGE UP (ref 2–14)
NEUTROPHILS # BLD AUTO: 11.41 K/UL — HIGH (ref 1.8–7.4)
NEUTROPHILS NFR BLD AUTO: 91.4 % — HIGH (ref 43–77)
NITRITE UR-MCNC: NEGATIVE — SIGNIFICANT CHANGE UP
NRBC # BLD: 0 /100 WBCS — SIGNIFICANT CHANGE UP (ref 0–0)
PH UR: 7 — SIGNIFICANT CHANGE UP (ref 5–8)
PLATELET # BLD AUTO: 215 K/UL — SIGNIFICANT CHANGE UP (ref 150–400)
POTASSIUM SERPL-MCNC: 4.9 MMOL/L — SIGNIFICANT CHANGE UP (ref 3.5–5.3)
POTASSIUM SERPL-SCNC: 4.9 MMOL/L — SIGNIFICANT CHANGE UP (ref 3.5–5.3)
PROT SERPL-MCNC: 7.7 G/DL — SIGNIFICANT CHANGE UP (ref 6–8.3)
PROT UR-MCNC: NEGATIVE — SIGNIFICANT CHANGE UP
RBC # BLD: 3.99 M/UL — LOW (ref 4.2–5.8)
RBC # FLD: 12.5 % — SIGNIFICANT CHANGE UP (ref 10.3–14.5)
SODIUM SERPL-SCNC: 138 MMOL/L — SIGNIFICANT CHANGE UP (ref 135–145)
SP GR SPEC: 1.01 — SIGNIFICANT CHANGE UP (ref 1.01–1.02)
UROBILINOGEN FLD QL: NEGATIVE — SIGNIFICANT CHANGE UP
WBC # BLD: 12.46 K/UL — HIGH (ref 3.8–10.5)
WBC # FLD AUTO: 12.46 K/UL — HIGH (ref 3.8–10.5)

## 2020-09-02 PROCEDURE — 87086 URINE CULTURE/COLONY COUNT: CPT

## 2020-09-02 PROCEDURE — 99284 EMERGENCY DEPT VISIT MOD MDM: CPT

## 2020-09-02 PROCEDURE — 51702 INSERT TEMP BLADDER CATH: CPT

## 2020-09-02 PROCEDURE — 81001 URINALYSIS AUTO W/SCOPE: CPT

## 2020-09-02 PROCEDURE — 36415 COLL VENOUS BLD VENIPUNCTURE: CPT

## 2020-09-02 PROCEDURE — 85027 COMPLETE CBC AUTOMATED: CPT

## 2020-09-02 PROCEDURE — 80053 COMPREHEN METABOLIC PANEL: CPT

## 2020-09-02 PROCEDURE — 99284 EMERGENCY DEPT VISIT MOD MDM: CPT | Mod: 25

## 2020-09-02 NOTE — ED PROVIDER NOTE - PROGRESS NOTE DETAILS
bladder scan shows over 600CCs of urine, mac to be placed pt feels better after mac placed.  Will d/c with leg bag and urology follow up.

## 2020-09-02 NOTE — ED PROVIDER NOTE - PATIENT PORTAL LINK FT
You can access the FollowMyHealth Patient Portal offered by [facility] by registering at the following website: [email or web address]. By joining [employer]’s FollowMyHealth portal, you will also be able to view your health information using other applications (apps) compatible with our system.

## 2020-09-02 NOTE — ED PROVIDER NOTE - OBJECTIVE STATEMENT
85 y/o male with PMHx of HTN, hypothyroid, BPH presents to the ED c/o urinary retention x yesterday. Pt notes urge to urinate but nothing is coming out. Pt denies abd pain, vomiting, or any other complaints. NKDA.

## 2020-09-03 LAB
CULTURE RESULTS: NO GROWTH — SIGNIFICANT CHANGE UP
SPECIMEN SOURCE: SIGNIFICANT CHANGE UP

## 2020-09-03 NOTE — ED POST DISCHARGE NOTE - DETAILS
Pt contacted results given, pt c/o urinary catheter.  pt advised to contact PCP for follow-up with urologist.

## 2020-09-09 ENCOUNTER — APPOINTMENT (OUTPATIENT)
Dept: UROLOGY | Facility: CLINIC | Age: 85
End: 2020-09-09
Payer: MEDICARE

## 2020-09-09 DIAGNOSIS — N28.1 CYST OF KIDNEY, ACQUIRED: ICD-10-CM

## 2020-09-09 DIAGNOSIS — E04.2 NONTOXIC MULTINODULAR GOITER: ICD-10-CM

## 2020-09-09 PROCEDURE — 51700 IRRIGATION OF BLADDER: CPT

## 2020-09-09 PROCEDURE — 99214 OFFICE O/P EST MOD 30 MIN: CPT | Mod: 25

## 2020-09-09 NOTE — PHYSICAL EXAM
[General Appearance - Well Developed] : well developed [General Appearance - Well Nourished] : well nourished [Well Groomed] : well groomed [Normal Appearance] : normal appearance [General Appearance - In No Acute Distress] : no acute distress [Abdomen Soft] : soft [Abdomen Tenderness] : non-tender [Costovertebral Angle Tenderness] : no ~M costovertebral angle tenderness [Urethral Meatus] : meatus normal [Urinary Bladder Findings] : the bladder was normal on palpation [Scrotum] : the scrotum was normal [Testes Mass (___cm)] : there were no testicular masses [FreeTextEntry1] : Pizarro with clear yellow urine [Edema] : no peripheral edema [] : no respiratory distress [Respiration, Rhythm And Depth] : normal respiratory rhythm and effort [Exaggerated Use Of Accessory Muscles For Inspiration] : no accessory muscle use [Oriented To Time, Place, And Person] : oriented to person, place, and time [Affect] : the affect was normal [Mood] : the mood was normal [Not Anxious] : not anxious [No Focal Deficits] : no focal deficits [Normal Station and Gait] : the gait and station were normal for the patient's age [No Palpable Adenopathy] : no palpable adenopathy

## 2020-09-09 NOTE — HISTORY OF PRESENT ILLNESS
[Urinary Urgency] : no urinary urgency [FreeTextEntry1] : Very pleasant 84-year-old gentleman who presents for follow-up with new complaints of urinary retention.  Patient recently went to the emergency department with complaints of an inability to urinate.  He reports significant suprapubic pain at this time.  A Pizarro catheter was placed.  A significant amount of urine drained when Pizarro catheter was placed.  He continues to take tamsulosin.  He has not had a trial of void yet.  He denies dysuria.  No hematuria.  No flank pain or suprapubic pain.  No specific timing to his symptoms.  No aggravating or alleviating factors that he knows of.  No other complaints. [Urinary Retention] : urinary retention [Weak Stream] : no weak stream [Urinary Frequency] : urinary frequency [Straining] : no straining [Dysuria] : no dysuria [Bladder Spasm] : no bladder spasm [Hematuria - Gross] : no gross hematuria [Flank Pain] : no flank pain [Abdominal Pain] : no abdominal pain [Fever] : no fever [Anorexia] : no anorexia [Fatigue] : no fatigue [Nausea] : no nausea

## 2020-09-09 NOTE — ASSESSMENT
[FreeTextEntry1] : Very pleasant 84-year-old gentleman who presents for evaluation of urinary retention, BPH\par -Continue Flomax\par -Patient was given a trial of void today\par -Bladder was instilled with 400 cc of sterile water and he was able to urinate 280 cc\par -He subsequently urinated again with a PVR of 300\par -We discussed options for management moving forward, including replacing Pizarro catheter versus leaving catheter out\par -Discussed risks of urinary retention after a thorough discussion of the risks he would like to leave the catheter out\par -Follow-up in 2 days for repeat PVR\par -We briefly discussed surgical options for BPH, however he would like to avoid surgery at this time if possible

## 2020-09-10 ENCOUNTER — EMERGENCY (EMERGENCY)
Facility: HOSPITAL | Age: 85
LOS: 1 days | Discharge: HOME | End: 2020-09-10
Attending: EMERGENCY MEDICINE
Payer: COMMERCIAL

## 2020-09-10 VITALS
OXYGEN SATURATION: 98 % | DIASTOLIC BLOOD PRESSURE: 73 MMHG | RESPIRATION RATE: 16 BRPM | WEIGHT: 180.78 LBS | HEART RATE: 115 BPM | SYSTOLIC BLOOD PRESSURE: 144 MMHG | TEMPERATURE: 97 F | HEIGHT: 64.57 IN

## 2020-09-10 VITALS
OXYGEN SATURATION: 99 % | RESPIRATION RATE: 16 BRPM | SYSTOLIC BLOOD PRESSURE: 120 MMHG | HEART RATE: 94 BPM | DIASTOLIC BLOOD PRESSURE: 73 MMHG

## 2020-09-10 DIAGNOSIS — Z98.890 OTHER SPECIFIED POSTPROCEDURAL STATES: Chronic | ICD-10-CM

## 2020-09-10 DIAGNOSIS — Z90.49 ACQUIRED ABSENCE OF OTHER SPECIFIED PARTS OF DIGESTIVE TRACT: Chronic | ICD-10-CM

## 2020-09-10 DIAGNOSIS — M95.0 ACQUIRED DEFORMITY OF NOSE: Chronic | ICD-10-CM

## 2020-09-10 LAB
APPEARANCE UR: CLEAR — SIGNIFICANT CHANGE UP
BILIRUB UR-MCNC: NEGATIVE — SIGNIFICANT CHANGE UP
COLOR SPEC: YELLOW — SIGNIFICANT CHANGE UP
DIFF PNL FLD: NEGATIVE — SIGNIFICANT CHANGE UP
GLUCOSE UR QL: NEGATIVE — SIGNIFICANT CHANGE UP
KETONES UR-MCNC: NEGATIVE — SIGNIFICANT CHANGE UP
LEUKOCYTE ESTERASE UR-ACNC: NEGATIVE — SIGNIFICANT CHANGE UP
NITRITE UR-MCNC: NEGATIVE — SIGNIFICANT CHANGE UP
PH UR: 6 — SIGNIFICANT CHANGE UP (ref 5–8)
PROT UR-MCNC: NEGATIVE — SIGNIFICANT CHANGE UP
SP GR SPEC: 1.01 — SIGNIFICANT CHANGE UP (ref 1.01–1.02)
UROBILINOGEN FLD QL: NEGATIVE — SIGNIFICANT CHANGE UP

## 2020-09-10 PROCEDURE — 51702 INSERT TEMP BLADDER CATH: CPT

## 2020-09-10 PROCEDURE — 99283 EMERGENCY DEPT VISIT LOW MDM: CPT | Mod: 25

## 2020-09-10 PROCEDURE — 87086 URINE CULTURE/COLONY COUNT: CPT

## 2020-09-10 PROCEDURE — 87186 SC STD MICRODIL/AGAR DIL: CPT

## 2020-09-10 PROCEDURE — 81003 URINALYSIS AUTO W/O SCOPE: CPT

## 2020-09-10 NOTE — ED PROVIDER NOTE - PATIENT PORTAL LINK FT
You can access the FollowMyHealth Patient Portal offered by [facility] by registering at the following website: [email or web address]. By joining [employer]’s FollowMyHealth portal, you will also be able to view your health information using other applications (apps) compatible with our system.

## 2020-09-10 NOTE — ED PROVIDER NOTE - NSFOLLOWUPINSTRUCTIONS_ED_ALL_ED_FT
IMPORTANT INSTRUCTIONS FROM Dr. SPIVEY:    Please follow up with your personal medical doctor in 24-48 hours.   Bring results from today to your visit.  See Dr Ayala at the appt you have.       If your symptoms change, get worse or if you have any new symptoms, come to the ER right away.  If you have any questions, call the ER at the phone number on this page.

## 2020-09-10 NOTE — ED ADULT NURSE NOTE - NSIMPLEMENTINTERV_GEN_ALL_ED
Implemented All Universal Safety Interventions:  [place] to call system. Call bell, personal items and telephone within reach. Instruct patient to call for assistance. Room bathroom lighting operational. Non-slip footwear when patient is off stretcher. Physically safe environment: no spills, clutter or unnecessary equipment. Stretcher in lowest position, wheels locked, appropriate side rails in place.

## 2020-09-10 NOTE — ED ADULT NURSE NOTE - OBJECTIVE STATEMENT
presented to ED c/o unable to urinate x1 day. bladder scan was done and it was 745ml MD fink made aware. patient denies chest pain and SOB. presented to ED with c/o unable to urinate x1 day. bladder scan was done and it was 745ml MD fink made aware. patient denies chest pain and SOB.

## 2020-09-10 NOTE — ED PROVIDER NOTE - OBJECTIVE STATEMENT
83 y/o M with a significant PMHx of urinary retention, BPH and hypothyroidism, on tamsulosin, had Pizarro catheter removed yesterday with a small amount of urine at the time. Patient since has been unable to urinate, feels that his bladder is full and is uncomfortable to him. Denies any other acute complaints.

## 2020-09-10 NOTE — ED PROVIDER NOTE - CLINICAL SUMMARY MEDICAL DECISION MAKING FREE TEXT BOX
85 y/o M presents with urinary retention. Discussed with nurse. Bladder has 700+ cc of urine., will replace Pizarro and refer back to urologist.

## 2020-09-10 NOTE — ED PROVIDER NOTE - NSTIMEPROVIDERCAREINITIATE_GEN_ER
10-Sep-2020 09:36 DVT ppx: HSQ    Diet: DASH    Husam Quan D.O.  PGY-1 EM/IM  Pager #[phone] DVT ppx: HSQ    Diet: DASH    Disposition: Home tomorrow AM  Husam Quan D.O.  PGY-1 EM/IM  Pager #[phone]

## 2020-09-10 NOTE — ED PROVIDER NOTE - PSH
H/O hernia repair    H/O surgical biopsy  lungs bilaterally  Nasal deviation  repaired  No significant past surgical history    S/P cholecystectomy

## 2020-09-11 ENCOUNTER — APPOINTMENT (OUTPATIENT)
Dept: UROLOGY | Facility: CLINIC | Age: 85
End: 2020-09-11

## 2020-09-14 LAB — BACTERIA UR CULT: ABNORMAL

## 2020-09-14 RX ORDER — CIPROFLOXACIN HYDROCHLORIDE 500 MG/1
500 TABLET, FILM COATED ORAL TWICE DAILY
Qty: 14 | Refills: 0 | Status: DISCONTINUED | COMMUNITY
Start: 2018-03-13 | End: 2020-09-14

## 2020-09-15 ENCOUNTER — APPOINTMENT (OUTPATIENT)
Dept: UROLOGY | Facility: CLINIC | Age: 85
End: 2020-09-15
Payer: MEDICARE

## 2020-09-15 VITALS — DIASTOLIC BLOOD PRESSURE: 75 MMHG | HEART RATE: 75 BPM | TEMPERATURE: 97.8 F | SYSTOLIC BLOOD PRESSURE: 146 MMHG

## 2020-09-15 DIAGNOSIS — N20.0 CALCULUS OF KIDNEY: ICD-10-CM

## 2020-09-15 DIAGNOSIS — N28.1 CYST OF KIDNEY, ACQUIRED: ICD-10-CM

## 2020-09-15 PROCEDURE — 52000 CYSTOURETHROSCOPY: CPT

## 2020-09-15 PROCEDURE — 99214 OFFICE O/P EST MOD 30 MIN: CPT | Mod: 25

## 2020-09-15 NOTE — PHYSICAL EXAM
[General Appearance - Well Developed] : well developed [General Appearance - Well Nourished] : well nourished [Normal Appearance] : normal appearance [Well Groomed] : well groomed [General Appearance - In No Acute Distress] : no acute distress [Abdomen Soft] : soft [Abdomen Tenderness] : non-tender [Costovertebral Angle Tenderness] : no ~M costovertebral angle tenderness [Urethral Meatus] : meatus normal [Urinary Bladder Findings] : the bladder was normal on palpation [Scrotum] : the scrotum was normal [Testes Mass (___cm)] : there were no testicular masses [FreeTextEntry1] : Pizarro with clear yellow urine [Edema] : no peripheral edema [] : no respiratory distress [Respiration, Rhythm And Depth] : normal respiratory rhythm and effort [Exaggerated Use Of Accessory Muscles For Inspiration] : no accessory muscle use [Oriented To Time, Place, And Person] : oriented to person, place, and time [Affect] : the affect was normal [Mood] : the mood was normal [Not Anxious] : not anxious [Normal Station and Gait] : the gait and station were normal for the patient's age [No Focal Deficits] : no focal deficits [No Palpable Adenopathy] : no palpable adenopathy

## 2020-09-15 NOTE — HISTORY OF PRESENT ILLNESS
[FreeTextEntry1] : Very pleasant 84-year-old gentleman who presents for follow-up of BPH, urinary retention, UTI.  He was recently evaluated in the emergency department was found to be in urinary retention.  A Pizarro catheter was placed.  He was given a trial of void and he was able to urinate, however subsequently went back to the emergency department later that day with complaints of inability to urinate.  Additionally, he was noted to have a urinary tract infection on urine culture.  He was started on antibiotics yesterday.  He underwent a cystoscopy today which demonstrates significant bilobar prostatic hyperplasia.  He was unable to urinate after the procedure.  A Pizarro catheter was replaced.  He denies specific timing to his symptoms.  No aggravating or alleviating factors that he knows of.  No other complaints. [Urinary Retention] : urinary retention [Urinary Urgency] : no urinary urgency [Urinary Frequency] : urinary frequency [Straining] : no straining [Weak Stream] : no weak stream [Dysuria] : no dysuria [Hematuria - Gross] : no gross hematuria [Bladder Spasm] : no bladder spasm [Abdominal Pain] : no abdominal pain [Flank Pain] : no flank pain [Fever] : no fever [Fatigue] : no fatigue [Nausea] : no nausea [Anorexia] : no anorexia

## 2020-09-15 NOTE — ASSESSMENT
[FreeTextEntry1] : Very pleasant 84-year-old gentleman with BPH, urinary retention, acute UTI\par -Continue antibiotics\par -Cystoscopy today demonstrates a significantly enlarged prostate with bilobar hyperplasia and urinary obstruction\par -Prior labs reviewed with the patient\par -I discussed the different treatment modalities for benign prostatic hyperplasia (BPH) with the patient, including medical management, office procedures, Greenlight laser vaporization of the prostate, transurethral resection of the prostate (TURP), laser enucleation of the prostate, and open/robotic simple prostatectomy. Given the size of the gland and the patient's symptoms the patient opted to proceed with transurethral resection of the prostate.  The risks, benefits, and alternatives to transurethral resection of the prostate were discussed with the patient, including but not limited to abdominal and penile pain, infection, bleeding, hyponatremia, bladder injury, urethral injury including the formation of a urethral stricture, ureteral injury, incontinence, and treatment failure.  I also discussed the risk of retrograde ejaculation, anejaculation, infertility, and erectile dysfunction with the patient. We discussed the need for a temporary indwelling catheter following the procedure and the need to stay in the hospital post-operatively.  The patient wishes to proceed and we will schedule the surgery for the near future.\par -Follow-up in 2 weeks for Pizarro catheter change in urine culture in anticipation of surgery

## 2020-09-28 ENCOUNTER — OUTPATIENT (OUTPATIENT)
Dept: OUTPATIENT SERVICES | Facility: HOSPITAL | Age: 85
LOS: 1 days | End: 2020-09-28
Payer: COMMERCIAL

## 2020-09-28 VITALS
TEMPERATURE: 98 F | SYSTOLIC BLOOD PRESSURE: 122 MMHG | HEIGHT: 66 IN | WEIGHT: 179.9 LBS | DIASTOLIC BLOOD PRESSURE: 69 MMHG | RESPIRATION RATE: 17 BRPM | HEART RATE: 88 BPM | OXYGEN SATURATION: 99 %

## 2020-09-28 DIAGNOSIS — Z98.890 OTHER SPECIFIED POSTPROCEDURAL STATES: Chronic | ICD-10-CM

## 2020-09-28 DIAGNOSIS — M95.0 ACQUIRED DEFORMITY OF NOSE: Chronic | ICD-10-CM

## 2020-09-28 DIAGNOSIS — Z01.818 ENCOUNTER FOR OTHER PREPROCEDURAL EXAMINATION: ICD-10-CM

## 2020-09-28 DIAGNOSIS — Z90.49 ACQUIRED ABSENCE OF OTHER SPECIFIED PARTS OF DIGESTIVE TRACT: Chronic | ICD-10-CM

## 2020-09-28 DIAGNOSIS — E03.9 HYPOTHYROIDISM, UNSPECIFIED: ICD-10-CM

## 2020-09-28 DIAGNOSIS — N40.1 BENIGN PROSTATIC HYPERPLASIA WITH LOWER URINARY TRACT SYMPTOMS: ICD-10-CM

## 2020-09-28 DIAGNOSIS — I10 ESSENTIAL (PRIMARY) HYPERTENSION: ICD-10-CM

## 2020-09-28 LAB
APPEARANCE UR: ABNORMAL
BACTERIA # UR AUTO: ABNORMAL /HPF
BILIRUB UR-MCNC: NEGATIVE — SIGNIFICANT CHANGE UP
BLD GP AB SCN SERPL QL: SIGNIFICANT CHANGE UP
COLOR SPEC: YELLOW — SIGNIFICANT CHANGE UP
COMMENT - URINE: SIGNIFICANT CHANGE UP
DIFF PNL FLD: ABNORMAL
EPI CELLS # UR: ABNORMAL /HPF
GLUCOSE UR QL: NEGATIVE — SIGNIFICANT CHANGE UP
KETONES UR-MCNC: NEGATIVE — SIGNIFICANT CHANGE UP
LEUKOCYTE ESTERASE UR-ACNC: ABNORMAL
NITRITE UR-MCNC: POSITIVE
PH UR: 5 — SIGNIFICANT CHANGE UP (ref 5–8)
PROT UR-MCNC: 100
RBC CASTS # UR COMP ASSIST: ABNORMAL /HPF (ref 0–2)
SP GR SPEC: 1.02 — SIGNIFICANT CHANGE UP (ref 1.01–1.02)
UROBILINOGEN FLD QL: NEGATIVE — SIGNIFICANT CHANGE UP
WBC UR QL: >50 /HPF (ref 0–5)

## 2020-09-28 PROCEDURE — G0463: CPT

## 2020-09-28 NOTE — H&P PST ADULT - ABILITY TO HEAR (WITH HEARING AID OR HEARING APPLIANCE IF NORMALLY USED):
Adequate: hears normal conversation without difficulty Mildly to Moderately Impaired: difficulty hearing in some environments or speaker may need to increase volume or speak distinctly/Patient is told to wear his hearing aid the day of the procedure

## 2020-09-28 NOTE — H&P PST ADULT - REASON FOR ADMISSION
I have been on medication for enlarged prostate. I was not able to urinate. I have a catheter to urinate. Now they say I need surgery

## 2020-09-28 NOTE — H&P PST ADULT - NSICDXPASTMEDICALHX_GEN_ALL_CORE_FT
PAST MEDICAL HISTORY:  Arthritis     BPH (benign prostatic hypertrophy)     Chronic back pain     H/O dizziness     Herniated disc     Histoplasmosis     Hypertension     Hypothyroid     Insomnia     Other pulmonary embolism without acute cor pulmonale, unspecified chronicity off Xarelto x 2 years    Renal cysts, acquired, bilateral     Varicose veins

## 2020-09-28 NOTE — H&P PST ADULT - ENDOCRINE COMMENTS
(+) hypothyroidism (+) hypothyroidism.  Patient states that his voice has changed due enlarged thyroid gland

## 2020-09-28 NOTE — H&P PST ADULT - NSICDXPASTSURGICALHX_GEN_ALL_CORE_FT
PAST SURGICAL HISTORY:  H/O hernia repair     H/O surgical biopsy lungs bilaterally    Nasal deviation repaired    No significant past surgical history     S/P cholecystectomy      PAST SURGICAL HISTORY:  H/O hernia repair     H/O surgical biopsy lungs bilaterally    Nasal deviation repaired    S/P cholecystectomy

## 2020-09-28 NOTE — H&P PST ADULT - HISTORY OF PRESENT ILLNESS
86 yo male with history of Hypothyroidism, HTN, BPH, Arthritis, Dizziness, Insomnia, reports the above.  He is scheduled for : Cystoscopy  Tranurethral Resection of Prostate, on 10/1/20

## 2020-09-28 NOTE — H&P PST ADULT - NSICDXPROBLEM_GEN_ALL_CORE_FT
PROBLEM DIAGNOSES  Problem: Enlarged prostate with lower urinary tract symptoms (LUTS)  Assessment and Plan: Cystoscopy  Transurethral Resection of Prostate      Problem: Hypothyroidism  Assessment and Plan: Continue medication as prescribedenlarged prostate    Problem: Hypertension  Assessment and Plan: Continue BP medication as prescribed

## 2020-09-29 ENCOUNTER — APPOINTMENT (OUTPATIENT)
Dept: DISASTER EMERGENCY | Facility: CLINIC | Age: 85
End: 2020-09-29

## 2020-09-29 ENCOUNTER — APPOINTMENT (OUTPATIENT)
Dept: UROLOGY | Facility: CLINIC | Age: 85
End: 2020-09-29
Payer: MEDICARE

## 2020-09-29 DIAGNOSIS — R33.8 OTHER RETENTION OF URINE: ICD-10-CM

## 2020-09-29 PROCEDURE — 99214 OFFICE O/P EST MOD 30 MIN: CPT | Mod: 25

## 2020-09-29 PROCEDURE — 51702 INSERT TEMP BLADDER CATH: CPT

## 2020-09-29 NOTE — HISTORY OF PRESENT ILLNESS
[FreeTextEntry1] : Very pleasant 85-year-old gentleman who presents for follow-up of BPH with urinary obstruction, urinary retention.  Patient feels well.  He is scheduled to undergo a TURP in 2 days.  He presents today to have Pizarro catheter changed and had multiple questions regarding the surgery.  Previously, urine culture was positive.  No specific timing to her symptoms.  No aggravating or alleviating factors that he knows of.  No other complaints. [Urinary Retention] : urinary retention [Urinary Urgency] : no urinary urgency [Urinary Frequency] : urinary frequency [Straining] : no straining [Weak Stream] : no weak stream [Dysuria] : no dysuria [Hematuria - Gross] : no gross hematuria [Bladder Spasm] : no bladder spasm [Abdominal Pain] : no abdominal pain [Flank Pain] : no flank pain [Fever] : no fever [Fatigue] : no fatigue [Nausea] : no nausea [Anorexia] : no anorexia

## 2020-09-29 NOTE — ASSESSMENT
[FreeTextEntry1] : Very pleasant 85-year-old gentleman who presents for follow-up of BPH with urinary obstruction, urinary retention, UTI\par -Pizarro catheter changed today\par -Start Augmentin based off of prior cultures\par -Urine culture today\par -\par I discussed the different treatment modalities for benign prostatic hyperplasia (BPH) with the patient, including medical management, office procedures, Greenlight laser vaporization of the prostate, transurethral resection of the prostate (TURP), laser enucleation of the prostate, and open/robotic simple prostatectomy. Given the size of the gland and the patient's symptoms, I recommend and the patient opted to proceed with transurethral resection of the prostate.  The risks, benefits, and alternatives to transurethral resection of the prostate were discussed with the patient, including but not limited to abdominal and penile pain, infection, bleeding, hyponatremia, bladder injury, urethral injury including the formation of a urethral stricture, ureteral injury, incontinence, and treatment failure.  I also discussed the risk of retrograde ejaculation, anejaculation, infertility, and erectile dysfunction with the patient. We discussed the need for a temporary indwelling catheter following the procedure and the need to stay in the hospital post-operatively. \par -All questions answered to apparent satisfaction

## 2020-09-30 ENCOUNTER — TRANSCRIPTION ENCOUNTER (OUTPATIENT)
Age: 85
End: 2020-09-30

## 2020-09-30 LAB
-  AMIKACIN: SIGNIFICANT CHANGE UP
-  AMOXICILLIN/CLAVULANIC ACID: SIGNIFICANT CHANGE UP
-  AMPICILLIN/SULBACTAM: SIGNIFICANT CHANGE UP
-  AMPICILLIN: SIGNIFICANT CHANGE UP
-  AZTREONAM: SIGNIFICANT CHANGE UP
-  CEFAZOLIN: SIGNIFICANT CHANGE UP
-  CEFEPIME: SIGNIFICANT CHANGE UP
-  CEFOXITIN: SIGNIFICANT CHANGE UP
-  CEFTRIAXONE: SIGNIFICANT CHANGE UP
-  CIPROFLOXACIN: SIGNIFICANT CHANGE UP
-  ERTAPENEM: SIGNIFICANT CHANGE UP
-  GENTAMICIN: SIGNIFICANT CHANGE UP
-  IMIPENEM: SIGNIFICANT CHANGE UP
-  LEVOFLOXACIN: SIGNIFICANT CHANGE UP
-  MEROPENEM: SIGNIFICANT CHANGE UP
-  NITROFURANTOIN: SIGNIFICANT CHANGE UP
-  PIPERACILLIN/TAZOBACTAM: SIGNIFICANT CHANGE UP
-  TIGECYCLINE: SIGNIFICANT CHANGE UP
-  TOBRAMYCIN: SIGNIFICANT CHANGE UP
-  TRIMETHOPRIM/SULFAMETHOXAZOLE: SIGNIFICANT CHANGE UP
CULTURE RESULTS: SIGNIFICANT CHANGE UP
METHOD TYPE: SIGNIFICANT CHANGE UP
ORGANISM # SPEC MICROSCOPIC CNT: SIGNIFICANT CHANGE UP
ORGANISM # SPEC MICROSCOPIC CNT: SIGNIFICANT CHANGE UP
SARS-COV-2 N GENE NPH QL NAA+PROBE: NOT DETECTED
SPECIMEN SOURCE: SIGNIFICANT CHANGE UP

## 2020-10-01 ENCOUNTER — RESULT REVIEW (OUTPATIENT)
Age: 85
End: 2020-10-01

## 2020-10-01 ENCOUNTER — APPOINTMENT (OUTPATIENT)
Dept: UROLOGY | Facility: HOSPITAL | Age: 85
End: 2020-10-01

## 2020-10-01 ENCOUNTER — INPATIENT (INPATIENT)
Facility: HOSPITAL | Age: 85
LOS: 0 days | Discharge: HOME | DRG: 714 | End: 2020-10-02
Attending: UROLOGY | Admitting: UROLOGY
Payer: COMMERCIAL

## 2020-10-01 VITALS
SYSTOLIC BLOOD PRESSURE: 119 MMHG | HEIGHT: 66 IN | DIASTOLIC BLOOD PRESSURE: 53 MMHG | HEART RATE: 76 BPM | WEIGHT: 179.9 LBS | RESPIRATION RATE: 17 BRPM | TEMPERATURE: 98 F | OXYGEN SATURATION: 98 %

## 2020-10-01 DIAGNOSIS — Z98.890 OTHER SPECIFIED POSTPROCEDURAL STATES: Chronic | ICD-10-CM

## 2020-10-01 DIAGNOSIS — N40.1 BENIGN PROSTATIC HYPERPLASIA WITH LOWER URINARY TRACT SYMPTOMS: ICD-10-CM

## 2020-10-01 DIAGNOSIS — Z90.49 ACQUIRED ABSENCE OF OTHER SPECIFIED PARTS OF DIGESTIVE TRACT: Chronic | ICD-10-CM

## 2020-10-01 DIAGNOSIS — M95.0 ACQUIRED DEFORMITY OF NOSE: Chronic | ICD-10-CM

## 2020-10-01 LAB — BLD GP AB SCN SERPL QL: SIGNIFICANT CHANGE UP

## 2020-10-01 PROCEDURE — 88305 TISSUE EXAM BY PATHOLOGIST: CPT | Mod: 26

## 2020-10-01 RX ORDER — LEVOTHYROXINE SODIUM 125 MCG
100 TABLET ORAL DAILY
Refills: 0 | Status: DISCONTINUED | OUTPATIENT
Start: 2020-10-01 | End: 2020-10-02

## 2020-10-01 RX ORDER — OXYCODONE AND ACETAMINOPHEN 5; 325 MG/1; MG/1
2 TABLET ORAL EVERY 6 HOURS
Refills: 0 | Status: DISCONTINUED | OUTPATIENT
Start: 2020-10-01 | End: 2020-10-02

## 2020-10-01 RX ORDER — SODIUM CHLORIDE 9 MG/ML
1000 INJECTION, SOLUTION INTRAVENOUS
Refills: 0 | Status: DISCONTINUED | OUTPATIENT
Start: 2020-10-01 | End: 2020-10-02

## 2020-10-01 RX ORDER — DILTIAZEM HCL 120 MG
180 CAPSULE, EXT RELEASE 24 HR ORAL DAILY
Refills: 0 | Status: DISCONTINUED | OUTPATIENT
Start: 2020-10-01 | End: 2020-10-02

## 2020-10-01 RX ORDER — ACETAMINOPHEN 500 MG
1000 TABLET ORAL ONCE
Refills: 0 | Status: DISCONTINUED | OUTPATIENT
Start: 2020-10-01 | End: 2020-10-01

## 2020-10-01 RX ORDER — PANTOPRAZOLE SODIUM 20 MG/1
40 TABLET, DELAYED RELEASE ORAL
Refills: 0 | Status: DISCONTINUED | OUTPATIENT
Start: 2020-10-01 | End: 2020-10-02

## 2020-10-01 RX ORDER — ACETAMINOPHEN 500 MG
650 TABLET ORAL EVERY 6 HOURS
Refills: 0 | Status: DISCONTINUED | OUTPATIENT
Start: 2020-10-01 | End: 2020-10-02

## 2020-10-01 RX ORDER — TAMSULOSIN HYDROCHLORIDE 0.4 MG/1
0.4 CAPSULE ORAL AT BEDTIME
Refills: 0 | Status: DISCONTINUED | OUTPATIENT
Start: 2020-10-01 | End: 2020-10-02

## 2020-10-01 RX ORDER — MECLIZINE HCL 12.5 MG
25 TABLET ORAL EVERY 8 HOURS
Refills: 0 | Status: DISCONTINUED | OUTPATIENT
Start: 2020-10-01 | End: 2020-10-02

## 2020-10-01 RX ORDER — INFLUENZA VIRUS VACCINE 15; 15; 15; 15 UG/.5ML; UG/.5ML; UG/.5ML; UG/.5ML
0.5 SUSPENSION INTRAMUSCULAR ONCE
Refills: 0 | Status: DISCONTINUED | OUTPATIENT
Start: 2020-10-01 | End: 2020-10-02

## 2020-10-01 RX ORDER — ONDANSETRON 8 MG/1
4 TABLET, FILM COATED ORAL ONCE
Refills: 0 | Status: DISCONTINUED | OUTPATIENT
Start: 2020-10-01 | End: 2020-10-01

## 2020-10-01 RX ORDER — HEPARIN SODIUM 5000 [USP'U]/ML
5000 INJECTION INTRAVENOUS; SUBCUTANEOUS EVERY 8 HOURS
Refills: 0 | Status: DISCONTINUED | OUTPATIENT
Start: 2020-10-01 | End: 2020-10-02

## 2020-10-01 RX ORDER — OXYCODONE AND ACETAMINOPHEN 5; 325 MG/1; MG/1
1 TABLET ORAL EVERY 4 HOURS
Refills: 0 | Status: DISCONTINUED | OUTPATIENT
Start: 2020-10-01 | End: 2020-10-02

## 2020-10-01 RX ORDER — ZOLPIDEM TARTRATE 10 MG/1
5 TABLET ORAL ONCE
Refills: 0 | Status: DISCONTINUED | OUTPATIENT
Start: 2020-10-01 | End: 2020-10-01

## 2020-10-01 RX ORDER — CIPROFLOXACIN LACTATE 400MG/40ML
400 VIAL (ML) INTRAVENOUS EVERY 12 HOURS
Refills: 0 | Status: DISCONTINUED | OUTPATIENT
Start: 2020-10-01 | End: 2020-10-02

## 2020-10-01 RX ORDER — SODIUM CHLORIDE 9 MG/ML
3 INJECTION INTRAMUSCULAR; INTRAVENOUS; SUBCUTANEOUS EVERY 8 HOURS
Refills: 0 | Status: DISCONTINUED | OUTPATIENT
Start: 2020-10-01 | End: 2020-10-01

## 2020-10-01 RX ORDER — HYDROMORPHONE HYDROCHLORIDE 2 MG/ML
0.5 INJECTION INTRAMUSCULAR; INTRAVENOUS; SUBCUTANEOUS
Refills: 0 | Status: DISCONTINUED | OUTPATIENT
Start: 2020-10-01 | End: 2020-10-01

## 2020-10-01 RX ORDER — HYDROMORPHONE HYDROCHLORIDE 2 MG/ML
1 INJECTION INTRAMUSCULAR; INTRAVENOUS; SUBCUTANEOUS
Refills: 0 | Status: DISCONTINUED | OUTPATIENT
Start: 2020-10-01 | End: 2020-10-01

## 2020-10-01 RX ADMIN — Medication 200 MILLIGRAM(S): at 18:51

## 2020-10-01 RX ADMIN — HEPARIN SODIUM 5000 UNIT(S): 5000 INJECTION INTRAVENOUS; SUBCUTANEOUS at 21:29

## 2020-10-01 RX ADMIN — OXYCODONE AND ACETAMINOPHEN 2 TABLET(S): 5; 325 TABLET ORAL at 17:46

## 2020-10-01 RX ADMIN — ZOLPIDEM TARTRATE 5 MILLIGRAM(S): 10 TABLET ORAL at 21:29

## 2020-10-01 RX ADMIN — OXYCODONE AND ACETAMINOPHEN 2 TABLET(S): 5; 325 TABLET ORAL at 16:46

## 2020-10-01 RX ADMIN — TAMSULOSIN HYDROCHLORIDE 0.4 MILLIGRAM(S): 0.4 CAPSULE ORAL at 21:29

## 2020-10-01 NOTE — PATIENT PROFILE ADULT - NSPROPTRIGHTBILLOFRIGHTS_GEN_A_NUR
Patient presents to Emergency Department with complaints of Nausea, vomiting, and Left Flank pain since approximately 1:00am that woke her from a sound sleep. Assisted patient to undress and placed in hospital gown.      Zuleyka Honeycutt RN  05/01/18 [id number]     patient

## 2020-10-01 NOTE — BRIEF OPERATIVE NOTE - OPERATION/FINDINGS
Cystoscopy performed, bilateral UOs identified in normal orthotopic position. Bipolar loop resection of prostate, chips sent for pathology, adequate hemostasis.

## 2020-10-01 NOTE — ASU PATIENT PROFILE, ADULT - ABILITY TO HEAR (WITH HEARING AID OR HEARING APPLIANCE IF NORMALLY USED):
Mildly to Moderately Impaired: difficulty hearing in some environments or speaker may need to increase volume or speak distinctly/Patient is told to wear his hearing aid the day of the procedure

## 2020-10-01 NOTE — BRIEF OPERATIVE NOTE - NSICDXBRIEFPROCEDURE_GEN_ALL_CORE_FT
PROCEDURES:  Cystoscopy with transurethral resection of prostate (TURP) 01-Oct-2020 14:05:27  Edna Draper

## 2020-10-02 ENCOUNTER — TRANSCRIPTION ENCOUNTER (OUTPATIENT)
Age: 85
End: 2020-10-02

## 2020-10-02 VITALS
RESPIRATION RATE: 18 BRPM | OXYGEN SATURATION: 97 % | SYSTOLIC BLOOD PRESSURE: 137 MMHG | HEART RATE: 86 BPM | TEMPERATURE: 98 F | DIASTOLIC BLOOD PRESSURE: 82 MMHG

## 2020-10-02 LAB
ANION GAP SERPL CALC-SCNC: 3 MMOL/L — LOW (ref 5–17)
BACTERIA UR CULT: ABNORMAL
BASOPHILS # BLD AUTO: 0.08 K/UL — SIGNIFICANT CHANGE UP (ref 0–0.2)
BASOPHILS NFR BLD AUTO: 0.8 % — SIGNIFICANT CHANGE UP (ref 0–2)
BUN SERPL-MCNC: 15 MG/DL — SIGNIFICANT CHANGE UP (ref 7–18)
CALCIUM SERPL-MCNC: 8.1 MG/DL — LOW (ref 8.4–10.5)
CHLORIDE SERPL-SCNC: 107 MMOL/L — SIGNIFICANT CHANGE UP (ref 96–108)
CO2 SERPL-SCNC: 30 MMOL/L — SIGNIFICANT CHANGE UP (ref 22–31)
CREAT SERPL-MCNC: 1.07 MG/DL — SIGNIFICANT CHANGE UP (ref 0.5–1.3)
EOSINOPHIL # BLD AUTO: 0.23 K/UL — SIGNIFICANT CHANGE UP (ref 0–0.5)
EOSINOPHIL NFR BLD AUTO: 2.3 % — SIGNIFICANT CHANGE UP (ref 0–6)
GLUCOSE SERPL-MCNC: 88 MG/DL — SIGNIFICANT CHANGE UP (ref 70–99)
HCT VFR BLD CALC: 33.2 % — LOW (ref 39–50)
HGB BLD-MCNC: 10.6 G/DL — LOW (ref 13–17)
IMM GRANULOCYTES NFR BLD AUTO: 0.6 % — SIGNIFICANT CHANGE UP (ref 0–1.5)
LYMPHOCYTES # BLD AUTO: 1.3 K/UL — SIGNIFICANT CHANGE UP (ref 1–3.3)
LYMPHOCYTES # BLD AUTO: 13.1 % — SIGNIFICANT CHANGE UP (ref 13–44)
MCHC RBC-ENTMCNC: 31.9 GM/DL — LOW (ref 32–36)
MCHC RBC-ENTMCNC: 32.6 PG — SIGNIFICANT CHANGE UP (ref 27–34)
MCV RBC AUTO: 102.2 FL — HIGH (ref 80–100)
MONOCYTES # BLD AUTO: 0.73 K/UL — SIGNIFICANT CHANGE UP (ref 0–0.9)
MONOCYTES NFR BLD AUTO: 7.4 % — SIGNIFICANT CHANGE UP (ref 2–14)
NEUTROPHILS # BLD AUTO: 7.53 K/UL — HIGH (ref 1.8–7.4)
NEUTROPHILS NFR BLD AUTO: 75.8 % — SIGNIFICANT CHANGE UP (ref 43–77)
NRBC # BLD: 0 /100 WBCS — SIGNIFICANT CHANGE UP (ref 0–0)
PLATELET # BLD AUTO: 222 K/UL — SIGNIFICANT CHANGE UP (ref 150–400)
POTASSIUM SERPL-MCNC: 4.2 MMOL/L — SIGNIFICANT CHANGE UP (ref 3.5–5.3)
POTASSIUM SERPL-SCNC: 4.2 MMOL/L — SIGNIFICANT CHANGE UP (ref 3.5–5.3)
RBC # BLD: 3.25 M/UL — LOW (ref 4.2–5.8)
RBC # FLD: 13.2 % — SIGNIFICANT CHANGE UP (ref 10.3–14.5)
SODIUM SERPL-SCNC: 140 MMOL/L — SIGNIFICANT CHANGE UP (ref 135–145)
WBC # BLD: 9.93 K/UL — SIGNIFICANT CHANGE UP (ref 3.8–10.5)
WBC # FLD AUTO: 9.93 K/UL — SIGNIFICANT CHANGE UP (ref 3.8–10.5)

## 2020-10-02 RX ORDER — ACETAMINOPHEN 500 MG
2 TABLET ORAL
Qty: 0 | Refills: 0 | DISCHARGE
Start: 2020-10-02

## 2020-10-02 RX ADMIN — HEPARIN SODIUM 5000 UNIT(S): 5000 INJECTION INTRAVENOUS; SUBCUTANEOUS at 13:07

## 2020-10-02 RX ADMIN — Medication 100 MICROGRAM(S): at 05:57

## 2020-10-02 RX ADMIN — HEPARIN SODIUM 5000 UNIT(S): 5000 INJECTION INTRAVENOUS; SUBCUTANEOUS at 05:57

## 2020-10-02 RX ADMIN — Medication 200 MILLIGRAM(S): at 05:57

## 2020-10-02 RX ADMIN — PANTOPRAZOLE SODIUM 40 MILLIGRAM(S): 20 TABLET, DELAYED RELEASE ORAL at 06:16

## 2020-10-02 RX ADMIN — Medication 25 MILLIGRAM(S): at 10:30

## 2020-10-02 RX ADMIN — Medication 180 MILLIGRAM(S): at 05:57

## 2020-10-02 RX ADMIN — Medication 200 MILLIGRAM(S): at 17:36

## 2020-10-02 NOTE — DISCHARGE NOTE NURSING/CASE MANAGEMENT/SOCIAL WORK - PATIENT PORTAL LINK FT
You can access the FollowMyHealth Patient Portal offered by [facility] by registering at the following website: [email or web address]. By joining [employer]’s FollowMyHealth portal, you will also be able to view your health information using other applications (apps) compatible with our system.

## 2020-10-02 NOTE — PROGRESS NOTE ADULT - SUBJECTIVE AND OBJECTIVE BOX
84 y/o male s/p TURP POD # 1. Patient examined at bedside, no complaints.   Tolerating diet  CBI running slow       T(F): 98 (10-02-20 @ 06:09), Max: 98.4 (10-02-20 @ 00:09)  HR: 71 (10-02-20 @ 06:09) (63 - 96)  BP: 106/44 (10-02-20 @ 06:09) (106/44 - 123/49)  RR: 16 (10-02-20 @ 06:09) (12 - 18)  SpO2: 98% (10-02-20 @ 06:09) (97% - 100%)  Wt(kg): --      10-01 @ 07:01  -  10-02 @ 07:00  --------------------------------------------------------  IN:    Continuous Bladder Irrigation (mL): 6600 mL    Lactated Ringers: 1125 mL  Total IN: 7725 mL    OUT:    Continuous Bladder Irrigation (mL): 61913 mL  Total OUT: 46191 mL    Total NET: -9475 mL                          10.6   9.93  )-----------( 222      ( 02 Oct 2020 06:03 )             33.2   10-02    140  |  107  |  15  ----------------------------<  88  4.2   |  30  |  1.07    Ca    8.1<L>      02 Oct 2020 06:03        Physical Exam  General: AAOx3, No acute distress  Skin: No jaundice, no icterus  Abdomen: soft, nontender, nondistended  : Normal external genitalia, mac in place urine clear   Extremities: non edematous, no calf pain bilaterally

## 2020-10-02 NOTE — DISCHARGE NOTE PROVIDER - HOSPITAL COURSE
Patient underwent uncomplicated TURP on 10/1/2020.  CBI d/c POD #1, urine remained acceptable in color.  Pain well controlled, tolerating diet, ambulatory.  Successful TOV POD #1.  At the time of discharge, the patient was hemodynamically stable, was tolerating PO diet, was voiding urine and passing stool, was ambulating, and was comfortable with adequate pain control. The patient was instructed to follow up with Dr. Ayala within 1-2 weeks after discharge from the hospital. The patient/family felt comfortable with discharge. The patient was discharged to home. The patient had no other issues.

## 2020-10-02 NOTE — DISCHARGE NOTE PROVIDER - NSDCCPTREATMENT_GEN_ALL_CORE_FT
PRINCIPAL PROCEDURE  Procedure: Cystoscopy with transurethral resection of prostate (TURP)  Findings and Treatment: -After your surgery, it is common to have some blood in the urine.  The urine may appear pink or even red.  If you start to notice thick blood or many blood clots in the urine, call your physician.  Otherwise, drink a lot of fluids in order to dilute the urine well.  If you are unable to urinate or you feel abdominal fullness, sitting in a tub of warm water (sitz bath) may help; if not, call your physician.  -If you have a fever over 101F, call your physician.  -You may experience weakness after you go home; this is normal.  You will slowly regain your strength, during which time you may gradually increase your level of activity.  	Do not exercise, lift heavy objects, drive, or resume sexual activity until you are told to do so by your physician.  	You may walk around and even climb stairs.  	Do not allow yourself to become constipated, as straining may cause bleeding.  Take stool softeners (ex. Colace) or a laxative (ex. Senekot, ExLax) if needed.  -Most patients are discharged home with a few days of antibiotics.  Take these pills as instructed.  If you miss one dose, resume with the next dose and complete the entire course of therapy.  -After you arrive at home, call your Urologist’s office to arrange a follow-up appointment.

## 2020-10-02 NOTE — DISCHARGE NOTE PROVIDER - NSDCMRMEDTOKEN_GEN_ALL_CORE_FT
acetaminophen 325 mg oral tablet: 2 tab(s) orally every 6 hours, As needed, Mild Pain (1 - 3), Moderate Pain (4 - 6)  Augmentin 875 mg-125 mg oral tablet: 1 tab(s) orally every 12 hours   dilTIAZem 180 mg/24 hours oral capsule, extended release: 1 cap(s) orally once a day  levothyroxine 100 mcg (0.1 mg) oral tablet: 1 tab(s) orally once a day  meclizine 25 mg oral tablet: 1 tab(s) orally every 8 hours, As needed, for Dizziness  pantoprazole 40 mg oral granule, delayed release: 1 each orally once a day  tamsulosin 0.4 mg oral capsule: 2 cap(s) orally once a day   zolpidem 10 mg oral tablet: 1 tab(s) orally once a day (at bedtime), As needed, Insomnia

## 2020-10-02 NOTE — DISCHARGE NOTE PROVIDER - CARE PROVIDER_API CALL
Gus Ayala J  UROLOGY  [address], [address] [place] [address]  Phone: [phone]  Fax: [phone]  Follow Up Time: 1 week

## 2020-10-06 PROBLEM — Z87.898 PERSONAL HISTORY OF OTHER SPECIFIED CONDITIONS: Chronic | Status: ACTIVE | Noted: 2020-09-28

## 2020-10-06 PROBLEM — M19.90 UNSPECIFIED OSTEOARTHRITIS, UNSPECIFIED SITE: Chronic | Status: ACTIVE | Noted: 2020-09-28

## 2020-10-07 ENCOUNTER — APPOINTMENT (OUTPATIENT)
Dept: UROLOGY | Facility: CLINIC | Age: 85
End: 2020-10-07

## 2020-10-13 ENCOUNTER — APPOINTMENT (OUTPATIENT)
Dept: UROLOGY | Facility: CLINIC | Age: 85
End: 2020-10-13
Payer: MEDICARE

## 2020-10-13 PROCEDURE — 86901 BLOOD TYPING SEROLOGIC RH(D): CPT

## 2020-10-13 PROCEDURE — 88305 TISSUE EXAM BY PATHOLOGIST: CPT

## 2020-10-13 PROCEDURE — 99024 POSTOP FOLLOW-UP VISIT: CPT

## 2020-10-13 PROCEDURE — 80048 BASIC METABOLIC PNL TOTAL CA: CPT

## 2020-10-13 PROCEDURE — 85025 COMPLETE CBC W/AUTO DIFF WBC: CPT

## 2020-10-13 PROCEDURE — 86900 BLOOD TYPING SEROLOGIC ABO: CPT

## 2020-10-13 PROCEDURE — 36415 COLL VENOUS BLD VENIPUNCTURE: CPT

## 2020-10-13 PROCEDURE — 86850 RBC ANTIBODY SCREEN: CPT

## 2020-10-13 NOTE — ASSESSMENT
[FreeTextEntry1] : Very pleasant 85-year-old gentleman who presents for follow-up of BPH status post TURP\par -Reviewed results of pathology with the patient\par -Continue tamsulosin\par -Follow-up in 3 months or sooner if necessary

## 2020-10-13 NOTE — HISTORY OF PRESENT ILLNESS
[FreeTextEntry1] : Very pleasant 85-year-old gentleman who presents for follow-up of BPH status post TURP.  He feels well.  He reports that he is urinating well after the surgery.  No hematuria.  No dysuria.  No flank pain or suprapubic pain.  Pathology demonstrates 20 g of benign prostate tissue. [Urinary Retention] : urinary retention [Urinary Urgency] : no urinary urgency [Urinary Frequency] : urinary frequency [Straining] : no straining [Weak Stream] : no weak stream [Dysuria] : no dysuria [Hematuria - Gross] : no gross hematuria [Bladder Spasm] : no bladder spasm [Abdominal Pain] : no abdominal pain [Flank Pain] : no flank pain [Fever] : no fever [Fatigue] : no fatigue [Nausea] : no nausea [Anorexia] : no anorexia

## 2020-11-25 ENCOUNTER — INPATIENT (INPATIENT)
Facility: HOSPITAL | Age: 85
LOS: 2 days | Discharge: HOME | DRG: 312 | End: 2020-11-28
Attending: STUDENT IN AN ORGANIZED HEALTH CARE EDUCATION/TRAINING PROGRAM | Admitting: STUDENT IN AN ORGANIZED HEALTH CARE EDUCATION/TRAINING PROGRAM
Payer: COMMERCIAL

## 2020-11-25 VITALS
HEART RATE: 78 BPM | DIASTOLIC BLOOD PRESSURE: 82 MMHG | WEIGHT: 184.97 LBS | HEIGHT: 66 IN | OXYGEN SATURATION: 98 % | SYSTOLIC BLOOD PRESSURE: 135 MMHG | RESPIRATION RATE: 18 BRPM | TEMPERATURE: 97 F

## 2020-11-25 DIAGNOSIS — I95.1 ORTHOSTATIC HYPOTENSION: ICD-10-CM

## 2020-11-25 DIAGNOSIS — M95.0 ACQUIRED DEFORMITY OF NOSE: Chronic | ICD-10-CM

## 2020-11-25 DIAGNOSIS — R11.2 NAUSEA WITH VOMITING, UNSPECIFIED: ICD-10-CM

## 2020-11-25 DIAGNOSIS — R55 SYNCOPE AND COLLAPSE: ICD-10-CM

## 2020-11-25 DIAGNOSIS — Z90.49 ACQUIRED ABSENCE OF OTHER SPECIFIED PARTS OF DIGESTIVE TRACT: Chronic | ICD-10-CM

## 2020-11-25 DIAGNOSIS — Z29.9 ENCOUNTER FOR PROPHYLACTIC MEASURES, UNSPECIFIED: ICD-10-CM

## 2020-11-25 DIAGNOSIS — N40.0 BENIGN PROSTATIC HYPERPLASIA WITHOUT LOWER URINARY TRACT SYMPTOMS: ICD-10-CM

## 2020-11-25 DIAGNOSIS — E03.9 HYPOTHYROIDISM, UNSPECIFIED: ICD-10-CM

## 2020-11-25 DIAGNOSIS — Z98.890 OTHER SPECIFIED POSTPROCEDURAL STATES: Chronic | ICD-10-CM

## 2020-11-25 DIAGNOSIS — I10 ESSENTIAL (PRIMARY) HYPERTENSION: ICD-10-CM

## 2020-11-25 LAB
ALBUMIN SERPL ELPH-MCNC: 4 G/DL — SIGNIFICANT CHANGE UP (ref 3.5–5)
ALP SERPL-CCNC: 81 U/L — SIGNIFICANT CHANGE UP (ref 40–120)
ALT FLD-CCNC: 19 U/L DA — SIGNIFICANT CHANGE UP (ref 10–60)
ANION GAP SERPL CALC-SCNC: 8 MMOL/L — SIGNIFICANT CHANGE UP (ref 5–17)
AST SERPL-CCNC: 18 U/L — SIGNIFICANT CHANGE UP (ref 10–40)
BASOPHILS # BLD AUTO: 0.09 K/UL — SIGNIFICANT CHANGE UP (ref 0–0.2)
BASOPHILS NFR BLD AUTO: 0.7 % — SIGNIFICANT CHANGE UP (ref 0–2)
BILIRUB SERPL-MCNC: 0.8 MG/DL — SIGNIFICANT CHANGE UP (ref 0.2–1.2)
BUN SERPL-MCNC: 15 MG/DL — SIGNIFICANT CHANGE UP (ref 7–18)
CALCIUM SERPL-MCNC: 9.6 MG/DL — SIGNIFICANT CHANGE UP (ref 8.4–10.5)
CHLORIDE SERPL-SCNC: 103 MMOL/L — SIGNIFICANT CHANGE UP (ref 96–108)
CO2 SERPL-SCNC: 28 MMOL/L — SIGNIFICANT CHANGE UP (ref 22–31)
CREAT SERPL-MCNC: 1.16 MG/DL — SIGNIFICANT CHANGE UP (ref 0.5–1.3)
EOSINOPHIL # BLD AUTO: 0.12 K/UL — SIGNIFICANT CHANGE UP (ref 0–0.5)
EOSINOPHIL NFR BLD AUTO: 1 % — SIGNIFICANT CHANGE UP (ref 0–6)
ETHANOL SERPL-MCNC: <3 MG/DL — SIGNIFICANT CHANGE UP (ref 0–10)
GLUCOSE SERPL-MCNC: 127 MG/DL — HIGH (ref 70–99)
HCT VFR BLD CALC: 44.7 % — SIGNIFICANT CHANGE UP (ref 39–50)
HGB BLD-MCNC: 14.3 G/DL — SIGNIFICANT CHANGE UP (ref 13–17)
IMM GRANULOCYTES NFR BLD AUTO: 0.6 % — SIGNIFICANT CHANGE UP (ref 0–1.5)
LACTATE SERPL-SCNC: 1 MMOL/L — SIGNIFICANT CHANGE UP (ref 0.7–2)
LACTATE SERPL-SCNC: 2.1 MMOL/L — HIGH (ref 0.7–2)
LIDOCAIN IGE QN: 115 U/L — SIGNIFICANT CHANGE UP (ref 73–393)
LYMPHOCYTES # BLD AUTO: 0.86 K/UL — LOW (ref 1–3.3)
LYMPHOCYTES # BLD AUTO: 6.8 % — LOW (ref 13–44)
MAGNESIUM SERPL-MCNC: 2.2 MG/DL — SIGNIFICANT CHANGE UP (ref 1.6–2.6)
MCHC RBC-ENTMCNC: 32 GM/DL — SIGNIFICANT CHANGE UP (ref 32–36)
MCHC RBC-ENTMCNC: 32 PG — SIGNIFICANT CHANGE UP (ref 27–34)
MCV RBC AUTO: 100 FL — SIGNIFICANT CHANGE UP (ref 80–100)
MONOCYTES # BLD AUTO: 0.55 K/UL — SIGNIFICANT CHANGE UP (ref 0–0.9)
MONOCYTES NFR BLD AUTO: 4.4 % — SIGNIFICANT CHANGE UP (ref 2–14)
NEUTROPHILS # BLD AUTO: 10.88 K/UL — HIGH (ref 1.8–7.4)
NEUTROPHILS NFR BLD AUTO: 86.5 % — HIGH (ref 43–77)
NRBC # BLD: 0 /100 WBCS — SIGNIFICANT CHANGE UP (ref 0–0)
NT-PROBNP SERPL-SCNC: 263 PG/ML — SIGNIFICANT CHANGE UP (ref 0–450)
PLATELET # BLD AUTO: 210 K/UL — SIGNIFICANT CHANGE UP (ref 150–400)
POTASSIUM SERPL-MCNC: 4.3 MMOL/L — SIGNIFICANT CHANGE UP (ref 3.5–5.3)
POTASSIUM SERPL-SCNC: 4.3 MMOL/L — SIGNIFICANT CHANGE UP (ref 3.5–5.3)
PROT SERPL-MCNC: 7.8 G/DL — SIGNIFICANT CHANGE UP (ref 6–8.3)
RBC # BLD: 4.47 M/UL — SIGNIFICANT CHANGE UP (ref 4.2–5.8)
RBC # FLD: 12.8 % — SIGNIFICANT CHANGE UP (ref 10.3–14.5)
SARS-COV-2 RNA SPEC QL NAA+PROBE: SIGNIFICANT CHANGE UP
SODIUM SERPL-SCNC: 139 MMOL/L — SIGNIFICANT CHANGE UP (ref 135–145)
TROPONIN I SERPL-MCNC: <0.015 NG/ML — SIGNIFICANT CHANGE UP (ref 0–0.04)
TROPONIN I SERPL-MCNC: <0.015 NG/ML — SIGNIFICANT CHANGE UP (ref 0–0.04)
WBC # BLD: 12.57 K/UL — HIGH (ref 3.8–10.5)
WBC # FLD AUTO: 12.57 K/UL — HIGH (ref 3.8–10.5)

## 2020-11-25 PROCEDURE — 71045 X-RAY EXAM CHEST 1 VIEW: CPT | Mod: 26

## 2020-11-25 PROCEDURE — 74177 CT ABD & PELVIS W/CONTRAST: CPT | Mod: 26

## 2020-11-25 PROCEDURE — 99285 EMERGENCY DEPT VISIT HI MDM: CPT

## 2020-11-25 PROCEDURE — 99222 1ST HOSP IP/OBS MODERATE 55: CPT | Mod: AI,GC

## 2020-11-25 PROCEDURE — 70450 CT HEAD/BRAIN W/O DYE: CPT | Mod: 26

## 2020-11-25 PROCEDURE — 99223 1ST HOSP IP/OBS HIGH 75: CPT

## 2020-11-25 PROCEDURE — 71275 CT ANGIOGRAPHY CHEST: CPT | Mod: 26

## 2020-11-25 RX ORDER — SODIUM CHLORIDE 9 MG/ML
1000 INJECTION INTRAMUSCULAR; INTRAVENOUS; SUBCUTANEOUS ONCE
Refills: 0 | Status: COMPLETED | OUTPATIENT
Start: 2020-11-25 | End: 2020-11-25

## 2020-11-25 RX ORDER — ONDANSETRON 8 MG/1
4 TABLET, FILM COATED ORAL EVERY 4 HOURS
Refills: 0 | Status: DISCONTINUED | OUTPATIENT
Start: 2020-11-25 | End: 2020-11-28

## 2020-11-25 RX ORDER — MECLIZINE HCL 12.5 MG
25 TABLET ORAL EVERY 8 HOURS
Refills: 0 | Status: DISCONTINUED | OUTPATIENT
Start: 2020-11-25 | End: 2020-11-28

## 2020-11-25 RX ORDER — SODIUM CHLORIDE 9 MG/ML
1000 INJECTION INTRAMUSCULAR; INTRAVENOUS; SUBCUTANEOUS
Refills: 0 | Status: DISCONTINUED | OUTPATIENT
Start: 2020-11-25 | End: 2020-11-26

## 2020-11-25 RX ORDER — PANTOPRAZOLE SODIUM 20 MG/1
40 TABLET, DELAYED RELEASE ORAL
Refills: 0 | Status: DISCONTINUED | OUTPATIENT
Start: 2020-11-25 | End: 2020-11-28

## 2020-11-25 RX ORDER — DILTIAZEM HCL 120 MG
120 CAPSULE, EXT RELEASE 24 HR ORAL DAILY
Refills: 0 | Status: DISCONTINUED | OUTPATIENT
Start: 2020-11-25 | End: 2020-11-28

## 2020-11-25 RX ORDER — ZOLPIDEM TARTRATE 10 MG/1
5 TABLET ORAL AT BEDTIME
Refills: 0 | Status: DISCONTINUED | OUTPATIENT
Start: 2020-11-25 | End: 2020-11-28

## 2020-11-25 RX ORDER — ENOXAPARIN SODIUM 100 MG/ML
40 INJECTION SUBCUTANEOUS DAILY
Refills: 0 | Status: DISCONTINUED | OUTPATIENT
Start: 2020-11-25 | End: 2020-11-28

## 2020-11-25 RX ORDER — LEVOTHYROXINE SODIUM 125 MCG
100 TABLET ORAL DAILY
Refills: 0 | Status: DISCONTINUED | OUTPATIENT
Start: 2020-11-25 | End: 2020-11-28

## 2020-11-25 RX ADMIN — ONDANSETRON 4 MILLIGRAM(S): 8 TABLET, FILM COATED ORAL at 20:18

## 2020-11-25 RX ADMIN — SODIUM CHLORIDE 2000 MILLILITER(S): 9 INJECTION INTRAMUSCULAR; INTRAVENOUS; SUBCUTANEOUS at 17:46

## 2020-11-25 NOTE — ED PROVIDER NOTE - CLINICAL SUMMARY MEDICAL DECISION MAKING FREE TEXT BOX
85 yr old male with hx of cholecystectomy, ventral hernia, PE off xarelto, varicose vein, chronic back pain, herniated disc, BPH s/p surgery, renal cyst, insomnia, hypothyroid and HTN presents to ed c/o syncope pta.  [place] the mall shopping and while walking felt dizzy, sweats, nausea and then syncopized down on floor for maybe 1-2 mins. states incontinence of urine not bowel. pt ate around 11am and took his usual tylenol for chronic pain. +nausea and spitting up gastric yellow phlegm. no fever, no cough, no sob, no cp, no headache, no visual changes, no abd pain, no diarrhea, no focal weakness. pt denies any alcohol or drug use.    syncope could be due to transient hypoglycemia vs seizure vs arrhythmia vs lytes imbalance vs dehydration vs uti vs pna vs covid - labs, cardiac monitor, ekg, cxr, ct head, ua, admit.

## 2020-11-25 NOTE — ED PROVIDER NOTE - OBJECTIVE STATEMENT
85 yr old male with hx of cholecystectomy, ventral hernia, PE off xarelto, varicose vein, chronic back pain, herniated disc, BPH s/p surgery, renal cyst, insomnia, hypothyroid and HTN presents to ed c/o syncope pta.  pt was at the mall shopping and while walking felt dizzy, sweats, nausea and then syncopized down on floor for maybe 1-2 mins. states incontinence of urine not bowel. pt ate around 11am and took his usual tylenol for chronic pain. +nausea and spitting up gastric yellow phlegm. no fever, no cough, no sob, no cp, no headache, no visual changes, no abd pain, no diarrhea, no focal weakness. pt denies any alcohol or drug use.

## 2020-11-25 NOTE — ED ADULT NURSE NOTE - PMH
Arthritis    BPH (benign prostatic hypertrophy)    Chronic back pain    H/O dizziness    Herniated disc    Histoplasmosis    Hypertension    Hypothyroid    Insomnia    Other pulmonary embolism without acute cor pulmonale, unspecified chronicity  off Xarelto x 2 years  Renal cysts, acquired, bilateral    Varicose veins

## 2020-11-25 NOTE — H&P ADULT - NSHPPHYSICALEXAM_GEN_ALL_CORE
PHYSICAL EXAM:  GENERAL: NAD, lying in bed comfortably  HEAD:  Atraumatic, Normocephalic  EYES: EOMI, PERRLA, conjunctiva and sclera clear  ENT: Moist mucous membranes  NECK: Supple, No JVD  CHEST/LUNG: Clear to auscultation bilaterally; No rales, rhonchi, wheezing, or rubs. Unlabored respirations  HEART: Regular rate and rhythm; No murmurs, rubs, or gallops  ABDOMEN: Bowel sounds present; Soft, vague tenderness, Nondistended.   EXTREMITIES:  2+ Peripheral Pulses, brisk capillary refill. No clubbing, cyanosis, or edema  NERVOUS SYSTEM:  Alert & Oriented X3, speech clear. No deficits   MSK: FROM all 4 extremities, full and equal strength  SKIN: No rashes or lesions

## 2020-11-25 NOTE — ED PROVIDER NOTE - PROGRESS NOTE DETAILS
mcconnell: pt orthostatic +. labs reviewed mild lactate and wbc. otherwise neg work up  admit to med for syncope possibly dehydration but needs to r/o seizure vs arrhythmia.

## 2020-11-25 NOTE — ED ADULT NURSE NOTE - OBJECTIVE STATEMENT
Pt AOx4, ambulatory, [place] the mall. Pt states he was shopping when he felt dizzy and passed out., Pt p/w small hematoma to back of head no active bleeding noted. Pt currently coughing green/yellowish sputum, pt states this has been for a few days. Pt denies CP, SOB. EKG done, pt placed on cardiac monitor, no signs of distress noted. No neuro deficit noted. Pt endorses period of urinary incontinence during syncopal episode.

## 2020-11-25 NOTE — H&P ADULT - PROBLEM SELECTOR PLAN 2
patient has h/o dizziness and p/w syncope  found to be orthostatic + in ED  will c/w IVF  hold flomax as it can also contribute to orthostatic hypotension

## 2020-11-25 NOTE — H&P ADULT - NSICDXPASTSURGICALHX_GEN_ALL_CORE_FT
PAST SURGICAL HISTORY:  H/O hernia repair     H/O surgical biopsy lungs bilaterally    Nasal deviation repaired    S/P cholecystectomy

## 2020-11-25 NOTE — CONSULT NOTE ADULT - SUBJECTIVE AND OBJECTIVE BOX
NEUROLOGY CONSULT NOTE    NAME:  MP ALCAZAR      ASSESSMENT:  85 RHM with likely syncopal event in the setting of orthostatic hypotension, although the prolonged period of unconsciousness raises the concern for nonconvulsive seizure      RECOMMENDATIONS:      Syncope Workup and Management:    - Routine EEG to screen for seizure tendency    - Carotid Doppler to evaluate for vascular abnormalities       - If abnormal, consider CTA Head to evaluate for intracranial stenoses       - If normal, no further inpatient neurological workup needed    - Monitor orthostatic BP & HR with each vital signs check    - Cardiovascular workup: Telemetry, Echocardiogram, Cardiology follow-up    - Encourage plentiful fluid hydration    - Caution with rapid changes in position    - Meclizine PRN dizziness/vertigo    - If orthostatic hypotension does not improve with the above steps, consider adding midodrine 5mg PO TID          NOTE TO BE COMPLETED - PLEASE REFER TO ABOVE ONLY AND IGNORE INFORMATION BELOW    *******************************      CHIEF COMPLAINT:  Patient is a 85y old  Male who presents with a chief complaint of syncope (25 Nov 2020 19:57)      HPI:  Patient, an 85 year old male with PMH of cholecystectomy, ventral hernia, PE off xarelto, varicose vein, chronic back pain, herniated disc, BPH s/p surgery, renal cyst, insomnia, hypothyroid and HTN presents to the ED after syncopal episode. According to patient, he went shopping today when around 12 he syncopised and fell down. He reports feeling dizzy, having sweating before the episode. He had LOC which he thinks was for couple of minutes, also hit his head at the back. Denies confusion after the episode or tongue bite but he did have urinary incontinence. Patient says he has history of dizziness for which he takes meclizine prn but he has never passed out before. Since then, patient has been feeling nauseous and seen to be expectorating yellowish green phlegm in the ED. Denies any blood in vomitus. Denies headache, fevers, cough, SOB. Patient found to be orthostatic positive. He got 1L bolus in the ED. GOC: Discussed with the patient in detail and explained to him about CPR and resuscitation. Patient says he does not understand exactly and wants to consult with wife. Need to be readdressed by primary team. Remains FULL CODE for now. (25 Nov 2020 19:57)  In ED,   Vital Signs Last 24 Hrs  T(C): 36.4 (25 Nov 2020 19:27), Max: 36.4 (25 Nov 2020 19:27)  T(F): 97.5 (25 Nov 2020 19:27), Max: 97.5 (25 Nov 2020 19:27)  HR: 80 (25 Nov 2020 19:27) (78 - 80)  BP: 146/72 (25 Nov 2020 19:27) (135/82 - 146/72)  RR: 18 (25 Nov 2020 19:27) (18 - 18)  SpO2: 97% (25 Nov 2020 19:27) (97% - 98%)      NEURO HPI:  85 RHM with episode of dizziness and sweating, followed by fall and loss of consciousness for a few minutes, as well as trauma to the head. The patient has been found to have positive orthostatic vital signs.    PAST MEDICAL & SURGICAL HISTORY:  Arthritis  H/O dizziness  Varicose veins  Other pulmonary embolism without acute cor pulmonale, unspecified chronicity, off Xarelto x 2 years  Chronic back pain  Herniated disc  Histoplasmosis  BPH (benign prostatic hypertrophy)  Renal cysts, acquired, bilateral  Insomnia  Hypothyroidism  Hypertension  H/O hernia repair  S/P cholecystectomy  Nasal deviation, repaired  H/O surgical biopsy, lungs bilaterally        MEDICATIONS:  diltiazem    milliGRAM(s) Oral daily  enoxaparin Injectable 40 milliGRAM(s) SubCutaneous daily  levothyroxine 100 MICROGram(s) Oral daily  lidocaine   Patch 1 Patch Transdermal every 24 hours  meclizine 25 milliGRAM(s) Oral every 8 hours PRN  ondansetron Injectable 4 milliGRAM(s) IV Push every 4 hours PRN  pantoprazole    Tablet 40 milliGRAM(s) Oral before breakfast  sodium chloride 0.9%. 1000 milliLiter(s) IV Continuous <Continuous>  zolpidem 5 milliGRAM(s) Oral at bedtime PRN  zolpidem 5 milliGRAM(s) Oral at bedtime PRN      ALLERGIES:  No Known Allergies      FAMILY HISTORY:  FH: type 2 diabetes  father        SOCIAL HISTORY:  Denies alcohol, tobacco, and illicit drug use    REVIEW OF SYSTEMS:  GENERAL: No fever, weight changes, fatigue  EYES: No eye pain or discharge  EAR/NOSE/MOUTH/THROAT: No sinus or throat pain; No difficulty hearing  NECK: No pain or stiffness  RESPIRATORY: No cough, wheezing, chills, or hemoptysis  CARDIOVASCULAR: No chest pain, palpitations, shortness of breath, or dyspnea on exertion  GASTROINTESTINAL: No abdominal pain, nausea, vomiting, hematemesis, diarrhea, or constipation  GENITOURINARY: No dysuria, frequency, hematuria, or incontinence  SKIN: No rashes or lesions  ENDOCRINE: No heat or cold intolerance  HEMATOLOGIC: No easy bruising or bleeding  PSYCHIATRIC: No depression, anxiety, or mood swings  MUSCULOSKELETAL: No joint pain or swelling  NEUROLOGICAL: As per HPI      OBJECTIVE:    Vital Signs Last 24 Hrs  T(C): 36.6 (26 Nov 2020 00:17), Max: 36.6 (26 Nov 2020 00:17)  T(F): 97.9 (26 Nov 2020 00:17), Max: 97.9 (26 Nov 2020 00:17)  HR: 74 (26 Nov 2020 00:17) (74 - 80)  BP: 124/52 (26 Nov 2020 00:17) (124/52 - 146/72)  BP(mean): --  RR: 18 (25 Nov 2020 19:27) (18 - 18)  SpO2: 99% (26 Nov 2020 00:17) (97% - 99%)    General Examination:  General: No acute distress  HEENT: Atraumatic, Normocephalic  Respiratory: CTA B/l.  No crackles, rhonchi, or wheezes.  Cardiovascular: RRR.  Normal S1 & S2.  Normal b/l radial and pedal pulses.    Neurological Examination:  General / Mental Status: AAO x 3.  No aphasia or dysarthria.  Naming and repetition intact.  Cranial Nerves: VFF x 4.  PERRL.  EOMI x 2, No nystagmus or diplopia.  B/l V1-V3 equal and intact to light touch and pinprick.  Symmetric facial movement and palate elevation.  B/l hearing equal to finger rub.  5/5 strength with b/l sternocleidomastoid & trapezius.  Midline tongue protrusion, with no atrophy or fasciculations.  Motor: Normal bulk & tone in all four extremities.  5/5 strength throughout all four extremities.  No downward drift, rigidity, spasticity, or tremors in any of the four extremities.  Sensory: Intact to light touch and pinprick in all four extremities.  Negative Romberg.  Reflex: 2+ and symmetric at b/l biceps, triceps, brachioradialis, patellae, and ankles.  Downgoing toes b/l.  Coordination: No dysmetria with b/l finger-to-nose and heel raise tests.  Symmetric rapid alternating movements b/l.  Gait: Normal, narrow-based gait.  No difficulty with tiptoe, heel, and tandem gaits.        LABORATORY VALUES:                        14.3   12.57 )-----------( 210      ( 25 Nov 2020 16:39 )             44.7       11-25    139  |  103  |  15  ----------------------------<  127<H>  4.3   |  28  |  1.16    Ca    9.6      25 Nov 2020 16:39  Mg     2.2     11-25    TPro  7.8  /  Alb  4.0  /  TBili  0.8  /  DBili  x   /  AST  18  /  ALT  19  /  AlkPhos  81  11-25                            NEUROIMAGING:          Please contact the Neurology consult service with any questions.    Mitesh Gallego MD   of Neurology  [facility] NEUROLOGY CONSULT NOTE    NAME:  MP ALCAZAR      ASSESSMENT:  85 RHM with likely syncopal event in the setting of orthostatic hypotension, although the prolonged period of unconsciousness raises the concern for nonconvulsive seizure      RECOMMENDATIONS:    Syncope Workup and Management:    - Routine EEG to screen for seizure tendency    - Carotid Doppler to evaluate for vascular abnormalities       - If abnormal, consider CTA Head to evaluate for intracranial stenoses       - If normal, no further inpatient neurological workup needed    - Monitor orthostatic BP & HR with each vital signs check    - Cardiovascular workup: Telemetry, Echocardiogram, Cardiology follow-up    - Encourage plentiful fluid hydration    - Caution with rapid changes in position    - Meclizine PRN dizziness/vertigo    - If orthostatic hypotension does not improve with the above steps, consider adding midodrine 5mg PO TID          *******************************      CHIEF COMPLAINT:  Patient is a 85y old  Male who presents with a chief complaint of syncope (25 Nov 2020 19:57)      HPI:  Patient, an 85 year old male with PMH of cholecystectomy, ventral hernia, PE off xarelto, varicose vein, chronic back pain, herniated disc, BPH s/p surgery, renal cyst, insomnia, hypothyroid and HTN presents to the ED after syncopal episode. According to patient, he went shopping today when around 12 he syncopised and fell down. He reports feeling dizzy, having sweating before the episode. He had LOC which he thinks was for couple of minutes, also hit his head at the back. Denies confusion after the episode or tongue bite but he did have urinary incontinence. Patient says he has history of dizziness for which he takes meclizine prn but he has never passed out before. Since then, patient has been feeling nauseous and seen to be expectorating yellowish green phlegm in the ED. Denies any blood in vomitus. Denies headache, fevers, cough, SOB. Patient found to be orthostatic positive. He got 1L bolus in the ED. GOC: Discussed with the patient in detail and explained to him about CPR and resuscitation. Patient says he does not understand exactly and wants to consult with wife. Need to be readdressed by primary team. Remains FULL CODE for now. (25 Nov 2020 19:57)  In ED,   Vital Signs Last 24 Hrs  T(C): 36.4 (25 Nov 2020 19:27), Max: 36.4 (25 Nov 2020 19:27)  T(F): 97.5 (25 Nov 2020 19:27), Max: 97.5 (25 Nov 2020 19:27)  HR: 80 (25 Nov 2020 19:27) (78 - 80)  BP: 146/72 (25 Nov 2020 19:27) (135/82 - 146/72)  RR: 18 (25 Nov 2020 19:27) (18 - 18)  SpO2: 97% (25 Nov 2020 19:27) (97% - 98%)      NEURO HPI:  85 RHM with episode of dizziness and sweating, followed by fall and loss of consciousness for a few minutes, as well as trauma to the head. The patient has been found to have positive orthostatic vital signs.    PAST MEDICAL & SURGICAL HISTORY:  Arthritis  H/O dizziness  Varicose veins  Other pulmonary embolism without acute cor pulmonale, unspecified chronicity, off Xarelto x 2 years  Chronic back pain  Herniated disc  Histoplasmosis  BPH (benign prostatic hypertrophy)  Renal cysts, acquired, bilateral  Insomnia  Hypothyroidism  Hypertension  H/O hernia repair  S/P cholecystectomy  Nasal deviation, repaired  H/O surgical biopsy, lungs bilaterally      MEDICATIONS:  diltiazem    milliGRAM(s) Oral daily  enoxaparin Injectable 40 milliGRAM(s) SubCutaneous daily  levothyroxine 100 MICROGram(s) Oral daily  lidocaine   Patch 1 Patch Transdermal every 24 hours  meclizine 25 milliGRAM(s) Oral every 8 hours PRN  ondansetron Injectable 4 milliGRAM(s) IV Push every 4 hours PRN  pantoprazole    Tablet 40 milliGRAM(s) Oral before breakfast  sodium chloride 0.9%. 1000 milliLiter(s) IV Continuous <Continuous>  zolpidem 5 milliGRAM(s) Oral at bedtime PRN  zolpidem 5 milliGRAM(s) Oral at bedtime PRN    ALLERGIES:  No Known Allergies    FAMILY HISTORY:  FH: type 2 diabetes (father)    SOCIAL HISTORY:  Denies alcohol, tobacco, and illicit drug use    REVIEW OF SYSTEMS:  GENERAL: No fever, weight changes  EYES: No eye pain or discharge  EAR/NOSE/MOUTH/THROAT: No sinus or throat pain  NECK: No pain or stiffness  RESPIRATORY: No cough, wheezing, chills, or hemoptysis  CARDIOVASCULAR: No chest pain, palpitations, shortness of breath, or dyspnea on exertion  GASTROINTESTINAL: No abdominal pain, nausea, vomiting, hematemesis, diarrhea, or constipation  GENITOURINARY: No dysuria, frequency, hematuria, or incontinence  SKIN: No rashes or lesions  ENDOCRINE: No heat or cold intolerance  HEMATOLOGIC: No easy bruising or bleeding  PSYCHIATRIC: No depression, anxiety, or mood swings  MUSCULOSKELETAL: No joint pain or swelling  NEUROLOGICAL: As per HPI        OBJECTIVE:    Vital Signs Last 24 Hrs  T(C): 36.6 (26 Nov 2020 00:17), Max: 36.6 (26 Nov 2020 00:17)  T(F): 97.9 (26 Nov 2020 00:17), Max: 97.9 (26 Nov 2020 00:17)  HR: 74 (26 Nov 2020 00:17) (74 - 80)  BP: 124/52 (26 Nov 2020 00:17) (124/52 - 146/72)  RR: 18 (25 Nov 2020 19:27) (18 - 18)  SpO2: 99% (26 Nov 2020 00:17) (97% - 99%)    General Examination:  General: No acute distress  HEENT: Atraumatic, Normocephalic  Respiratory: CTA B/l.  No crackles, rhonchi, or wheezes.  Cardiovascular: RRR.  Normal S1 & S2.  Normal b/l radial and pedal pulses.    Neurological Examination:  General / Mental Status: AAO x 3.  No aphasia or dysarthria.  Naming and repetition intact.  Cranial Nerves: VFF x 4.  PERRL.  EOMI x 2, No nystagmus or diplopia.  B/l V1-V3 equal and intact to light touch and pinprick.  Symmetric facial movement and palate elevation.  B/l hearing equal to finger rub.  5/5 strength with b/l sternocleidomastoid & trapezius.  Midline tongue protrusion, with no atrophy or fasciculations.  Motor: Normal bulk & tone in all four extremities.  5/5 strength throughout all four extremities.  No downward drift, rigidity, spasticity, or tremors in any of the four extremities.  Sensory: Intact to light touch and pinprick in all four extremities.  Negative Romberg.  Reflex: 2+ and symmetric at b/l biceps, triceps, brachioradialis, patellae, and ankles.  Downgoing toes b/l.  Coordination: No dysmetria with b/l finger-to-nose and heel raise tests.  Symmetric rapid alternating movements b/l.  Gait: Normal, narrow-based gait.  No difficulty with tiptoe, heel, and tandem gaits.        LABORATORY VALUES:                        14.3   12.57 )-----------( 210      ( 25 Nov 2020 16:39 )             44.7       11-25    139  |  103  |  15  ----------------------------<  127<H>  4.3   |  28  |  1.16    Ca    9.6      25 Nov 2020 16:39  Mg     2.2     11-25    TPro  7.8  /  Alb  4.0  /  TBili  0.8  /  DBili  x   /  AST  18  /  ALT  19  /  AlkPhos  81  11-25          NEUROIMAGING:      CT Head (11/25/20):  - No acute intracranial abnormality  - Chronic microvascular disease  - Mild diffuse atrophy          Please contact the Neurology consult service with any questions.    Mitesh Gallego MD   of Neurology  [facility]

## 2020-11-25 NOTE — ED ADULT TRIAGE NOTE - CHIEF COMPLAINT QUOTE
BIBA s/p syncopal episode X 3 in store falling hitting back of head small hematoma. A&OX3 patient states" he became dizzy while in store causing him to fall , has has previous episode

## 2020-11-25 NOTE — H&P ADULT - PROBLEM SELECTOR PLAN 5
patient is on flomax at home  will hold for now as patient is orthostatic positive patient is on flomax at home  bladder scan in ED showed 110ml  will hold for now as patient is orthostatic positive

## 2020-11-25 NOTE — ED PROVIDER NOTE - NEUROLOGICAL, MLM
Alert and oriented, no focal deficits, no motor or sensory deficits. Cn II-XII intact, normal finger to nose testing, non-ataxic gait/

## 2020-11-25 NOTE — H&P ADULT - PROBLEM SELECTOR PLAN 3
patient has been feeling nauseous and expectorating phlegm that is yellowish-green  CXR shows history of right middle lobectomy   patient also has h/o cholecystectomy  will get CTA chest and abdomen

## 2020-11-25 NOTE — H&P ADULT - HISTORY OF PRESENT ILLNESS
Patient, an 85 year old male with PMH of cholecystectomy, ventral hernia, PE off xarelto, varicose vein, chronic back pain, herniated disc, BPH s/p surgery, renal cyst, insomnia, hypothyroid and HTN presents to the ED after syncopal episode. According to patient, he went shopping today when around 12 he syncopised and fell down. He reports feeling dizzy, having sweating before the episode. He had LOC which he thinks was for couple of minutes, also hit his head at the back. Denies confusion after the episode or tongue bite but he did have urinary incontinence. Patient says he has history of dizziness for which he takes meclizine prn but he has never passed out before. Since then, patient has been feeling nauseous and seen to be expectorating yellowish green phlegm in the ED. Denies any blood in vomitus. Denies headache, fevers, cough, SOB. Patient, an 85 year old male with PMH of cholecystectomy, ventral hernia, PE off xarelto, varicose vein, chronic back pain, herniated disc, BPH s/p surgery, renal cyst, insomnia, hypothyroid and HTN presents to the ED after syncopal episode. According to patient, he went shopping today when around 12 he syncopised and fell down. He reports feeling dizzy, having sweating before the episode. He had LOC which he thinks was for couple of minutes, also hit his head at the back. Denies confusion after the episode or tongue bite but he did have urinary incontinence. Patient says he has history of dizziness for which he takes meclizine prn but he has never passed out before. Since then, patient has been feeling nauseous and seen to be expectorating yellowish green phlegm in the ED. Denies any blood in vomitus. Denies headache, fevers, cough, SOB.    In ED,   Vital Signs Last 24 Hrs  T(C): 36.4 (25 Nov 2020 19:27), Max: 36.4 (25 Nov 2020 19:27)  T(F): 97.5 (25 Nov 2020 19:27), Max: 97.5 (25 Nov 2020 19:27)  HR: 80 (25 Nov 2020 19:27) (78 - 80)  BP: 146/72 (25 Nov 2020 19:27) (135/82 - 146/72)  BP(mean): --  RR: 18 (25 Nov 2020 19:27) (18 - 18)  SpO2: 97% (25 Nov 2020 19:27) (97% - 98%)    Patient found to be orthostatic positive. He got 1L bolus in the ED.    GOC: Discussed with the patient in detail and explained to him about CPR and resuscitation. Patient says he does not understand exactly and wants to consult with wife. Need to be readdressed by primary team. Remains FULL CODE for now.

## 2020-11-25 NOTE — H&P ADULT - ATTENDING COMMENTS
Patient was interviewed and examined in the ED and discussed with Dr. Waldrop.     He presented after a witnessed syncope at the mass.  His wife was present.  He was not noted to have a seizure, but was incontinent of urine.   He fell backwards and now has a bump on his head.   PMH:  recent TURP at [facility], previous cholecystectomy and hernia repair, UTI, dizziness.  Possible history of PE, was on DOAC, now stopped.  ROS +  nausea.  He is expectorating yellow sputum.     Alert, cooperative man, in NAD  Vital Signs Last 24 Hrs  T(C): 36.4 (25 Nov 2020 19:27), Max: 36.4 (25 Nov 2020 19:27)  T(F): 97.5 (25 Nov 2020 19:27), Max: 97.5 (25 Nov 2020 19:27)  HR: 80 (25 Nov 2020 19:27) (78 - 80)  BP: 146/72 (25 Nov 2020 19:27) (135/82 - 146/72)  BP(mean): --  RR: 18 (25 Nov 2020 19:27) (18 - 18)  SpO2: 97% (25 Nov 2020 19:27) (97% - 98%)  Lungs, bilateral air entry  Cor, RRR  Abdomen, soft  Neurological, alert, non-focal exam  EKG NSR, 1st degree A-V block, prolonged QT, occasional PVC  r< from: CT Head No Cont (11.25.20 @ 16:59) >    Redemonstration volume loss, microvascular disease, no acute hemorrhage or midline shift. If symptoms persist consider follow-up head CT or MR if no contraindications.    < end of copied text >    < from: Xray Chest 1 View AP/PA (11.25.20 @ 16:51) >    INTERPRETATION:  AP chest on November 25, 2020 at 4:39 PM. Patient has chest pain.    Heart is likely enlarged.    Clips over the right lower lung field again noted. Clips in the right upper quadrant seen.    There is an irregular density area again seen in the right midlung field. There is history of right middle lobectomy likely accounting for present findings.    Chest is similar to October 11, 2019.    IMPRESSION: Postoperative changes right lung again noted.    < end of copied text >    IMP:  Syncope, cardiac arrhythmia is likely.  PE needs to be excluded, in a patient with a previous history of PE and s/p lobectomy. r/o ACS.            Orthostatic BP changes, likely related to alpha blocker, possibly to Antivert. Patient appears euvolemic.           Urinary incontinence, r/o retention, which is unlikely after TURP, but increased pressure of syncope may have precipitated incontinence post               TURP.  Seizure is unlikely.  Bladder scan shows 107 ml-- no urinary retention.           Head trauma after syncope.  He is alert and CT is negative--no evidence of subdural or epidural hematoma.           Abnormal CXR and expectoration.  Evidence of lobectomy.  Possible PE vs pneumonia.  No evidence of sepsis          Nausea and expectoration. r/o pneumonia, r/o ileus, obstruction.   Plan: Tele/troponin/Echo/Cardiology--Dr. Petty          CT angio chest/abdomen           Taper antihypertensives. IV hydration.

## 2020-11-25 NOTE — H&P ADULT - PROBLEM SELECTOR PLAN 4
patient takes diltiazem 180mg at home  patient took his dose today and bp in ED was WNL  will start on a lower dose for now  monitor bp

## 2020-11-25 NOTE — H&P ADULT - PROBLEM SELECTOR PLAN 1
patient presented after a syncopal episode with fall, LOC and head trauma   CTH showed no hemorrhage  EKG showed NSR with 1st degree HB   patient found to be orthostatic positive in the ED  s/p 1l bolus in the ED  will c/w IVF patient presented after a syncopal episode with fall, LOC and head trauma   CTH showed no hemorrhage  EKG showed NSR with 1st degree HB   lactate 2.1  patient found to be orthostatic positive in the ED  s/p 1l bolus in the ED  Trop x 1 negative  will c/w IVF  admit to tele  follow troponins  as patient has syncope has h/o PE, also get CTA to r/o PE though low suspicion  neuro consulted Dr Gallego  Cardio consulted Dr Godoy

## 2020-11-25 NOTE — ED PROVIDER NOTE - CARDIAC, MLM
Normal rate, regular rhythm.  Heart sounds S1, S2.  No murmurs, rubs or gallops. equal radial pulse 2+

## 2020-11-25 NOTE — ED ADULT NURSE REASSESSMENT NOTE - NS ED NURSE REASSESS COMMENT FT1
received pt awake alert not in acute distress,with saline lock intact,no redness,no swelling noted,hooked to cardiac monitor,waiting for telebed.

## 2020-11-25 NOTE — ED ADULT NURSE NOTE - NSIMPLEMENTINTERV_GEN_ALL_ED
Implemented All Fall with Harm Risk Interventions:  [place] to call system. Call bell, personal items and telephone within reach. Instruct patient to call for assistance. Room bathroom lighting operational. Non-slip footwear when patient is off stretcher. Physically safe environment: no spills, clutter or unnecessary equipment. Stretcher in lowest position, wheels locked, appropriate side rails in place. Provide visual cue, wrist band, yellow gown, etc. Monitor gait and stability. Monitor for mental status changes and reorient to person, place, and time. Review medications for side effects contributing to fall risk. Reinforce activity limits and safety measures with patient and family. Provide visual clues: red socks.

## 2020-11-25 NOTE — ED PROVIDER NOTE - CONSTITUTIONAL, MLM
normal... Well appearing, awake, alert, oriented to person, place, time/situation and in no apparent distress. elderly

## 2020-11-25 NOTE — H&P ADULT - PROBLEM SELECTOR PLAN 7
[ ] Previous VTE                                    3  [ ] Thrombophilia                                  2  [ ] Lower limb paralysis                        2  (unable to hold up >15 seconds)    [ ] Current Cancer (within 6 months)        2   [ ] Immobilization > 24 hrs                    1  [ ] ICU/CCU stay > 24 hrs                      1  [x] Age > 60                                         1  Score:1   hold ppx as patient came with fall  SCDs

## 2020-11-26 DIAGNOSIS — N39.0 URINARY TRACT INFECTION, SITE NOT SPECIFIED: ICD-10-CM

## 2020-11-26 LAB
24R-OH-CALCIDIOL SERPL-MCNC: 29.4 NG/ML — LOW (ref 30–80)
A1C WITH ESTIMATED AVERAGE GLUCOSE RESULT: 5.1 % — SIGNIFICANT CHANGE UP (ref 4–5.6)
ALBUMIN SERPL ELPH-MCNC: 2.9 G/DL — LOW (ref 3.5–5)
ALP SERPL-CCNC: 64 U/L — SIGNIFICANT CHANGE UP (ref 40–120)
ALT FLD-CCNC: 14 U/L DA — SIGNIFICANT CHANGE UP (ref 10–60)
ANION GAP SERPL CALC-SCNC: 6 MMOL/L — SIGNIFICANT CHANGE UP (ref 5–17)
APPEARANCE UR: CLEAR — SIGNIFICANT CHANGE UP
AST SERPL-CCNC: 13 U/L — SIGNIFICANT CHANGE UP (ref 10–40)
BILIRUB SERPL-MCNC: 0.7 MG/DL — SIGNIFICANT CHANGE UP (ref 0.2–1.2)
BILIRUB UR-MCNC: NEGATIVE — SIGNIFICANT CHANGE UP
BUN SERPL-MCNC: 13 MG/DL — SIGNIFICANT CHANGE UP (ref 7–18)
CALCIUM SERPL-MCNC: 8.5 MG/DL — SIGNIFICANT CHANGE UP (ref 8.4–10.5)
CHLORIDE SERPL-SCNC: 106 MMOL/L — SIGNIFICANT CHANGE UP (ref 96–108)
CHOLEST SERPL-MCNC: 105 MG/DL — SIGNIFICANT CHANGE UP
CO2 SERPL-SCNC: 27 MMOL/L — SIGNIFICANT CHANGE UP (ref 22–31)
COLOR SPEC: YELLOW — SIGNIFICANT CHANGE UP
CREAT SERPL-MCNC: 0.96 MG/DL — SIGNIFICANT CHANGE UP (ref 0.5–1.3)
DIFF PNL FLD: ABNORMAL
ERYTHROCYTE [SEDIMENTATION RATE] IN BLOOD: 15 MM/HR — SIGNIFICANT CHANGE UP (ref 0–20)
ESTIMATED AVERAGE GLUCOSE: 100 MG/DL — SIGNIFICANT CHANGE UP (ref 68–114)
FOLATE SERPL-MCNC: 8 NG/ML — SIGNIFICANT CHANGE UP
GLUCOSE SERPL-MCNC: 79 MG/DL — SIGNIFICANT CHANGE UP (ref 70–99)
GLUCOSE UR QL: NEGATIVE — SIGNIFICANT CHANGE UP
HCT VFR BLD CALC: 39.2 % — SIGNIFICANT CHANGE UP (ref 39–50)
HDLC SERPL-MCNC: 34 MG/DL — LOW
HGB BLD-MCNC: 12.3 G/DL — LOW (ref 13–17)
KETONES UR-MCNC: NEGATIVE — SIGNIFICANT CHANGE UP
LEUKOCYTE ESTERASE UR-ACNC: ABNORMAL
LIPID PNL WITH DIRECT LDL SERPL: 57 MG/DL — SIGNIFICANT CHANGE UP
MAGNESIUM SERPL-MCNC: 2.2 MG/DL — SIGNIFICANT CHANGE UP (ref 1.6–2.6)
MCHC RBC-ENTMCNC: 31.3 PG — SIGNIFICANT CHANGE UP (ref 27–34)
MCHC RBC-ENTMCNC: 31.4 GM/DL — LOW (ref 32–36)
MCV RBC AUTO: 99.7 FL — SIGNIFICANT CHANGE UP (ref 80–100)
NITRITE UR-MCNC: NEGATIVE — SIGNIFICANT CHANGE UP
NON HDL CHOLESTEROL: 71 MG/DL — SIGNIFICANT CHANGE UP
NRBC # BLD: 0 /100 WBCS — SIGNIFICANT CHANGE UP (ref 0–0)
PH UR: 5 — SIGNIFICANT CHANGE UP (ref 5–8)
PHOSPHATE SERPL-MCNC: 3.6 MG/DL — SIGNIFICANT CHANGE UP (ref 2.5–4.5)
PLATELET # BLD AUTO: 204 K/UL — SIGNIFICANT CHANGE UP (ref 150–400)
POTASSIUM SERPL-MCNC: 4.1 MMOL/L — SIGNIFICANT CHANGE UP (ref 3.5–5.3)
POTASSIUM SERPL-SCNC: 4.1 MMOL/L — SIGNIFICANT CHANGE UP (ref 3.5–5.3)
PROT SERPL-MCNC: 5.9 G/DL — LOW (ref 6–8.3)
PROT UR-MCNC: 100
RBC # BLD: 3.93 M/UL — LOW (ref 4.2–5.8)
RBC # FLD: 13 % — SIGNIFICANT CHANGE UP (ref 10.3–14.5)
SARS-COV-2 IGG SERPL QL IA: NEGATIVE — SIGNIFICANT CHANGE UP
SARS-COV-2 IGM SERPL IA-ACNC: 0.07 INDEX — SIGNIFICANT CHANGE UP
SODIUM SERPL-SCNC: 139 MMOL/L — SIGNIFICANT CHANGE UP (ref 135–145)
SP GR SPEC: 1.01 — SIGNIFICANT CHANGE UP (ref 1.01–1.02)
TRIGL SERPL-MCNC: 68 MG/DL — SIGNIFICANT CHANGE UP
TSH SERPL-MCNC: 2.83 UU/ML — SIGNIFICANT CHANGE UP (ref 0.34–4.82)
UROBILINOGEN FLD QL: NEGATIVE — SIGNIFICANT CHANGE UP
VIT B12 SERPL-MCNC: 388 PG/ML — SIGNIFICANT CHANGE UP (ref 232–1245)
WBC # BLD: 9.33 K/UL — SIGNIFICANT CHANGE UP (ref 3.8–10.5)
WBC # FLD AUTO: 9.33 K/UL — SIGNIFICANT CHANGE UP (ref 3.8–10.5)

## 2020-11-26 PROCEDURE — 93880 EXTRACRANIAL BILAT STUDY: CPT | Mod: 26

## 2020-11-26 PROCEDURE — 99232 SBSQ HOSP IP/OBS MODERATE 35: CPT | Mod: GC

## 2020-11-26 RX ORDER — CEFAZOLIN SODIUM 1 G
1000 VIAL (EA) INJECTION EVERY 8 HOURS
Refills: 0 | Status: DISCONTINUED | OUTPATIENT
Start: 2020-11-26 | End: 2020-11-28

## 2020-11-26 RX ORDER — LIDOCAINE 4 G/100G
1 CREAM TOPICAL ONCE
Refills: 0 | Status: COMPLETED | OUTPATIENT
Start: 2020-11-26 | End: 2020-11-26

## 2020-11-26 RX ORDER — MEROPENEM 1 G/30ML
1000 INJECTION INTRAVENOUS EVERY 8 HOURS
Refills: 0 | Status: DISCONTINUED | OUTPATIENT
Start: 2020-11-26 | End: 2020-11-26

## 2020-11-26 RX ORDER — MEROPENEM 1 G/30ML
INJECTION INTRAVENOUS
Refills: 0 | Status: DISCONTINUED | OUTPATIENT
Start: 2020-11-26 | End: 2020-11-26

## 2020-11-26 RX ORDER — CEFTRIAXONE 500 MG/1
1000 INJECTION, POWDER, FOR SOLUTION INTRAMUSCULAR; INTRAVENOUS EVERY 24 HOURS
Refills: 0 | Status: DISCONTINUED | OUTPATIENT
Start: 2020-11-26 | End: 2020-11-26

## 2020-11-26 RX ORDER — LIDOCAINE 4 G/100G
1 CREAM TOPICAL EVERY 24 HOURS
Refills: 0 | Status: DISCONTINUED | OUTPATIENT
Start: 2020-11-26 | End: 2020-11-28

## 2020-11-26 RX ORDER — SODIUM CHLORIDE 9 MG/ML
1000 INJECTION INTRAMUSCULAR; INTRAVENOUS; SUBCUTANEOUS
Refills: 0 | Status: DISCONTINUED | OUTPATIENT
Start: 2020-11-26 | End: 2020-11-27

## 2020-11-26 RX ORDER — CEFAZOLIN SODIUM 1 G
500 VIAL (EA) INJECTION EVERY 8 HOURS
Refills: 0 | Status: DISCONTINUED | OUTPATIENT
Start: 2020-11-26 | End: 2020-11-26

## 2020-11-26 RX ORDER — MEROPENEM 1 G/30ML
1000 INJECTION INTRAVENOUS ONCE
Refills: 0 | Status: COMPLETED | OUTPATIENT
Start: 2020-11-26 | End: 2020-11-26

## 2020-11-26 RX ORDER — ZOLPIDEM TARTRATE 10 MG/1
5 TABLET ORAL ONCE
Refills: 0 | Status: DISCONTINUED | OUTPATIENT
Start: 2020-11-26 | End: 2020-11-27

## 2020-11-26 RX ADMIN — ZOLPIDEM TARTRATE 5 MILLIGRAM(S): 10 TABLET ORAL at 00:26

## 2020-11-26 RX ADMIN — LIDOCAINE 1 PATCH: 4 CREAM TOPICAL at 02:10

## 2020-11-26 RX ADMIN — SODIUM CHLORIDE 75 MILLILITER(S): 9 INJECTION INTRAMUSCULAR; INTRAVENOUS; SUBCUTANEOUS at 12:19

## 2020-11-26 RX ADMIN — PANTOPRAZOLE SODIUM 40 MILLIGRAM(S): 20 TABLET, DELAYED RELEASE ORAL at 06:11

## 2020-11-26 RX ADMIN — SODIUM CHLORIDE 100 MILLILITER(S): 9 INJECTION INTRAMUSCULAR; INTRAVENOUS; SUBCUTANEOUS at 00:48

## 2020-11-26 RX ADMIN — LIDOCAINE 1 PATCH: 4 CREAM TOPICAL at 23:38

## 2020-11-26 RX ADMIN — LIDOCAINE 1 PATCH: 4 CREAM TOPICAL at 13:18

## 2020-11-26 RX ADMIN — MEROPENEM 100 MILLIGRAM(S): 1 INJECTION INTRAVENOUS at 12:08

## 2020-11-26 RX ADMIN — Medication 100 MICROGRAM(S): at 06:11

## 2020-11-26 RX ADMIN — Medication 100 MILLIGRAM(S): at 21:38

## 2020-11-26 RX ADMIN — LIDOCAINE 1 PATCH: 4 CREAM TOPICAL at 07:25

## 2020-11-26 RX ADMIN — ENOXAPARIN SODIUM 40 MILLIGRAM(S): 100 INJECTION SUBCUTANEOUS at 11:03

## 2020-11-26 NOTE — PROGRESS NOTE ADULT - ASSESSMENT
85 year old male with PMH of cholecystectomy, ventral hernia, PE off xarelto, varicose vein, chronic back pain, herniated disc, BPH s/p surgery, renal cyst, insomnia, hypothyroid and HTN presents to the ED after syncopal episode.   Patient admitted to tele for syncope.

## 2020-11-26 NOTE — PROGRESS NOTE ADULT - PROBLEM SELECTOR PLAN 4
patient takes diltiazem 180mg at home  patient took his dose today and bp in ED was WNL  will start on a lower dose for now  monitor bp On admission nausea and emesis x 1, no new events   CXR shows history of right middle lobectomy   Patient also has h/o cholecystectomy  CT chest and abdomen/pelvis showed Appearance of the bowel in keeping with a diarrheal illness, correlate for symptoms of gastroenteritis. Borderline thickening of the distal thoracic esophagus, new from the prior, correlate for gastroesophageal reflux or recent emesis. No acute pulmonary embolus. Patient takes diltiazem 180mg at home  C/w lower dose for now  Monitor BP Patient takes diltiazem 180mg at home  C/w low dose for now  Monitor BP

## 2020-11-26 NOTE — CONSULT NOTE ADULT - SUBJECTIVE AND OBJECTIVE BOX
UROLOGY CONSULT NOTE    chief complaint of syncope      HPI:   85 year old male with PMH of cholecystectomy, ventral hernia, PE off xarelto, varicose vein, chronic back pain, herniated disc, BPH s/p surgery, renal cyst, insomnia, hypothyroid and HTN presents to the ED after syncopal episode. According to patient, he went shopping today when around 12 he syncopised and fell down. He reports feeling dizzy, having sweating before the episode. He had LOC which he thinks was for couple of minutes, also hit his head at the back. Denies confusion after the episode or tongue bite but he did have urinary incontinence.  Denies any blood in vomitus. Denies headache, fevers, cough, SOB.    Urology was consulted because Pt is s/p TURP done 10/01/2020 with no f/up appointment with Dr Ayala and he request to be seen because he has developed phimosis post procedure , able to retract the foreskin but notes it is painful and reddened. No open wound or swelling. Denies any other complaints at this time .     In ED,   Vital Signs Last 24 Hrs  T(C): 36.4 (2020 19:27), Max: 36.4 (2020 19:27)  T(F): 97.5 (2020 19:27), Max: 97.5 (2020 19:27)  HR: 80 (2020 19:27) (78 - 80)  BP: 146/72 (2020 19:27) (135/82 - 146/72)  BP(mean): --  RR: 18 (2020 19:27) (18 - 18)  SpO2: 97% (2020 19:27) (97% - 98%)      PAST MEDICAL & SURGICAL HISTORY:  Arthritis    H/O dizziness    Varicose veins    Other pulmonary embolism without acute cor pulmonale, unspecified chronicity  off Xarelto x 2 years    Chronic back pain    Herniated disc    Histoplasmosis    BPH (benign prostatic hypertrophy)    Renal cysts, acquired, bilateral    Insomnia    Hypothyroid    Hypertension    H/O hernia repair    S/P cholecystectomy    Nasal deviation  repaired    H/O surgical biopsy  lungs bilaterally        Review of Systems:    I have reviewed 9 systems with the patient and the only positive findings were     MEDICATIONS  (STANDING):  diltiazem    milliGRAM(s) Oral daily  enoxaparin Injectable 40 milliGRAM(s) SubCutaneous daily  levothyroxine 100 MICROGram(s) Oral daily  lidocaine   Patch 1 Patch Transdermal every 24 hours  pantoprazole    Tablet 40 milliGRAM(s) Oral before breakfast  sodium chloride 0.9%. 1000 milliLiter(s) (75 mL/Hr) IV Continuous <Continuous>    MEDICATIONS  (PRN):  meclizine 25 milliGRAM(s) Oral every 8 hours PRN Dizziness  ondansetron Injectable 4 milliGRAM(s) IV Push every 4 hours PRN Nausea and/or Vomiting  zolpidem 5 milliGRAM(s) Oral at bedtime PRN Insomnia  zolpidem 5 milliGRAM(s) Oral at bedtime PRN Insomnia      Allergies    No Known Allergies    Intolerances        Social History:  denies smoking, alcohol, other drugs  lives with wife at home (2020 19:57)      FAMILY HISTORY:  FH: type 2 diabetes  father        Vital Signs Last 24 Hrs  T(C): 36.2 (2020 15:46), Max: 36.8 (2020 04:30)  T(F): 97.2 (2020 15:46), Max: 98.2 (2020 04:30)  HR: 68 (2020 15:46) (59 - 80)  BP: 129/59 (2020 15:46) (103/46 - 146/72)  BP(mean): --  RR: 18 (2020 15:46) (17 - 18)  SpO2: 96% (2020 15:46) (95% - 99%)    Physical Exam:  Vital Signs Last 24 Hrs  T(C): 36.2 (2020 15:46), Max: 36.8 (2020 04:30)  T(F): 97.2 (2020 15:46), Max: 98.2 (2020 04:30)  HR: 68 (2020 15:46) (59 - 80)  BP: 129/59 (2020 15:46) (103/46 - 146/72)  BP(mean): --  RR: 18 (2020 15:46) (17 - 18)  SpO2: 96% (2020 15:46) (95% - 99%)    General:  A&Ox3,Appears stated age, No acute distress,  Head: NC/AT  EENT: PERRLA. EOMI. Conjunctiva and sclera clear. Pharynx clear.  Neck: Supple. No JVD  Lungs: CTA B/l. Nonlabored Respirations  CV: +S1S2, RRR  Abdomen: Soft, Nondistended,  Nontender, no guarding, no rebound   : mild erythema around the penile meatus , no ulcer, lesion or edematous scrotum. No torsion  Extremities: Warm and well perfused. 2+ peripheral pulses b/l. Calf soft, nontender b/l. No pedal edema.            LABS:                        12.3   9.33  )-----------( 204      ( 2020 07:31 )             39.2         139  |  106  |  13  ----------------------------<  79  4.1   |  27  |  0.96    Ca    8.5      2020 07:31  Phos  3.6       Mg     2.2         TPro  5.9<L>  /  Alb  2.9<L>  /  TBili  0.7  /  DBili  x   /  AST  13  /  ALT  14  /  AlkPhos  64      LIVER FUNCTIONS - ( 2020 07:31 )  Alb: 2.9 g/dL / Pro: 5.9 g/dL / ALK PHOS: 64 U/L / ALT: 14 U/L DA / AST: 13 U/L / GGT: x             Urinalysis Basic - ( 2020 09:46 )    Color: Yellow / Appearance: Clear / S.010 / pH: x  Gluc: x / Ketone: Negative  / Bili: Negative / Urobili: Negative   Blood: x / Protein: 100 / Nitrite: Negative   Leuk Esterase: Small / RBC: 5-10 /HPF / WBC 11-25 /HPF   Sq Epi: x / Non Sq Epi: x / Bacteria: x        RADIOLOGY & ADDITIONAL STUDIES:  < from: CT Abdomen and Pelvis w/ IV Cont (20 @ 23:28) >  FINDINGS:    CHEST:    LUNGS AND LARGE AIRWAYS: Redemonstrated partially calcified pulmonary parenchymal masses, which date back to the most remote study performed in . Stable prominent reticular markings in the left upper lobe. Right middle lobectomy. Appearance is unchanged dating back to .  PLEURA: No pleural effusion or pneumothorax.  VESSELS: Pulmonary arteries are opacified to the segmental level. No acute thromboembolus. Main pulmonary artery is mildly enlarged. Thoracic aorta normal in caliber without dissection. Redemonstrated pedunculated atheroma in the proximal descending thoracic aorta,, unchanged.  HEART: Heart size is normal.Mild circumferential pericardial effusion, unchanged.  MEDIASTINUM AND BIENVENIDO: Distal thoracic esophagus appears mildly thickened, new from the prior. No enlarged mediastinal or hilar lymph node measuring greater than 10 mm in short axis. Small amount of fluid in the pericardial recesses.  CHEST WALL AND LOWER NECK: Unremarkable  BONES: No acute fracture    ABDOMEN AND PELVIS:    LIVER: Calcified granulomata.  BILE DUCTS: Normal caliber.  GALLBLADDER: Surgically absent  SPLEEN: Calcified granulomata  PANCREAS: Within normal limits.  ADRENALS: Stable 1.4 cm right adrenal nodule and nodular thickening of the left adrenal gland.  KIDNEYS/URETERS: Bilateral parapelvic cysts. No hydronephrosis. Parenchymal calcification in the midpole left kidney. Scarring in the lower pole left kidney.    BLADDER: Decompressed, limiting evaluation  REPRODUCTIVE ORGANS: Enlarged prostate    BOWEL: Fluid-filled small bowel with areas of asymmetric distention, however there is no discrete transition. Focal area of apparent mild mural thickening in the ileum, for example on coronal series 19, images 38-40, nonspecific. Intraluminal fluid is present throughout the large bowel and rectum.. Diverticulosis coli. Short appendix, is unremarkable.  PERITONEUM: No free air or ascites  VESSELS:  Atherosclerotic change of the abdominal aorta without aneurysm. Prominent noncalcified plaque at the origin of the right renal artery, results in short segment significant stenosis.  RETROPERITONEUM: No hemorrhage or enlarged lymph node measuring greater than 10 mm in short axis  ABDOMINAL WALL: Intact  BONES: No acute osseous abnormality      IMPRESSION:  1. Appearance of the bowel in keeping with a diarrheal illness, correlate for symptoms of gastroenteritis.  2. Borderline thickening of the distal thoracic esophagus, new from the prior, correlate for gastroesophageal reflux or recent emesis.  3. No acute pulmonary embolus.  4. Multiple chronic findings, detailed above, unchanged from 2016.      < end of copied text >

## 2020-11-26 NOTE — PROGRESS NOTE ADULT - PROBLEM SELECTOR PLAN 6
patient takes levothyroxine 100mcg at home  continue with home med  follow TSH Patient is on Flomax at home  bladder scan in ED showed 110ml  Continue to hold for now  F/u repeated bladder scan  Urology consulted for mild swelling on glans and difficulty retracting Patient is on Flomax at home  bladder scan in ED showed 110 cc  Continue to hold for now  Repeated bladder scan 50 cc  Urology consulted for mild swelling on glans and difficulty retracting Patient takes levothyroxine 100mcg at home  continue with home med  TSH 2.8

## 2020-11-26 NOTE — PROGRESS NOTE ADULT - PROBLEM SELECTOR PLAN 3
patient has been feeling nauseous and expectorating phlegm that is yellowish-green  CXR shows history of right middle lobectomy   patient also has h/o cholecystectomy  will get CTA chest and abdomen As per chart review on september 2020 patient had E. coli ESBL on UCX  Started on Meropenem  ID Dr Hightower consulted On admission nausea and emesis x 1, no new events   CXR shows history of right middle lobectomy   Patient also has h/o cholecystectomy  CT chest and abdomen/pelvis showed Appearance of the bowel in keeping with a diarrheal illness, correlate for symptoms of gastroenteritis. Borderline thickening of the distal thoracic esophagus, new from the prior, correlate for gastroesophageal reflux or recent emesis. No acute pulmonary embolus.

## 2020-11-26 NOTE — PROGRESS NOTE ADULT - PROBLEM SELECTOR PLAN 1
patient presented after a syncopal episode with fall, LOC and head trauma   CTH showed no hemorrhage  EKG showed NSR with 1st degree HB   lactate 2.1  patient found to be orthostatic positive in the ED  s/p 1l bolus in the ED  Trop x 1 negative  will c/w IVF  admit to tele  follow troponins  as patient has syncope has h/o PE, also get CTA to r/o PE though low suspicion  neuro consulted Dr Gallego  Cardio consulted Dr Godoy Patient presented after a syncopal episode with fall, LOC and head trauma   CTH showed no hemorrhage  EKG showed NSR with 1st degree HB   Lactate 2.1  Orthostatic positive in the ED  s/p 1l bolus in the ED  Trop x 1 negative  C/w IVF  admit to tele  Troponin x 2 negative   CTA chest -ve for PE  Neuro Dr Gallego  Cardio Dr Godoy Patient presented after a syncopal episode with fall, LOC and head trauma   CTH showed no hemorrhage  EKG showed NSR with 1st degree HB   Lactate 2.1  Orthostatic positive in the ED  s/p 1l bolus in the ED  Trop x 1 negative  C/w IVF  admit to tele  Troponin x 2 negative   CTA chest -ve for PE  UA positive, no signs of sirs, afebrile, no leucocytosis  Neuro Dr Gallego  Cardio Dr Godoy Patient presented after a syncopal episode with fall, LOC and head trauma   CTH showed no hemorrhage  EKG showed NSR with 1st degree HB   Lactate 2.1  Orthostatic positive in the ED  S/p 1l bolus in the ED  Trop x 2 negative  C/w maintenance IVF  C/w tele  Troponin x 2 negative   CTA chest -ve for PE  UA positive, no signs of sirs, afebrile, no leucocytosis  F/u EEG, bilateral carotid doppler, Echo  C/w Meclizine prn for dizziness   Neuro Dr Gallego  Cardio Dr Godoy

## 2020-11-26 NOTE — CONSULT NOTE ADULT - ASSESSMENT
85 year old male with PMH of cholecystectomy, ventral hernia, PE off xarelto, varicose vein, chronic back pain, herniated disc, BPH s/p surgery, renal cyst, insomnia, hypothyroid and HTN presents to the ED after syncopal episode. According to patient, he went shopping today when around 12 he syncopised and fell down. He reports feeling dizzy, having sweating before the episode. He had LOC which he thinks was for couple of minutes, also hit his head at the back. Denies confusion after the episode or tongue bite but he did have urinary incontinence.  Denies any blood in vomitus. Denies headache, fevers, cough, SOB.    Urology was consulted because Pt is s/p TURP done 10/01/2020 with no f/up appointment with Dr Ayala and he request to be seen because he has developed phimosis post procedure , able to retract the foreskin but notes it is painful and reddened. No open wound or swelling. Denies any other complaints at this time .       INCOMPLETE 85 year old male with PMH of cholecystectomy, ventral hernia, PE off xarelto, varicose vein, chronic back pain, herniated disc, BPH s/p surgery, renal cyst, insomnia, hypothyroid and HTN presents to the ED after syncopal episode.  Urology was consulted because Pt is s/p TURP done 10/01/2020 with no f/up appointment with Dr Ayala and he request to be seen because he has developed phimosis post procedure , able to retract the foreskin but notes it is painful and reddened. No open wound or swelling. Pt with cellulitis around the penile foreskin    Care as per primary team   ABx- Ancef   Pain/Nausea mgmt  f/up labs   Urology will follow      85 year old male with PMH of cholecystectomy, ventral hernia, PE off xarelto, varicose vein, chronic back pain, herniated disc, BPH s/p surgery, renal cyst, insomnia, hypothyroid and HTN presents to the ED after syncopal episode.  Urology was consulted because Pt is s/p TURP done 10/01/2020 with no f/up appointment with Dr Ayala and he request to be seen because he has developed phimosis post procedure , able to retract the foreskin but notes it is painful and reddened. No open wound or swelling. Pt with cellulitis around the penile foreskin    Care as per primary team   ABx- Ancef   lotrisone tropical bid  Pain/Nausea mgmt  f/up labs   Urology will follow

## 2020-11-26 NOTE — CONSULT NOTE ADULT - SUBJECTIVE AND OBJECTIVE BOX
DATE OF SERVICE: 11/26/2020 Patient was seen and examined ,interim events noted.Consultant notes ,Labs,Telemetry reviewed by me    PRESENTING CC:Syncope     HPI and HOSPITAL COURSE: HPI:  Patient, an 85 year old male with PMH of cholecystectomy, ventral hernia, PE off xarelto, varicose vein, chronic back pain, herniated disc, BPH s/p surgery, renal cyst, insomnia, hypothyroid and HTN presents to the ED after syncopal episode. According to patient, he went shopping today when around 12 he syncopised and fell down. He reports feeling dizzy, having sweating before the episode. He had LOC which he thinks was for couple of minutes, also hit his head at the back. Denies confusion after the episode or tongue bite but he did have urinary incontinence. Patient says he has history of dizziness for which he takes meclizine prn but he has never passed out before. Since then, patient has been feeling nauseous and seen to be expectorating yellowish green phlegm in the ED. Denies any blood in vomitus. Denies headache, fevers, cough, SOB.    In ED,   Vital Signs Last 24 Hrs  T(C): 36.4 (25 Nov 2020 19:27), Max: 36.4 (25 Nov 2020 19:27)  T(F): 97.5 (25 Nov 2020 19:27), Max: 97.5 (25 Nov 2020 19:27)  HR: 80 (25 Nov 2020 19:27) (78 - 80)  BP: 146/72 (25 Nov 2020 19:27) (135/82 - 146/72)  BP(mean): --  RR: 18 (25 Nov 2020 19:27) (18 - 18)  SpO2: 97% (25 Nov 2020 19:27) (97% - 98%)    Patient found to be orthostatic positive. He got 1L bolus in the ED.    GOC: Discussed with the patient in detail and explained to him about CPR and resuscitation. Patient says he does not understand exactly and wants to consult with wife. Need to be readdressed by primary team. Remains FULL CODE for now. (25 Nov 2020 19:57)      INTERIM EVENTS:Awake      PMH -reviewed admission note, no change since admission  Heart failure: Acute [ ] Chronic [ ] Acute on chronic [ ] Diastolic [ ] Systolic [ ] Combined systolic and diastolic[ ]  TRACI[ ]  ATN[ ]  CKD I [ ] CKDII [ ] CKD III [ ] CKD IV [ ] CKD V [ ] ESRD[ ]    MEDICATIONS  (STANDING):  diltiazem    milliGRAM(s) Oral daily  enoxaparin Injectable 40 milliGRAM(s) SubCutaneous daily  levothyroxine 100 MICROGram(s) Oral daily  lidocaine   Patch 1 Patch Transdermal every 24 hours  pantoprazole    Tablet 40 milliGRAM(s) Oral before breakfast  sodium chloride 0.9%. 1000 milliLiter(s) (100 mL/Hr) IV Continuous <Continuous>    MEDICATIONS  (PRN):  meclizine 25 milliGRAM(s) Oral every 8 hours PRN Dizziness  ondansetron Injectable 4 milliGRAM(s) IV Push every 4 hours PRN Nausea and/or Vomiting  zolpidem 5 milliGRAM(s) Oral at bedtime PRN Insomnia  zolpidem 5 milliGRAM(s) Oral at bedtime PRN Insomnia              REVIEW OF SYSTEMS:  Constitutional: [ ] fever, [ ]weight loss,  [x ]fatigue  Eyes: [ ] visual changes  Respiratory: [ ]shortness of breath;  [ ] cough, [ ]wheezing, [ ]chills, [ ]hemoptysis  Cardiovascular: [ ] chest pain, [ ]palpitations, [ ]dizziness,  [ ]leg swelling[ ]orthopnea[ ]PND  Gastrointestinal: [ ] abdominal pain, [ ]nausea, [ ]vomiting,  [ ]diarrhea   Genitourinary: [ ] dysuria, [ ] hematuria  Neurologic: [ ] headaches [ ] tremors[ ]weakness  Skin: [ ] itching, [ ]burning, [ ] rashes  Endocrine: [ ] heat or cold intolerance  Musculoskeletal: [ ] joint pain or swelling; [ ] muscle, back, or extremity pain  Psychiatric: [ ] depression, [ ]anxiety, [ ]mood swings, or [ ]difficulty sleeping  Hematologic: [ ] easy bruising, [ ] bleeding gums    [x] All remaining systems negative except as per above.   [ ]Unable to obtain.    Vital Signs Last 24 Hrs  T(C): 36.5 (26 Nov 2020 07:09), Max: 36.8 (26 Nov 2020 04:30)  T(F): 97.7 (26 Nov 2020 07:09), Max: 98.2 (26 Nov 2020 04:30)  HR: 59 (26 Nov 2020 07:09) (59 - 80)  BP: 118/46 (26 Nov 2020 07:09) (103/46 - 146/72)  RR: 17 (26 Nov 2020 07:09) (17 - 18)  SpO2: 95% (26 Nov 2020 07:09) (95% - 99%)  I&O's Summary      PHYSICAL EXAM:  General: No acute distress BMI-  HEENT: EOMI, PERRL  Neck: Supple, [ ] JVD  Lungs: Equal air entry bilaterally; [ ] rales [ ] wheezing [ ] rhonchi  Heart: Regular rate and rhythm; [ ] murmur   /6 [ ] systolic [ ] diastolic [ ] radiation[ ] rubs [ ]  gallops  Abdomen: Nontender, bowel sounds present  Extremities: No clubbing, cyanosis, [ ] edema  Nervous system:  Alert & Oriented X3, no focal deficits  Psychiatric: Normal affect  Skin: No rashes or lesions    LABS:  11-26    139  |  106  |  13  ----------------------------<  79  4.1   |  27  |  0.96    Ca    8.5      26 Nov 2020 07:31  Phos  3.6     11-26  Mg     2.2     11-26    TPro  5.9<L>  /  Alb  2.9<L>  /  TBili  0.7  /  DBili  x   /  AST  13  /  ALT  14  /  AlkPhos  64  11-26    Creatinine Trend: 0.96<--, 1.16<--                        12.3   9.33  )-----------( 204      ( 26 Nov 2020 07:31 )             39.2         Cardiac Enzymes: CARDIAC MARKERS ( 25 Nov 2020 22:49 )  <0.015 ng/mL / x     / x     / x     / x      CARDIAC MARKERS ( 25 Nov 2020 16:39 )  <0.015 ng/mL / x     / x     / x     / x          Serum Pro-Brain Natriuretic Peptide: 263 pg/mL (11-25-20 @ 16:39)        RADIOLOGY:    ECG [my interpretation]:    TELEMETRY:    ECHO:    STRESS TEST:    CATHETERIZATION:      IMPRESSION AND PLAN:

## 2020-11-26 NOTE — CONSULT NOTE ADULT - ATTENDING COMMENTS
Patient was seen and examined by me on 11/26/2020,interim events noted,labs and radiology studies reviewed.  Fili Godoy MD,FACC.  [address] [place] [address].  [id number].  [phone]
I counseled the patient about his differential diagnosis, and the further testing indicated for workup.

## 2020-11-26 NOTE — PROGRESS NOTE ADULT - PROBLEM SELECTOR PLAN 2
patient has h/o dizziness and p/w syncope  found to be orthostatic + in ED  will c/w IVF  hold flomax as it can also contribute to orthostatic hypotension patient has h/o dizziness and p/w syncope  Orthostatic positive in ED  C/w IVF 75 cc/hr  Hold Flomax as it can also contribute to orthostatic hypotension  Repeated Orthostatics positive patient has h/o dizziness and p/w syncope  Orthostatic positive in ED  Hold Flomax as it can also contribute to orthostatic hypotension  Repeated Orthostatics positive  C/w IVF 75 cc/hr

## 2020-11-26 NOTE — PROGRESS NOTE ADULT - PROBLEM SELECTOR PLAN 5
patient is on flomax at home  bladder scan in ED showed 110ml  will hold for now as patient is orthostatic positive Patient takes diltiazem 180mg at home  C/w lower dose for now  Monitor BP Patient is on Flomax at home  bladder scan in ED showed 110 cc  Continue to hold for now  Repeated bladder scan 50 cc  Urology consulted for mild swelling on glans and difficulty retracting Patient is on Flomax at home  bladder scan in ED showed 110 cc  Continue to hold for now due to orthostatic hypotension.   Repeated bladder scan 50 cc  Urology consulted for mild swelling on glans and difficulty retracting Patient is on Flomax at home  Bladder scan in ED showed 110 cc  Continue to hold for now due to orthostatic hypotension.   Repeated bladder scan 50 cc  Urology consulted for mild difficulty to retract foreskin, painful and mild redness

## 2020-11-26 NOTE — PROGRESS NOTE ADULT - PROBLEM SELECTOR PLAN 7
[ ] Previous VTE                                    3  [ ] Thrombophilia                                  2  [ ] Lower limb paralysis                        2  (unable to hold up >15 seconds)    [ ] Current Cancer (within 6 months)        2   [ ] Immobilization > 24 hrs                    1  [ ] ICU/CCU stay > 24 hrs                      1  [x] Age > 60                                         1  Score:1   hold ppx as patient came with fall  SCDs Patient takes levothyroxine 100mcg at home  continue with home med  TSH 2.8

## 2020-11-26 NOTE — PROGRESS NOTE ADULT - SUBJECTIVE AND OBJECTIVE BOX
PGY-1 Progress Note discussed with attending    PAGER #: [[phone]] TILL 5:00 PM  PLEASE CONTACT ON CALL TEAM:  - On Call Team (Please refer to Rosita) FROM 5:00 PM - 8:30PM  - Nightfloat Team FROM 8:30 -7:30 AM    CHIEF COMPLAINT & BRIEF HOSPITAL COURSE:  Patient, an 85 year old male with PMH of cholecystectomy, ventral hernia, PE off Xarelto varicose vein, chronic back pain, herniated disc, BPH s/p surgery, renal cyst, insomnia, hypothyroid and HTN presents to the ED after syncopal episode. According to patient, he went shopping today when around 12 he synopsized and fell down. He reports feeling dizzy, having sweating before the episode. He had LOC which he thinks was for couple of minutes, also hit his head at the back. Denies confusion after the episode or tongue bite but he did have urinary incontinence. Patient says he has history of dizziness for which he takes meclizine prn but he has never passed out before. Since then, patient has been feeling nauseous and seen to be expectorating yellowish green phlegm in the ED. Denies any blood in vomitus. Denies headache, fevers, cough, SOB.    Patient found to be orthostatic positive. He received 1L bolus in the ED.    GOC: Full code     INTERVAL HPI/OVERNIGHT EVENTS: patient refers he is feeling fine, mild pain on the back of his head. Patient denies new episodes of nausea vomiting or diarrhea.     MEDICATIONS  (STANDING):  cefTRIAXone   IVPB 1000 milliGRAM(s) IV Intermittent every 24 hours  diltiazem    milliGRAM(s) Oral daily  enoxaparin Injectable 40 milliGRAM(s) SubCutaneous daily  levothyroxine 100 MICROGram(s) Oral daily  lidocaine   Patch 1 Patch Transdermal every 24 hours  pantoprazole    Tablet 40 milliGRAM(s) Oral before breakfast  sodium chloride 0.9%. 1000 milliLiter(s) (100 mL/Hr) IV Continuous <Continuous>    MEDICATIONS  (PRN):  meclizine 25 milliGRAM(s) Oral every 8 hours PRN Dizziness  ondansetron Injectable 4 milliGRAM(s) IV Push every 4 hours PRN Nausea and/or Vomiting  zolpidem 5 milliGRAM(s) Oral at bedtime PRN Insomnia  zolpidem 5 milliGRAM(s) Oral at bedtime PRN Insomnia      Vital Signs Last 24 Hrs  T(C): 36.5 (26 Nov 2020 07:09), Max: 36.8 (26 Nov 2020 04:30)  T(F): 97.7 (26 Nov 2020 07:09), Max: 98.2 (26 Nov 2020 04:30)  HR: 59 (26 Nov 2020 07:09) (59 - 80)  BP: 118/46 (26 Nov 2020 07:09) (103/46 - 146/72)  BP(mean): --  RR: 17 (26 Nov 2020 07:09) (17 - 18)  SpO2: 95% (26 Nov 2020 07:09) (95% - 99%)    PHYSICAL EXAMINATION:  GENERAL: NAD, overweight   HEAD:  swelling on right side over back of head, Normocephalic  EYES:  conjunctiva and sclera clear  NECK: Supple, No JVD, Normal thyroid  CHEST/LUNG: Clear to auscultation. Clear to percussion bilaterally; No rales, rhonchi, wheezing, or rubs  HEART: Regular rate and rhythm; No murmurs, rubs, or gallops  ABDOMEN: Soft, Nontender, Nondistended; Bowel sounds present, no pain or masses on palpation   NERVOUS SYSTEM:  Alert & Oriented X3, calmed     EXTREMITIES:  2+ Peripheral Pulses, No clubbing, cyanosis, or edema  SKIN: warm dry                          12.3   9.33  )-----------( 204      ( 26 Nov 2020 07:31 )             39.2     11-26    139  |  106  |  13  ----------------------------<  79  4.1   |  27  |  0.96    Ca    8.5      26 Nov 2020 07:31  Phos  3.6     11-26  Mg     2.2     11-26    TPro  5.9<L>  /  Alb  2.9<L>  /  TBili  0.7  /  DBili  x   /  AST  13  /  ALT  14  /  AlkPhos  64  11-26    LIVER FUNCTIONS - ( 26 Nov 2020 07:31 )  Alb: 2.9 g/dL / Pro: 5.9 g/dL / ALK PHOS: 64 U/L / ALT: 14 U/L DA / AST: 13 U/L / GGT: x           CARDIAC MARKERS ( 25 Nov 2020 22:49 )  <0.015 ng/mL / x     / x     / x     / x      CARDIAC MARKERS ( 25 Nov 2020 16:39 )  <0.015 ng/mL / x     / x     / x     / x                               PGY-1 Progress Note discussed with attending    PAGER #: [[phone]] TILL 5:00 PM  PLEASE CONTACT ON CALL TEAM:  - On Call Team (Please refer to Rosita) FROM 5:00 PM - 8:30PM  - Nightfloat Team FROM 8:30 -7:30 AM    CHIEF COMPLAINT & BRIEF HOSPITAL COURSE:  Patient, an 85 year old male with PMH of cholecystectomy, ventral hernia, PE off Xarelto varicose vein, chronic back pain, herniated disc, BPH s/p surgery, renal cyst, insomnia, hypothyroid and HTN presents to the ED after syncopal episode. According to patient, he went shopping today when around 12 he synopsized and fell down. He reports feeling dizzy, having sweating before the episode. He had LOC which he thinks was for couple of minutes, also hit his head at the back. Denies confusion after the episode or tongue bite but he did have urinary incontinence. Patient says he has history of dizziness for which he takes meclizine prn but he has never passed out before. Since then, patient has been feeling nauseous and seen to be expectorating yellowish green phlegm in the ED. Denies any blood in vomitus. Denies headache, fevers, cough, SOB.    Patient found to be orthostatic positive. He received 1L bolus in the ED.    GOC: Full code     INTERVAL HPI/OVERNIGHT EVENTS: patient refers he is feeling fine, mild pain on the back of his head. Patient denies new episodes of nausea vomiting or diarrhea. He also refers mild discomfort on his penis when urinating.     MEDICATIONS  (STANDING):  cefTRIAXone   IVPB 1000 milliGRAM(s) IV Intermittent every 24 hours  diltiazem    milliGRAM(s) Oral daily  enoxaparin Injectable 40 milliGRAM(s) SubCutaneous daily  levothyroxine 100 MICROGram(s) Oral daily  lidocaine   Patch 1 Patch Transdermal every 24 hours  pantoprazole    Tablet 40 milliGRAM(s) Oral before breakfast  sodium chloride 0.9%. 1000 milliLiter(s) (100 mL/Hr) IV Continuous <Continuous>    MEDICATIONS  (PRN):  meclizine 25 milliGRAM(s) Oral every 8 hours PRN Dizziness  ondansetron Injectable 4 milliGRAM(s) IV Push every 4 hours PRN Nausea and/or Vomiting  zolpidem 5 milliGRAM(s) Oral at bedtime PRN Insomnia  zolpidem 5 milliGRAM(s) Oral at bedtime PRN Insomnia      Vital Signs Last 24 Hrs  T(C): 36.5 (26 Nov 2020 07:09), Max: 36.8 (26 Nov 2020 04:30)  T(F): 97.7 (26 Nov 2020 07:09), Max: 98.2 (26 Nov 2020 04:30)  HR: 59 (26 Nov 2020 07:09) (59 - 80)  BP: 118/46 (26 Nov 2020 07:09) (103/46 - 146/72)  BP(mean): --  RR: 17 (26 Nov 2020 07:09) (17 - 18)  SpO2: 95% (26 Nov 2020 07:09) (95% - 99%)    PHYSICAL EXAMINATION:  GENERAL: NAD, overweight   HEAD:  swelling on right side over back of head, Normocephalic  EYES:  conjunctiva and sclera clear  NECK: Supple, No JVD, Normal thyroid  CHEST/LUNG: Clear to auscultation. Clear to percussion bilaterally; No rales, rhonchi, wheezing, or rubs  HEART: Regular rate and rhythm; No murmurs, rubs, or gallops  ABDOMEN: Soft, Nontender, Nondistended; Bowel sounds present, no pain or masses on palpation   NERVOUS SYSTEM:  Alert & Oriented X3, calmed     : voiding with some degree of difficulty, mild swelling on glans and difficult retraction   EXTREMITIES:  2+ Peripheral Pulses, No clubbing, cyanosis, or edema  SKIN: warm dry                          12.3   9.33  )-----------( 204      ( 26 Nov 2020 07:31 )             39.2     11-26    139  |  106  |  13  ----------------------------<  79  4.1   |  27  |  0.96    Ca    8.5      26 Nov 2020 07:31  Phos  3.6     11-26  Mg     2.2     11-26    TPro  5.9<L>  /  Alb  2.9<L>  /  TBili  0.7  /  DBili  x   /  AST  13  /  ALT  14  /  AlkPhos  64  11-26    LIVER FUNCTIONS - ( 26 Nov 2020 07:31 )  Alb: 2.9 g/dL / Pro: 5.9 g/dL / ALK PHOS: 64 U/L / ALT: 14 U/L DA / AST: 13 U/L / GGT: x           CARDIAC MARKERS ( 25 Nov 2020 22:49 )  <0.015 ng/mL / x     / x     / x     / x      CARDIAC MARKERS ( 25 Nov 2020 16:39 )  <0.015 ng/mL / x     / x     / x     / x                               PGY-1 Progress Note discussed with attending    PAGER #: [[phone]] TILL 5:00 PM  PLEASE CONTACT ON CALL TEAM:  - On Call Team (Please refer to Rosita) FROM 5:00 PM - 8:30PM  - Nightfloat Team FROM 8:30 -7:30 AM    CHIEF COMPLAINT & BRIEF HOSPITAL COURSE:  Patient, an 85 year old male with PMH of cholecystectomy, ventral hernia, PE off Xarelto, varicose vein, chronic back pain, herniated disc, BPH s/p surgery, renal cyst, insomnia, hypothyroid and HTN presents to the ED after syncopal episode. According to patient, he went shopping an he suddenly passed out. He reports feeling dizzy and sweating before the episode. He had LOC for a couple of minutes and also hit his head at the back. Denies confusion after the episode or tongue biting but he did have urinary incontinence. Patient says he has history of dizziness for which he takes meclizine prn but he has never passed out before. Patient has been feeling nauseous and expectorating yellowish green phlegm in the ED. Denies any blood in vomitus. Denies headache, fevers, cough, SOB.  Patient found to be orthostatic positive. He received 1L bolus in the ED.  GOC: Full code     INTERVAL HPI/OVERNIGHT EVENTS: patient refers he is feeling fine, mild pain on the back of his head. Patient denies new episodes of nausea vomiting or diarrhea. He also refers mild discomfort on his penis when urinating.     MEDICATIONS  (STANDING):  cefTRIAXone   IVPB 1000 milliGRAM(s) IV Intermittent every 24 hours  diltiazem    milliGRAM(s) Oral daily  enoxaparin Injectable 40 milliGRAM(s) SubCutaneous daily  levothyroxine 100 MICROGram(s) Oral daily  lidocaine   Patch 1 Patch Transdermal every 24 hours  pantoprazole    Tablet 40 milliGRAM(s) Oral before breakfast  sodium chloride 0.9%. 1000 milliLiter(s) (100 mL/Hr) IV Continuous <Continuous>    MEDICATIONS  (PRN):  meclizine 25 milliGRAM(s) Oral every 8 hours PRN Dizziness  ondansetron Injectable 4 milliGRAM(s) IV Push every 4 hours PRN Nausea and/or Vomiting  zolpidem 5 milliGRAM(s) Oral at bedtime PRN Insomnia  zolpidem 5 milliGRAM(s) Oral at bedtime PRN Insomnia      Vital Signs Last 24 Hrs  T(C): 36.5 (26 Nov 2020 07:09), Max: 36.8 (26 Nov 2020 04:30)  T(F): 97.7 (26 Nov 2020 07:09), Max: 98.2 (26 Nov 2020 04:30)  HR: 59 (26 Nov 2020 07:09) (59 - 80)  BP: 118/46 (26 Nov 2020 07:09) (103/46 - 146/72)  BP(mean): --  RR: 17 (26 Nov 2020 07:09) (17 - 18)  SpO2: 95% (26 Nov 2020 07:09) (95% - 99%)    PHYSICAL EXAMINATION:  GENERAL: NAD, overweight   HEAD:  swelling on right side over back of head, Normocephalic  EYES:  conjunctiva and sclera clear  NECK: Supple, No JVD, Normal thyroid  CHEST/LUNG: Clear to auscultation. Clear to percussion bilaterally; No rales, rhonchi, wheezing, or rubs  HEART: Regular rate and rhythm; No murmurs, rubs, or gallops  ABDOMEN: Soft, Nontender, Nondistended; Bowel sounds present, no pain or masses on palpation   NERVOUS SYSTEM:  Alert & Oriented X3, calmed     : voiding with some degree of difficulty, mild swelling on glans and difficult retraction   EXTREMITIES:  2+ Peripheral Pulses, No clubbing, cyanosis, or edema  SKIN: warm dry                          12.3   9.33  )-----------( 204      ( 26 Nov 2020 07:31 )             39.2     11-26    139  |  106  |  13  ----------------------------<  79  4.1   |  27  |  0.96    Ca    8.5      26 Nov 2020 07:31  Phos  3.6     11-26  Mg     2.2     11-26    TPro  5.9<L>  /  Alb  2.9<L>  /  TBili  0.7  /  DBili  x   /  AST  13  /  ALT  14  /  AlkPhos  64  11-26    LIVER FUNCTIONS - ( 26 Nov 2020 07:31 )  Alb: 2.9 g/dL / Pro: 5.9 g/dL / ALK PHOS: 64 U/L / ALT: 14 U/L DA / AST: 13 U/L / GGT: x           CARDIAC MARKERS ( 25 Nov 2020 22:49 )  <0.015 ng/mL / x     / x     / x     / x      CARDIAC MARKERS ( 25 Nov 2020 16:39 )  <0.015 ng/mL / x     / x     / x     / x                               PGY-1 Progress Note discussed with attending    PAGER #: [[phone]] TILL 5:00 PM  PLEASE CONTACT ON CALL TEAM:  - On Call Team (Please refer to Rosita) FROM 5:00 PM - 8:30PM  - Nightfloat Team FROM 8:30 -7:30 AM    CHIEF COMPLAINT & BRIEF HOSPITAL COURSE:  Patient, an 85 year old male with PMH of cholecystectomy, ventral hernia, PE off Xarelto, varicose vein, chronic back pain, herniated disc, BPH s/p surgery, renal cyst, insomnia, hypothyroid and HTN presents to the ED after syncopal episode. According to patient, he went shopping an he suddenly passed out. He reports feeling dizzy and sweating before the episode. He had LOC for a couple of minutes and also hit his head at the back. Denies confusion after the episode or tongue biting but he did have urinary incontinence. Patient says he has history of dizziness for which he takes meclizine prn but he has never passed out before. Patient has been feeling nauseous and expectorating yellowish green phlegm in the ED. Denies any blood in vomitus. Denies headache, fevers, cough, SOB.  Patient found to be orthostatic positive. He received 1L bolus in the ED.  WBC 9 --> 12, lactate 2.1 --> 1, troponin x2 negative, Pro , UA positive, asymptomatic, s/p Meropenem, no antibiotics at this time, CT chest and abdomen/pelvis showed Appearance of the bowel in keeping with a diarrheal illness, correlate for symptoms of gastroenteritis. Borderline thickening of the distal thoracic esophagus, new from the prior, correlate for gastroesophageal reflux or recent emesis. No acute pulmonary embolus. CT head showed re-demonstration volume loss, microvascular disease, no acute hemorrhage or midline shift. Patient was evaluated by cardiologist recommends to hold Flomax for now and low dose Diltiazem.  Also patient complaining of difficulty to retract foreskin, painful and mild redness s/p TURP. Urology consulted.       GOC: Full code     INTERVAL HPI/OVERNIGHT EVENTS: patient refers he is feeling fine, mild pain on the back of his head. Patient denies new episodes of nausea vomiting or diarrhea. He also refers mild discomfort on his penis when urinating.     MEDICATIONS  (STANDING):  cefTRIAXone   IVPB 1000 milliGRAM(s) IV Intermittent every 24 hours  diltiazem    milliGRAM(s) Oral daily  enoxaparin Injectable 40 milliGRAM(s) SubCutaneous daily  levothyroxine 100 MICROGram(s) Oral daily  lidocaine   Patch 1 Patch Transdermal every 24 hours  pantoprazole    Tablet 40 milliGRAM(s) Oral before breakfast  sodium chloride 0.9%. 1000 milliLiter(s) (100 mL/Hr) IV Continuous <Continuous>    MEDICATIONS  (PRN):  meclizine 25 milliGRAM(s) Oral every 8 hours PRN Dizziness  ondansetron Injectable 4 milliGRAM(s) IV Push every 4 hours PRN Nausea and/or Vomiting  zolpidem 5 milliGRAM(s) Oral at bedtime PRN Insomnia  zolpidem 5 milliGRAM(s) Oral at bedtime PRN Insomnia      Vital Signs Last 24 Hrs  T(C): 36.5 (26 Nov 2020 07:09), Max: 36.8 (26 Nov 2020 04:30)  T(F): 97.7 (26 Nov 2020 07:09), Max: 98.2 (26 Nov 2020 04:30)  HR: 59 (26 Nov 2020 07:09) (59 - 80)  BP: 118/46 (26 Nov 2020 07:09) (103/46 - 146/72)  BP(mean): --  RR: 17 (26 Nov 2020 07:09) (17 - 18)  SpO2: 95% (26 Nov 2020 07:09) (95% - 99%)    PHYSICAL EXAMINATION:  GENERAL: NAD, overweight   HEAD:  swelling on right side over back of head, Normocephalic  EYES:  conjunctiva and sclera clear  NECK: Supple, No JVD, Normal thyroid  CHEST/LUNG: Clear to auscultation. Clear to percussion bilaterally; No rales, rhonchi, wheezing, or rubs  HEART: Regular rate and rhythm; No murmurs, rubs, or gallops  ABDOMEN: Soft, Nontender, Nondistended; Bowel sounds present, no pain or masses on palpation   NERVOUS SYSTEM:  Alert & Oriented X3, calmed     : voiding with some degree of difficulty, mild difficulty to retract foreskin, painful and mild redness   EXTREMITIES:  2+ Peripheral Pulses, No clubbing, cyanosis, or edema  SKIN: warm dry                          12.3   9.33  )-----------( 204      ( 26 Nov 2020 07:31 )             39.2     11-26    139  |  106  |  13  ----------------------------<  79  4.1   |  27  |  0.96    Ca    8.5      26 Nov 2020 07:31  Phos  3.6     11-26  Mg     2.2     11-26    TPro  5.9<L>  /  Alb  2.9<L>  /  TBili  0.7  /  DBili  x   /  AST  13  /  ALT  14  /  AlkPhos  64  11-26    LIVER FUNCTIONS - ( 26 Nov 2020 07:31 )  Alb: 2.9 g/dL / Pro: 5.9 g/dL / ALK PHOS: 64 U/L / ALT: 14 U/L DA / AST: 13 U/L / GGT: x           CARDIAC MARKERS ( 25 Nov 2020 22:49 )  <0.015 ng/mL / x     / x     / x     / x      CARDIAC MARKERS ( 25 Nov 2020 16:39 )  <0.015 ng/mL / x     / x     / x     / x                               PGY-1 Progress Note discussed with attending    PAGER #: [[phone]] TILL 5:00 PM  PLEASE CONTACT ON CALL TEAM:  - On Call Team (Please refer to Rosita) FROM 5:00 PM - 8:30PM  - Nightfloat Team FROM 8:30 -7:30 AM    CHIEF COMPLAINT & BRIEF HOSPITAL COURSE:  Patient, an 85 year old male with PMH of cholecystectomy, ventral hernia, PE off Xarelto, varicose vein, chronic back pain, herniated disc, BPH s/p surgery, renal cyst, insomnia, hypothyroid and HTN presents to the ED after syncopal episode. According to patient, he went shopping an he suddenly passed out. He reports feeling dizzy and sweating before the episode. He had LOC for a couple of minutes and also hit his head at the back. Denies confusion after the episode or tongue biting but he did have urinary incontinence. Patient says he has history of dizziness for which he takes meclizine prn but he has never passed out before. Patient has been feeling nauseous and expectorating yellowish green phlegm in the ED. Denies any blood in vomitus. Denies headache, fevers, cough, SOB.  Patient found to be orthostatic positive. He received 1L bolus in the ED and was continued on maintenance IV fluids.  On labs, WBC 9 --> 12, lactate 2.1 --> 1, troponin x2 negative, Pro , UA positive, asymptomatic, s/p Meropenem, no antibiotics at this time, CT chest and abdomen/pelvis showed Appearance of the bowel in keeping with a diarrheal illness, correlate for symptoms of gastroenteritis. Borderline thickening of the distal thoracic esophagus, new from the prior, correlate for gastroesophageal reflux or recent emesis. No acute pulmonary embolus. CT head showed re-demonstration volume loss, microvascular disease, no acute hemorrhage or midline shift. Patient was evaluated by cardiologist recommends to hold Flomax for now and low dose Diltiazem. Regarding his syncope, neurology evaluated patient and recommends EEG, meclizine, bilateral carotid doppler.   Also patient complaining of difficulty to retract foreskin, painful and mild redness s/p TURP. Urology consulted.       GOC: Full code     INTERVAL HPI/OVERNIGHT EVENTS: patient refers he is feeling fine, mild pain on the back of his head. Patient denies new episodes of nausea vomiting or diarrhea. He also refers mild discomfort on his penis when urinating.     MEDICATIONS  (STANDING):  cefTRIAXone   IVPB 1000 milliGRAM(s) IV Intermittent every 24 hours  diltiazem    milliGRAM(s) Oral daily  enoxaparin Injectable 40 milliGRAM(s) SubCutaneous daily  levothyroxine 100 MICROGram(s) Oral daily  lidocaine   Patch 1 Patch Transdermal every 24 hours  pantoprazole    Tablet 40 milliGRAM(s) Oral before breakfast  sodium chloride 0.9%. 1000 milliLiter(s) (100 mL/Hr) IV Continuous <Continuous>    MEDICATIONS  (PRN):  meclizine 25 milliGRAM(s) Oral every 8 hours PRN Dizziness  ondansetron Injectable 4 milliGRAM(s) IV Push every 4 hours PRN Nausea and/or Vomiting  zolpidem 5 milliGRAM(s) Oral at bedtime PRN Insomnia  zolpidem 5 milliGRAM(s) Oral at bedtime PRN Insomnia      Vital Signs Last 24 Hrs  T(C): 36.5 (26 Nov 2020 07:09), Max: 36.8 (26 Nov 2020 04:30)  T(F): 97.7 (26 Nov 2020 07:09), Max: 98.2 (26 Nov 2020 04:30)  HR: 59 (26 Nov 2020 07:09) (59 - 80)  BP: 118/46 (26 Nov 2020 07:09) (103/46 - 146/72)  BP(mean): --  RR: 17 (26 Nov 2020 07:09) (17 - 18)  SpO2: 95% (26 Nov 2020 07:09) (95% - 99%)    PHYSICAL EXAMINATION:  GENERAL: NAD, overweight   HEAD:  swelling on right side over back of head, Normocephalic  EYES:  conjunctiva and sclera clear  NECK: Supple, No JVD, Normal thyroid  CHEST/LUNG: Clear to auscultation. Clear to percussion bilaterally; No rales, rhonchi, wheezing, or rubs  HEART: Regular rate and rhythm; No murmurs, rubs, or gallops  ABDOMEN: Soft, Nontender, Nondistended; Bowel sounds present, no pain or masses on palpation   NERVOUS SYSTEM:  Alert & Oriented X3, calmed     : voiding with some degree of difficulty, mild difficulty to retract foreskin, painful and mild redness   EXTREMITIES:  2+ Peripheral Pulses, No clubbing, cyanosis, or edema  SKIN: warm dry                          12.3   9.33  )-----------( 204      ( 26 Nov 2020 07:31 )             39.2     11-26    139  |  106  |  13  ----------------------------<  79  4.1   |  27  |  0.96    Ca    8.5      26 Nov 2020 07:31  Phos  3.6     11-26  Mg     2.2     11-26    TPro  5.9<L>  /  Alb  2.9<L>  /  TBili  0.7  /  DBili  x   /  AST  13  /  ALT  14  /  AlkPhos  64  11-26    LIVER FUNCTIONS - ( 26 Nov 2020 07:31 )  Alb: 2.9 g/dL / Pro: 5.9 g/dL / ALK PHOS: 64 U/L / ALT: 14 U/L DA / AST: 13 U/L / GGT: x           CARDIAC MARKERS ( 25 Nov 2020 22:49 )  <0.015 ng/mL / x     / x     / x     / x      CARDIAC MARKERS ( 25 Nov 2020 16:39 )  <0.015 ng/mL / x     / x     / x     / x

## 2020-11-27 LAB
ANION GAP SERPL CALC-SCNC: 5 MMOL/L — SIGNIFICANT CHANGE UP (ref 5–17)
BUN SERPL-MCNC: 14 MG/DL — SIGNIFICANT CHANGE UP (ref 7–18)
CALCIUM SERPL-MCNC: 8.1 MG/DL — LOW (ref 8.4–10.5)
CHLORIDE SERPL-SCNC: 108 MMOL/L — SIGNIFICANT CHANGE UP (ref 96–108)
CO2 SERPL-SCNC: 29 MMOL/L — SIGNIFICANT CHANGE UP (ref 22–31)
CREAT SERPL-MCNC: 1.07 MG/DL — SIGNIFICANT CHANGE UP (ref 0.5–1.3)
GLUCOSE SERPL-MCNC: 79 MG/DL — SIGNIFICANT CHANGE UP (ref 70–99)
HCT VFR BLD CALC: 36.3 % — LOW (ref 39–50)
HGB BLD-MCNC: 11.6 G/DL — LOW (ref 13–17)
MAGNESIUM SERPL-MCNC: 2 MG/DL — SIGNIFICANT CHANGE UP (ref 1.6–2.6)
MCHC RBC-ENTMCNC: 31.9 PG — SIGNIFICANT CHANGE UP (ref 27–34)
MCHC RBC-ENTMCNC: 32 GM/DL — SIGNIFICANT CHANGE UP (ref 32–36)
MCV RBC AUTO: 99.7 FL — SIGNIFICANT CHANGE UP (ref 80–100)
NRBC # BLD: 0 /100 WBCS — SIGNIFICANT CHANGE UP (ref 0–0)
PHOSPHATE SERPL-MCNC: 3.2 MG/DL — SIGNIFICANT CHANGE UP (ref 2.5–4.5)
PLATELET # BLD AUTO: 167 K/UL — SIGNIFICANT CHANGE UP (ref 150–400)
POTASSIUM SERPL-MCNC: 3.9 MMOL/L — SIGNIFICANT CHANGE UP (ref 3.5–5.3)
POTASSIUM SERPL-SCNC: 3.9 MMOL/L — SIGNIFICANT CHANGE UP (ref 3.5–5.3)
RBC # BLD: 3.64 M/UL — LOW (ref 4.2–5.8)
RBC # FLD: 12.9 % — SIGNIFICANT CHANGE UP (ref 10.3–14.5)
SODIUM SERPL-SCNC: 142 MMOL/L — SIGNIFICANT CHANGE UP (ref 135–145)
WBC # BLD: 6.87 K/UL — SIGNIFICANT CHANGE UP (ref 3.8–10.5)
WBC # FLD AUTO: 6.87 K/UL — SIGNIFICANT CHANGE UP (ref 3.8–10.5)

## 2020-11-27 PROCEDURE — 99232 SBSQ HOSP IP/OBS MODERATE 35: CPT | Mod: GC

## 2020-11-27 PROCEDURE — 99233 SBSQ HOSP IP/OBS HIGH 50: CPT

## 2020-11-27 PROCEDURE — 95819 EEG AWAKE AND ASLEEP: CPT | Mod: 26

## 2020-11-27 RX ORDER — ERGOCALCIFEROL 1.25 MG/1
2000 CAPSULE ORAL DAILY
Refills: 0 | Status: DISCONTINUED | OUTPATIENT
Start: 2020-11-27 | End: 2020-11-27

## 2020-11-27 RX ORDER — CLOTRIMAZOLE AND BETAMETHASONE DIPROPIONATE 10; .5 MG/G; MG/G
1 CREAM TOPICAL
Refills: 0 | Status: DISCONTINUED | OUTPATIENT
Start: 2020-11-27 | End: 2020-11-28

## 2020-11-27 RX ORDER — CHOLECALCIFEROL (VITAMIN D3) 125 MCG
2000 CAPSULE ORAL DAILY
Refills: 0 | Status: DISCONTINUED | OUTPATIENT
Start: 2020-11-27 | End: 2020-11-28

## 2020-11-27 RX ADMIN — ZOLPIDEM TARTRATE 5 MILLIGRAM(S): 10 TABLET ORAL at 23:25

## 2020-11-27 RX ADMIN — ZOLPIDEM TARTRATE 5 MILLIGRAM(S): 10 TABLET ORAL at 21:41

## 2020-11-27 RX ADMIN — ZOLPIDEM TARTRATE 5 MILLIGRAM(S): 10 TABLET ORAL at 00:05

## 2020-11-27 RX ADMIN — PANTOPRAZOLE SODIUM 40 MILLIGRAM(S): 20 TABLET, DELAYED RELEASE ORAL at 05:41

## 2020-11-27 RX ADMIN — Medication 100 MILLIGRAM(S): at 05:40

## 2020-11-27 RX ADMIN — Medication 2000 UNIT(S): at 18:48

## 2020-11-27 RX ADMIN — Medication 120 MILLIGRAM(S): at 05:41

## 2020-11-27 RX ADMIN — ZOLPIDEM TARTRATE 5 MILLIGRAM(S): 10 TABLET ORAL at 00:04

## 2020-11-27 RX ADMIN — CLOTRIMAZOLE AND BETAMETHASONE DIPROPIONATE 1 APPLICATION(S): 10; .5 CREAM TOPICAL at 18:48

## 2020-11-27 RX ADMIN — ENOXAPARIN SODIUM 40 MILLIGRAM(S): 100 INJECTION SUBCUTANEOUS at 12:18

## 2020-11-27 RX ADMIN — LIDOCAINE 1 PATCH: 4 CREAM TOPICAL at 12:20

## 2020-11-27 RX ADMIN — Medication 100 MICROGRAM(S): at 05:41

## 2020-11-27 RX ADMIN — Medication 100 MILLIGRAM(S): at 14:49

## 2020-11-27 RX ADMIN — LIDOCAINE 1 PATCH: 4 CREAM TOPICAL at 08:20

## 2020-11-27 RX ADMIN — Medication 100 MILLIGRAM(S): at 21:42

## 2020-11-27 NOTE — PROGRESS NOTE ADULT - PROBLEM SELECTOR PLAN 5
Patient is on Flomax at home  Bladder scan in ED showed 110 cc  Continue to hold for now due to orthostatic hypotension.   Repeated bladder scan 50 cc  Urology consulted for mild difficulty to retract foreskin, painful and mild redness  Urology recommended clotrimazole/betamethasone cream, cefazolin and outpatient follow up.

## 2020-11-27 NOTE — PROGRESS NOTE ADULT - SUBJECTIVE AND OBJECTIVE BOX
NEUROLOGY FOLLOW-UP NOTE    NAME:  MP ALCAZAR        ASSESSMENT:  85 RHM with recurrent syncopal event in the setting of orthostatic hypotension, without electrodiagnostic evidence of epilepsy      RECOMMENDATIONS:      Syncope Workup and Management:    - Continue telemetry monitoring while inpatient    - Carotid Doppler and Echocardiogram normal - No further neuroimaging indicated    - Monitor orthostatic BP & HR with each vital signs check    - Encourage plentiful fluid hydration    - Caution with rapid changes in position    - Meclizine PRN dizziness/vertigo    - If orthostatic hypotension does not improve with the above steps, consider adding midodrine 5mg PO TID    - Routine follow-up in Neurology clinic for long-term evaluation and management        NOTE TO BE COMPLETED - PLEASE REFER TO ABOVE ONLY AND IGNORE INFORMATION BELOW    ******************************    HPI:  Patient, an 85 year old male with PMH of cholecystectomy, ventral hernia, PE off xarelto, varicose vein, chronic back pain, herniated disc, BPH s/p surgery, renal cyst, insomnia, hypothyroid and HTN presents to the ED after syncopal episode. According to patient, he went shopping today when around 12 he syncopised and fell down. He reports feeling dizzy, having sweating before the episode. He had LOC which he thinks was for couple of minutes, also hit his head at the back. Denies confusion after the episode or tongue bite but he did have urinary incontinence. Patient says he has history of dizziness for which he takes meclizine prn but he has never passed out before. Since then, patient has been feeling nauseous and seen to be expectorating yellowish green phlegm in the ED. Denies any blood in vomitus. Denies headache, fevers, cough, SOB.    In ED,   Vital Signs Last 24 Hrs  T(C): 36.4 (25 Nov 2020 19:27), Max: 36.4 (25 Nov 2020 19:27)  T(F): 97.5 (25 Nov 2020 19:27), Max: 97.5 (25 Nov 2020 19:27)  HR: 80 (25 Nov 2020 19:27) (78 - 80)  BP: 146/72 (25 Nov 2020 19:27) (135/82 - 146/72)  BP(mean): --  RR: 18 (25 Nov 2020 19:27) (18 - 18)  SpO2: 97% (25 Nov 2020 19:27) (97% - 98%)    Patient found to be orthostatic positive. He got 1L bolus in the ED.    GOC: Discussed with the patient in detail and explained to him about CPR and resuscitation. Patient says he does not understand exactly and wants to consult with wife. Need to be readdressed by primary team. Remains FULL CODE for now. (25 Nov 2020 19:57)      NEURO HPI:      INTERVAL HISTORY:      MEDICATIONS:  ceFAZolin   IVPB 1000 milliGRAM(s) IV Intermittent every 8 hours  clotrimazole/betamethasone Cream 1 Application(s) Topical two times a day  diltiazem    milliGRAM(s) Oral daily  enoxaparin Injectable 40 milliGRAM(s) SubCutaneous daily  ergocalciferol Drops 2000 Unit(s) Oral daily  levothyroxine 100 MICROGram(s) Oral daily  lidocaine   Patch 1 Patch Transdermal every 24 hours  meclizine 25 milliGRAM(s) Oral every 8 hours PRN  ondansetron Injectable 4 milliGRAM(s) IV Push every 4 hours PRN  pantoprazole    Tablet 40 milliGRAM(s) Oral before breakfast  zolpidem 5 milliGRAM(s) Oral at bedtime PRN  zolpidem 5 milliGRAM(s) Oral at bedtime PRN      ALLERGIES:  No Known Allergies      REVIEW OF SYSTEMS:  Fourteen systems reviewed and negative except as in HPI / Interval History.        OBJECTIVE:  Vital Signs Last 24 Hrs  T(C): 36.5 (27 Nov 2020 10:49), Max: 36.7 (26 Nov 2020 23:20)  T(F): 97.7 (27 Nov 2020 10:49), Max: 98.1 (26 Nov 2020 23:20)  HR: 72 (27 Nov 2020 10:49) (57 - 72)  BP: 127/57 (27 Nov 2020 10:49) (127/57 - 157/76)  BP(mean): --  RR: 18 (27 Nov 2020 10:49) (17 - 18)  SpO2: 97% (27 Nov 2020 10:49) (96% - 98%)    General Examination:  General: No acute distress  HEENT: Atraumatic, Normocephalic  Respiratory: CTA B/l.  No crackles, rhonchi, or wheezes.  Cardiovascular: RRR.  Normal S1 & S2.  Normal b/l radial and pedal pulses.    Neurological Examination:  General / Mental Status: AAO x 3.  No aphasia or dysarthria.  Naming and repetition intact.  Cranial Nerves: VFF x 4.  PERRL.  EOMI x 2, No nystagmus or diplopia.  B/l V1-V3 equal and intact to light touch and pinprick.  Symmetric facial movement and palate elevation.  B/l hearing equal to finger rub.  5/5 strength with b/l sternocleidomastoid & trapezius.  Midline tongue protrusion, with no atrophy or fasciculations.  Motor: Normal bulk & tone in all four extremities.  5/5 strength throughout all four extremities.  No downward drift, rigidity, spasticity, or tremors in any of the four extremities.  Sensory: Intact to light touch and pinprick in all four extremities.  Negative Romberg.  Reflex: 2+ and symmetric at b/l biceps, triceps, brachioradialis, patellae, and ankles.  Downgoing toes b/l.  Coordination: No dysmetria with b/l finger-to-nose and heel raise tests.  Symmetric rapid alternating movements b/l.  Gait: Normal, narrow-based gait.  No difficulty with tiptoe, heel, and tandem gaits.        LABORATORY VALUES:                          11.6   6.87  )-----------( 167      ( 27 Nov 2020 06:06 )             36.3       11-27    142  |  108  |  14  ----------------------------<  79  3.9   |  29  |  1.07    Ca    8.1<L>      27 Nov 2020 06:06  Phos  3.2     11-27  Mg     2.0     11-27    TPro  5.9<L>  /  Alb  2.9<L>  /  TBili  0.7  /  DBili  x   /  AST  13  /  ALT  14  /  AlkPhos  64  11-26      LIVER FUNCTIONS - ( 26 Nov 2020 07:31 )  Alb: 2.9 g/dL / Pro: 5.9 g/dL / ALK PHOS: 64 U/L / ALT: 14 U/L DA / AST: 13 U/L / GGT: x             11-26 Chol 105 LDL -- HDL 34<L> Trig 68    Glucose Trend  11-27-20 @ 06:06   -  -- 79 --  11-26-20 @ 07:31   -  -- 79 --  11-25-20 @ 16:39   -  -- 127<H> --  11-25-20 @ 15:35   -  -- -- 121<H>    A1C with Estimated Average Glucose (11.26.20 @ 11:25)   A1C with Estimated Average Glucose Result: 5.1%   Estimated Average Glucose: 100 mg/dL     Folate, Serum: 8.0 ng/mL (11-26-20 @ 11:30)  Vitamin B12, Serum: 388 pg/mL (11-26-20 @ 11:30)          NEUROIMAGING:          Please contact the Neurology consult service with any questions.      Mitesh Gallego MD   of Neurology  [facility] [employer] [facility] at [facility]         NEUROLOGY FOLLOW-UP NOTE    NAME:  MP ALCAZAR        ASSESSMENT:  85 RHM with recurrent syncopal event in the setting of orthostatic hypotension, without electrodiagnostic evidence of epilepsy      RECOMMENDATIONS:      Syncope Workup and Management:    - Continue telemetry monitoring while inpatient    - Carotid Doppler and Echocardiogram normal - No further neuroimaging indicated    - Monitor orthostatic BP & HR with each vital signs check    - Encourage plentiful fluid hydration    - Caution with rapid changes in position    - Meclizine PRN dizziness/vertigo    - If orthostatic hypotension does not improve with the above steps, consider adding midodrine 5mg PO TID    - Routine follow-up in Neurology clinic for long-term evaluation and management        ******************************    CHIEF COMPLAINT:  Patient is a 85y old  Male who presents with a chief complaint of syncope (25 Nov 2020 19:57)      HPI:  Patient, an 85 year old male with PMH of cholecystectomy, ventral hernia, PE off xarelto, varicose vein, chronic back pain, herniated disc, BPH s/p surgery, renal cyst, insomnia, hypothyroid and HTN presents to the ED after syncopal episode. According to patient, he went shopping today when around 12 he syncopised and fell down. He reports feeling dizzy, having sweating before the episode. He had LOC which he thinks was for couple of minutes, also hit his head at the back. Denies confusion after the episode or tongue bite but he did have urinary incontinence. Patient says he has history of dizziness for which he takes meclizine prn but he has never passed out before. Since then, patient has been feeling nauseous and seen to be expectorating yellowish green phlegm in the ED. Denies any blood in vomitus. Denies headache, fevers, cough, SOB. Patient found to be orthostatic positive. He got 1L bolus in the ED. GOC: Discussed with the patient in detail and explained to him about CPR and resuscitation. Patient says he does not understand exactly and wants to consult with wife. Need to be readdressed by primary team. Remains FULL CODE for now. (25 Nov 2020 19:57)  In ED,   Vital Signs Last 24 Hrs  T(C): 36.4 (25 Nov 2020 19:27), Max: 36.4 (25 Nov 2020 19:27)  T(F): 97.5 (25 Nov 2020 19:27), Max: 97.5 (25 Nov 2020 19:27)  HR: 80 (25 Nov 2020 19:27) (78 - 80)  BP: 146/72 (25 Nov 2020 19:27) (135/82 - 146/72)  RR: 18 (25 Nov 2020 19:27) (18 - 18)  SpO2: 97% (25 Nov 2020 19:27) (97% - 98%)    NEURO HPI:  85 RHM with episode of dizziness and sweating, followed by fall and loss of consciousness for a few minutes, as well as trauma to the head. The patient has been found to have positive orthostatic vital signs.    INTERVAL HISTORY:  The patient has denied any episodes of dizziness overnight.    MEDICATIONS:  ceFAZolin   IVPB 1000 milliGRAM(s) IV Intermittent every 8 hours  clotrimazole/betamethasone Cream 1 Application(s) Topical two times a day  diltiazem    milliGRAM(s) Oral daily  enoxaparin Injectable 40 milliGRAM(s) SubCutaneous daily  ergocalciferol Drops 2000 Unit(s) Oral daily  levothyroxine 100 MICROGram(s) Oral daily  lidocaine   Patch 1 Patch Transdermal every 24 hours  meclizine 25 milliGRAM(s) Oral every 8 hours PRN  ondansetron Injectable 4 milliGRAM(s) IV Push every 4 hours PRN  pantoprazole    Tablet 40 milliGRAM(s) Oral before breakfast  zolpidem 5 milliGRAM(s) Oral at bedtime PRN  zolpidem 5 milliGRAM(s) Oral at bedtime PRN      ALLERGIES:  No Known Allergies      REVIEW OF SYSTEMS:  Fourteen systems reviewed and negative except as in HPI / Interval History.        OBJECTIVE:  Vital Signs Last 24 Hrs  T(C): 36.5 (27 Nov 2020 10:49), Max: 36.7 (26 Nov 2020 23:20)  T(F): 97.7 (27 Nov 2020 10:49), Max: 98.1 (26 Nov 2020 23:20)  HR: 72 (27 Nov 2020 10:49) (57 - 72)  BP: 127/57 (27 Nov 2020 10:49) (127/57 - 157/76)  RR: 18 (27 Nov 2020 10:49) (17 - 18)  SpO2: 97% (27 Nov 2020 10:49) (96% - 98%)    General Examination:  General: No acute distress  HEENT: Atraumatic, Normocephalic  Respiratory: CTA B/l.  No crackles, rhonchi, or wheezes.  Cardiovascular: RRR.  Normal S1 & S2.  Normal b/l radial and pedal pulses.    Neurological Examination:  General / Mental Status: AAO x 3.  No aphasia or dysarthria.  Naming and repetition intact.  Cranial Nerves: VFF x 4.  PERRL.  EOMI x 2, No nystagmus or diplopia.  B/l V1-V3 equal and intact to light touch and pinprick.  Symmetric facial movement and palate elevation.  B/l hearing equal to finger rub.  Negative Tom-Hallpike maneuver b/l. 5/5 strength with b/l sternocleidomastoid & trapezius.  Midline tongue protrusion, with no atrophy or fasciculations.  Motor: Normal bulk & tone in all four extremities.  5/5 strength throughout all four extremities.  No downward drift, rigidity, spasticity, or tremors in any of the four extremities.  Sensory: Intact to light touch and pinprick in all four extremities.  Negative Romberg.  Reflex: 2+ and symmetric at b/l biceps, triceps, brachioradialis, patellae, and ankles.  Downgoing toes b/l.  Coordination: No dysmetria with b/l finger-to-nose and heel raise tests.  Symmetric rapid alternating movements b/l.  Gait: Normal, narrow-based gait.  No difficulty with tiptoe, heel, and tandem gaits.      LABORATORY VALUES:                          11.6   6.87  )-----------( 167      ( 27 Nov 2020 06:06 )             36.3       11-27    142  |  108  |  14  ----------------------------<  79  3.9   |  29  |  1.07    Ca    8.1<L>      27 Nov 2020 06:06  Phos  3.2     11-27  Mg     2.0     11-27    TPro  5.9<L>  /  Alb  2.9<L>  /  TBili  0.7  /  DBili  x   /  AST  13  /  ALT  14  /  AlkPhos  64  11-26 11-26 Chol 105 LDL -- HDL 34<L> Trig 68    Glucose Trend  11-27-20 @ 06:06   -  -- 79 --  11-26-20 @ 07:31   -  -- 79 --  11-25-20 @ 16:39   -  -- 127<H> --  11-25-20 @ 15:35   -  -- -- 121<H>    A1C with Estimated Average Glucose (11.26.20 @ 11:25)   A1C with Estimated Average Glucose Result: 5.1%   Estimated Average Glucose: 100 mg/dL     Folate, Serum: 8.0 ng/mL (11-26-20 @ 11:30)  Vitamin B12, Serum: 388 pg/mL (11-26-20 @ 11:30)          NEUROIMAGING:      CT Head (11/25/20):  - No acute intracranial abnormality  - Chronic microvascular disease  - Mild diffuse atrophy    Routine EEG (11/27/20):  - Normal awake and drowsy study  - No seizure tendency identified          Please contact the Neurology consult service with any questions.      Mitesh Gallego MD   of Neurology  [facility] [employer] [facility] at [facility]

## 2020-11-27 NOTE — PROGRESS NOTE ADULT - ASSESSMENT
85M with pmhx BPH s/p TURP 10/1/20 c/o phimosis and penile pain  labs reviewed, no leukocytosis, afebrile, vss, voiding    - continue care as per medical team  - continue abx  - patient may f/u outpatient with Dr. Ayala for elective circumcision 85M with pmhx BPH s/p TURP 10/1/20 c/o phimosis and penile pain  labs reviewed, no leukocytosis, afebrile, vss, voiding    - continue care as per medical team  - patient may f/u outpatient with Dr. Ayala for elective circumcision 85M with pmhx BPH s/p TURP 10/1/20 c/o phimosis and penile pain  labs reviewed, no leukocytosis, afebrile, vss, voiding    - continue care as per medical team  - patient may f/u outpatient with Dr. Ayala at regular scheduled appointment next month.  - please send home on clotrimazole/betamethasone cream 85M with pmhx BPH s/p TURP 10/1/20 c/o phimosis and penile pain  labs reviewed, no leukocytosis, afebrile, vss, voiding    - continue care as per medical team  - patient may f/u outpatient with Dr. Ayala at regular scheduled appointment next month.  - please give clotrimazole/betamethasone cream and give supply on discharge

## 2020-11-27 NOTE — PROGRESS NOTE ADULT - SUBJECTIVE AND OBJECTIVE BOX
PGY-1 Progress Note discussed with attending    PAGER #: [[phone]] TILL 5:00 PM  PLEASE CONTACT ON CALL TEAM:  - On Call Team (Please refer to Rosita) FROM 5:00 PM - 8:30PM  - Nightfloat Team FROM 8:30 -7:30 AM    CHIEF COMPLAINT & BRIEF HOSPITAL COURSE:      INTERVAL HPI/OVERNIGHT EVENTS:       REVIEW OF SYSTEMS:  CONSTITUTIONAL: No fever, weight loss, or fatigue  RESPIRATORY: No cough, wheezing, chills or hemoptysis; No shortness of breath  CARDIOVASCULAR: No chest pain, palpitations, dizziness, or leg swelling  GASTROINTESTINAL: No abdominal pain. No nausea, vomiting, or hematemesis; No diarrhea or constipation. No melena or hematochezia.  GENITOURINARY: No dysuria or hematuria, urinary frequency  NEUROLOGICAL: No headaches, memory loss, loss of strength, numbness, or tremors  SKIN: No itching, burning, rashes, or lesions     MEDICATIONS  (STANDING):  ceFAZolin   IVPB 1000 milliGRAM(s) IV Intermittent every 8 hours  clotrimazole/betamethasone Cream 1 Application(s) Topical two times a day  diltiazem    milliGRAM(s) Oral daily  enoxaparin Injectable 40 milliGRAM(s) SubCutaneous daily  ergocalciferol Drops 2000 Unit(s) Oral daily  levothyroxine 100 MICROGram(s) Oral daily  lidocaine   Patch 1 Patch Transdermal every 24 hours  pantoprazole    Tablet 40 milliGRAM(s) Oral before breakfast    MEDICATIONS  (PRN):  meclizine 25 milliGRAM(s) Oral every 8 hours PRN Dizziness  ondansetron Injectable 4 milliGRAM(s) IV Push every 4 hours PRN Nausea and/or Vomiting  zolpidem 5 milliGRAM(s) Oral at bedtime PRN Insomnia  zolpidem 5 milliGRAM(s) Oral at bedtime PRN Insomnia      Vital Signs Last 24 Hrs  T(C): 36.7 (27 Nov 2020 16:08), Max: 36.7 (26 Nov 2020 23:20)  T(F): 98 (27 Nov 2020 16:08), Max: 98.1 (26 Nov 2020 23:20)  HR: 60 (27 Nov 2020 16:08) (57 - 72)  BP: 146/73 (27 Nov 2020 16:08) (127/57 - 157/76)  BP(mean): --  RR: 18 (27 Nov 2020 16:08) (17 - 18)  SpO2: 96% (27 Nov 2020 16:08) (96% - 98%)    PHYSICAL EXAMINATION:  GENERAL: NAD, well built  HEAD:  Atraumatic, Normocephalic  EYES:  conjunctiva and sclera clear  NECK: Supple, No JVD, Normal thyroid  CHEST/LUNG: Clear to auscultation. Clear to percussion bilaterally; No rales, rhonchi, wheezing, or rubs  HEART: Regular rate and rhythm; No murmurs, rubs, or gallops  ABDOMEN: Soft, Nontender, Nondistended; Bowel sounds present  NERVOUS SYSTEM:  Alert & Oriented X3,    EXTREMITIES:  2+ Peripheral Pulses, No clubbing, cyanosis, or edema  SKIN: warm dry                          11.6   6.87  )-----------( 167      ( 27 Nov 2020 06:06 )             36.3     11-27    142  |  108  |  14  ----------------------------<  79  3.9   |  29  |  1.07    Ca    8.1<L>      27 Nov 2020 06:06  Phos  3.2     11-27  Mg     2.0     11-27    TPro  5.9<L>  /  Alb  2.9<L>  /  TBili  0.7  /  DBili  x   /  AST  13  /  ALT  14  /  AlkPhos  64  11-26    LIVER FUNCTIONS - ( 26 Nov 2020 07:31 )  Alb: 2.9 g/dL / Pro: 5.9 g/dL / ALK PHOS: 64 U/L / ALT: 14 U/L DA / AST: 13 U/L / GGT: x           CARDIAC MARKERS ( 25 Nov 2020 22:49 )  <0.015 ng/mL / x     / x     / x     / x      CARDIAC MARKERS ( 25 Nov 2020 16:39 )  <0.015 ng/mL / x     / x     / x     / x              I&O's Summary    26 Nov 2020 07:01  -  27 Nov 2020 07:00  --------------------------------------------------------  IN: 800 mL / OUT: 560 mL / NET: 240 mL            CAPILLARY BLOOD GLUCOSE      RADIOLOGY & ADDITIONAL TESTS:                   PGY-1 Progress Note discussed with attending    PAGER #: [[phone]] TILL 5:00 PM  PLEASE CONTACT ON CALL TEAM:  - On Call Team (Please refer to Rosita) FROM 5:00 PM - 8:30PM  - Nightfloat Team FROM 8:30 -7:30 AM    CHIEF COMPLAINT & BRIEF HOSPITAL COURSE:  Patient, an 85 year old male with PMH of cholecystectomy, ventral hernia, PE off Xarelto, varicose vein, chronic back pain, herniated disc, BPH s/p surgery, renal cyst, insomnia, hypothyroid and HTN presents to the ED after syncopal episode. According to patient, he went shopping an he suddenly passed out. He reports feeling dizzy and sweating before the episode. He had LOC for a couple of minutes and also hit his head at the back. Denies confusion after the episode or tongue biting but he did have urinary incontinence. Patient says he has history of dizziness for which he takes meclizine prn but he has never passed out before. Patient has been feeling nauseous and expectorating yellowish green phlegm in the ED. Denies any blood in vomitus. Denies headache, fevers, cough, SOB.  Patient found to be orthostatic positive. He received 1L bolus in the ED and was continued on maintenance IV fluids.  On labs, WBC 9 --> 12, lactate 2.1 --> 1, troponin x2 negative, Pro , UA positive, asymptomatic, s/p Meropenem, no antibiotics at this time, CT chest and abdomen/pelvis showed Appearance of the bowel in keeping with a diarrheal illness, correlate for symptoms of gastroenteritis. Borderline thickening of the distal thoracic esophagus, new from the prior, correlate for gastroesophageal reflux or recent emesis. No acute pulmonary embolus. CT head showed re-demonstration volume loss, microvascular disease, no acute hemorrhage or midline shift. Patient was evaluated by cardiologist recommends to hold Flomax for now and low dose Diltiazem. Regarding his syncope, neurology evaluated patient and recommends EEG, meclizine, bilateral carotid doppler.   Also patient complaining of difficulty to retract foreskin, painful and mild redness s/p TURP. Urology recommended clotrimazole/betamethasone cream, cefazolin and outpatient follow up.       GOC: Full code     INTERVAL HPI/OVERNIGHT EVENTS: patient refers he is feeling fine, no new episodes of nausea vomiting or diarrhea.      MEDICATIONS  (STANDING):  ceFAZolin   IVPB 1000 milliGRAM(s) IV Intermittent every 8 hours  clotrimazole/betamethasone Cream 1 Application(s) Topical two times a day  diltiazem    milliGRAM(s) Oral daily  enoxaparin Injectable 40 milliGRAM(s) SubCutaneous daily  ergocalciferol Drops 2000 Unit(s) Oral daily  levothyroxine 100 MICROGram(s) Oral daily  lidocaine   Patch 1 Patch Transdermal every 24 hours  pantoprazole    Tablet 40 milliGRAM(s) Oral before breakfast    MEDICATIONS  (PRN):  meclizine 25 milliGRAM(s) Oral every 8 hours PRN Dizziness  ondansetron Injectable 4 milliGRAM(s) IV Push every 4 hours PRN Nausea and/or Vomiting  zolpidem 5 milliGRAM(s) Oral at bedtime PRN Insomnia  zolpidem 5 milliGRAM(s) Oral at bedtime PRN Insomnia      Vital Signs Last 24 Hrs  T(C): 36.7 (27 Nov 2020 16:08), Max: 36.7 (26 Nov 2020 23:20)  T(F): 98 (27 Nov 2020 16:08), Max: 98.1 (26 Nov 2020 23:20)  HR: 60 (27 Nov 2020 16:08) (57 - 72)  BP: 146/73 (27 Nov 2020 16:08) (127/57 - 157/76)  BP(mean): --  RR: 18 (27 Nov 2020 16:08) (17 - 18)  SpO2: 96% (27 Nov 2020 16:08) (96% - 98%)    PHYSICAL EXAMINATION:  GENERAL: NAD, overweight   HEAD:  swelling on right side over back of head, Normocephalic  EYES:  conjunctiva and sclera clear  NECK: Supple, No JVD, Normal thyroid  CHEST/LUNG: Clear to auscultation. Clear to percussion bilaterally; No rales, rhonchi, wheezing, or rubs  HEART: Regular rate and rhythm; No murmurs, rubs, or gallops  ABDOMEN: Soft, Nontender, Nondistended; Bowel sounds present, no pain or masses on palpation   NERVOUS SYSTEM:  Alert & Oriented X3, calmed     : voiding with some degree of difficulty, mild difficulty to retract foreskin, painful and mild redness   EXTREMITIES:  2+ Peripheral Pulses, No clubbing, cyanosis, or edema  SKIN: warm dry                          11.6   6.87  )-----------( 167      ( 27 Nov 2020 06:06 )             36.3     11-27    142  |  108  |  14  ----------------------------<  79  3.9   |  29  |  1.07    Ca    8.1<L>      27 Nov 2020 06:06  Phos  3.2     11-27  Mg     2.0     11-27    TPro  5.9<L>  /  Alb  2.9<L>  /  TBili  0.7  /  DBili  x   /  AST  13  /  ALT  14  /  AlkPhos  64  11-26    LIVER FUNCTIONS - ( 26 Nov 2020 07:31 )  Alb: 2.9 g/dL / Pro: 5.9 g/dL / ALK PHOS: 64 U/L / ALT: 14 U/L DA / AST: 13 U/L / GGT: x           CARDIAC MARKERS ( 25 Nov 2020 22:49 )  <0.015 ng/mL / x     / x     / x     / x      CARDIAC MARKERS ( 25 Nov 2020 16:39 )  <0.015 ng/mL / x     / x     / x     / x              I&O's Summary    26 Nov 2020 07:01  -  27 Nov 2020 07:00  --------------------------------------------------------  IN: 800 mL / OUT: 560 mL / NET: 240 mL            CAPILLARY BLOOD GLUCOSE      RADIOLOGY & ADDITIONAL TESTS:

## 2020-11-27 NOTE — PROGRESS NOTE ADULT - ATTENDING COMMENTS
I counseled the patient about his neurodiagnostic test findings, and his most likely diagnosis.
Pt is an 86 yo M with pmh of recent TURP, ESBL ecoli UTI in september and Htn who presented of sudden onset of loss of consciousness. Pt is sp evaluation by cardiology and neurology. EEG, Echo and carotid US all WNL. Pt was noted to have a +UA but he is asymptomatic. 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  Dr Ayala from Urology consulted due to recent TURP. Pt has cellulitis to the penis. Pt was started on Cefazolin by urology. If improvement in swelling, will plan for discharge in am on po antibiotics.
Pt is an 84 yo M with pmh of recent TURP, ESBL ecoli UTI in september and Htn who presented of sudden onset of loss of consciousness. Pt currently being evaluated by cardiology and neurology to asses cause of his syncope. Currently is awaiting EEG, Echo and carotid US. Pt was noted to have a +UA but he is asymptomatic. 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 Will discontinue Merrem. Pt is requesting a call to Dr Ayala from Urology. Pt is due for a follow up post TURP but is asking to be seen sooner as he developed phimosis post the procedure. Pt is able to retract the foreskin but notes it is painful. Nursing to assist in penile cleaning.

## 2020-11-27 NOTE — PROGRESS NOTE ADULT - SUBJECTIVE AND OBJECTIVE BOX
INTERVAL HPI/OVERNIGHT EVENTS:  No acute events overnight. Pt voiding, although dribbling, reports penile pain.     MEDICATIONS  (STANDING):  ceFAZolin   IVPB 1000 milliGRAM(s) IV Intermittent every 8 hours  clotrimazole/betamethasone Cream 1 Application(s) Topical two times a day  diltiazem    milliGRAM(s) Oral daily  enoxaparin Injectable 40 milliGRAM(s) SubCutaneous daily  levothyroxine 100 MICROGram(s) Oral daily  lidocaine   Patch 1 Patch Transdermal every 24 hours  pantoprazole    Tablet 40 milliGRAM(s) Oral before breakfast  sodium chloride 0.9%. 1000 milliLiter(s) (75 mL/Hr) IV Continuous <Continuous>    MEDICATIONS  (PRN):  meclizine 25 milliGRAM(s) Oral every 8 hours PRN Dizziness  ondansetron Injectable 4 milliGRAM(s) IV Push every 4 hours PRN Nausea and/or Vomiting  zolpidem 5 milliGRAM(s) Oral at bedtime PRN Insomnia  zolpidem 5 milliGRAM(s) Oral at bedtime PRN Insomnia      Vital Signs Last 24 Hrs  T(C): 36.6 (27 Nov 2020 04:30), Max: 36.7 (26 Nov 2020 23:20)  T(F): 97.8 (27 Nov 2020 04:30), Max: 98.1 (26 Nov 2020 23:20)  HR: 57 (27 Nov 2020 04:30) (57 - 69)  BP: 138/53 (27 Nov 2020 04:30) (129/59 - 138/53)  RR: 17 (27 Nov 2020 04:30) (17 - 18)  SpO2: 96% (27 Nov 2020 04:30) (96% - 98%)    Physical:  General: Alert and oriented, not in acute distress  Resp: Breathing unlabored  Abdomen: Soft, nondistended, nontender  : penis uncircumcised, tight foreskin retracted, pt c/o pain at ventral side of glans, small excoriation noted; no other lesions noted; pt reports voiding.   Extremities: No pedal edema    I&O's Detail  26 Nov 2020 07:01  -  27 Nov 2020 07:00  --------------------------------------------------------  IN:    Oral Fluid: 275 mL    sodium chloride 0.9%: 525 mL  Total IN: 800 mL    OUT:    Voided (mL): 560 mL  Total OUT: 560 mL  Total NET: 240 mL    LABS:             11.6   6.87  )-----------( 167      ( 27 Nov 2020 06:06 )             36.3             11-27    142  |  108  |  14  ----------------------------<  79  3.9   |  29  |  1.07    Ca    8.1<L>      27 Nov 2020 06:06  Phos  3.2     11-27  Mg     2.0     11-27    TPro  5.9<L>  /  Alb  2.9<L>  /  TBili  0.7  /  DBili  x   /  AST  13  /  ALT  14  /  AlkPhos  64  11-26

## 2020-11-27 NOTE — EEG REPORT - NS EEG TEXT BOX
[facility]  Report of Routine EEG with Video    [facility]: [address] , [place] [address], Phone [phone]  [facility]: [address] [place] [address], Phone [phone]  [place] [facility]: [address], [address], [place] [address] Phone [phone]    [facility]: [address] [place] [address], Phone [phone]  [place] Office: [address] [place] [address], Phone [phone]    Patient Name: MP ALCAZAR    Age: 85 year  : 1935  Patient ID: -, MRN #: [id number], Location: [address] [place] [address] A-  Referring Physician: DR HAWTHORNE  EEG #: [id number]    Study Date: 2020     Technical Information:					  On Instrument: -  Placement and Labeling of Electrodes:  The EEG was performed utilizing 20 channels referential EEG connections (coronal over temporal over parasagittal montage) using all standard 10-20 electrode placements with EKG.  Recording was at a sampling rate of 256 samples per second per channel.  Time synchronized digital video recording was done simultaneously with EEG recording.  A low light infrared camera was used for low light recording.  Amauri and seizure detection algorithms were utilized.    History:   ROUTINE EEG PERFORMED AT BEDSIDE  86 Y/O MALE  H/O : HTN, HYPOTHYROID, INSOMNIA, DIZZINESS, HISTOPLASMOSIS, PULMONARY EMBOLISM  P/W : SYNCOPAL EPISODE  PATIENT IS ALERT AND ORIENTED X 3  PATIENT IS RIGHT HANDED  NO HYPERVENTILATION PERFORMED DUE TO AGE AND CONDITION  PHOTIC STIMULATION COMPLETED    Pertinent Medication  No AEDs    Study Interpretation:    FINDINGS: The background was continuous, spontaneously variable and reactive. During wakefulness, the posterior dominant rhythm consisted of symmetric, well-modulated 10 Hz activity, within normal limits for age, with amplitude to 30 uV, that attenuated to eye opening.      Background Slowing:  No generalized background slowing was present.    Focal Slowing:   None were present.  Sleep Background:  Drowsiness was characterized by fragmentation, attenuation, and slowing of the background activity.    Stage II sleep transients were not recorded.    Other Non-Epileptiform Findings:  None were present.    Interictal Epileptiform Activity:   None were present.    Events:  Clinical events: None recorded.  Seizures: None recorded.    Activation Procedures:   Hyperventilation was not performed.    Photic stimulation was performed and did not elicit any abnormalities.      Artifacts:  Intermittent myogenic and movement artifacts were noted.    ECG:  The heart rate on single channel ECG was predominantly between 60 and 70 BPM.    EEG Summary/Classification:    Normal EEG in the awake and drowsy states.    EEG Impression/Clinical Correlate:    Normal EEG study.    No epileptiform pattern or seizure seen.    A repeat study preceded by sleep deprivation may be considered to help capture the asleep state.    ________________________________________    Mitesh Hawthorne MD  Attending Physician, [facility]

## 2020-11-27 NOTE — PROGRESS NOTE ADULT - PROBLEM SELECTOR PLAN 2
patient has h/o dizziness and p/w syncope  Orthostatic positive in ED  Hold Flomax as it can also contribute to orthostatic hypotension  Repeated Orthostatics positive  S/p IVF

## 2020-11-27 NOTE — PROGRESS NOTE ADULT - PROBLEM SELECTOR PLAN 1
Patient presented after a syncopal episode with fall, LOC and head trauma   CTH showed no hemorrhage  EKG showed NSR with 1st degree HB   Lactate 2.1  Orthostatic positive in the ED  S/p 1l bolus in the ED  Trop x 2 negative  C/w tele  Troponin x 2 negative   CTA chest -ve for PE  UA positive, no signs of sirs, afebrile, no leucocytosis  EEG normal, no epileptiform pattern  Bilateral carotid doppler no stenosis   Echo showed normal ventricular function and DDI  C/w Meclizine prn for dizziness   Neuro Dr Gallego  Cardio Dr Godoy

## 2020-11-27 NOTE — PROGRESS NOTE ADULT - PROBLEM SELECTOR PLAN 3
On admission nausea and emesis x 1, no new events   CXR shows history of right middle lobectomy   Patient also has h/o cholecystectomy  CT chest and abdomen/pelvis showed Appearance of the bowel in keeping with a diarrheal illness, correlate for symptoms of gastroenteritis. Borderline thickening of the distal thoracic esophagus, new from the prior, correlate for gastroesophageal reflux or recent emesis. No acute pulmonary embolus.

## 2020-11-28 ENCOUNTER — TRANSCRIPTION ENCOUNTER (OUTPATIENT)
Age: 85
End: 2020-11-28

## 2020-11-28 VITALS
RESPIRATION RATE: 18 BRPM | DIASTOLIC BLOOD PRESSURE: 74 MMHG | HEART RATE: 55 BPM | SYSTOLIC BLOOD PRESSURE: 145 MMHG | WEIGHT: 185.85 LBS | OXYGEN SATURATION: 95 % | TEMPERATURE: 97 F

## 2020-11-28 LAB
CULTURE RESULTS: SIGNIFICANT CHANGE UP
SPECIMEN SOURCE: SIGNIFICANT CHANGE UP

## 2020-11-28 PROCEDURE — 70450 CT HEAD/BRAIN W/O DYE: CPT

## 2020-11-28 PROCEDURE — 83690 ASSAY OF LIPASE: CPT

## 2020-11-28 PROCEDURE — 82962 GLUCOSE BLOOD TEST: CPT

## 2020-11-28 PROCEDURE — 83605 ASSAY OF LACTIC ACID: CPT

## 2020-11-28 PROCEDURE — 82746 ASSAY OF FOLIC ACID SERUM: CPT

## 2020-11-28 PROCEDURE — 82306 VITAMIN D 25 HYDROXY: CPT

## 2020-11-28 PROCEDURE — 85652 RBC SED RATE AUTOMATED: CPT

## 2020-11-28 PROCEDURE — 71045 X-RAY EXAM CHEST 1 VIEW: CPT

## 2020-11-28 PROCEDURE — 86769 SARS-COV-2 COVID-19 ANTIBODY: CPT

## 2020-11-28 PROCEDURE — 84484 ASSAY OF TROPONIN QUANT: CPT

## 2020-11-28 PROCEDURE — 93005 ELECTROCARDIOGRAM TRACING: CPT

## 2020-11-28 PROCEDURE — 80061 LIPID PANEL: CPT

## 2020-11-28 PROCEDURE — 80053 COMPREHEN METABOLIC PANEL: CPT

## 2020-11-28 PROCEDURE — 93880 EXTRACRANIAL BILAT STUDY: CPT

## 2020-11-28 PROCEDURE — 87086 URINE CULTURE/COLONY COUNT: CPT

## 2020-11-28 PROCEDURE — 87635 SARS-COV-2 COVID-19 AMP PRB: CPT

## 2020-11-28 PROCEDURE — 84100 ASSAY OF PHOSPHORUS: CPT

## 2020-11-28 PROCEDURE — 93306 TTE W/DOPPLER COMPLETE: CPT

## 2020-11-28 PROCEDURE — 99285 EMERGENCY DEPT VISIT HI MDM: CPT

## 2020-11-28 PROCEDURE — 85025 COMPLETE CBC W/AUTO DIFF WBC: CPT

## 2020-11-28 PROCEDURE — 36415 COLL VENOUS BLD VENIPUNCTURE: CPT

## 2020-11-28 PROCEDURE — 95819 EEG AWAKE AND ASLEEP: CPT

## 2020-11-28 PROCEDURE — 80307 DRUG TEST PRSMV CHEM ANLYZR: CPT

## 2020-11-28 PROCEDURE — 82607 VITAMIN B-12: CPT

## 2020-11-28 PROCEDURE — 83880 ASSAY OF NATRIURETIC PEPTIDE: CPT

## 2020-11-28 PROCEDURE — 83735 ASSAY OF MAGNESIUM: CPT

## 2020-11-28 PROCEDURE — 80048 BASIC METABOLIC PNL TOTAL CA: CPT

## 2020-11-28 PROCEDURE — 85027 COMPLETE CBC AUTOMATED: CPT

## 2020-11-28 PROCEDURE — 84443 ASSAY THYROID STIM HORMONE: CPT

## 2020-11-28 PROCEDURE — 81001 URINALYSIS AUTO W/SCOPE: CPT

## 2020-11-28 PROCEDURE — 99232 SBSQ HOSP IP/OBS MODERATE 35: CPT | Mod: GC

## 2020-11-28 PROCEDURE — 71275 CT ANGIOGRAPHY CHEST: CPT

## 2020-11-28 PROCEDURE — 74177 CT ABD & PELVIS W/CONTRAST: CPT

## 2020-11-28 PROCEDURE — 83036 HEMOGLOBIN GLYCOSYLATED A1C: CPT

## 2020-11-28 PROCEDURE — 95957 EEG DIGITAL ANALYSIS: CPT

## 2020-11-28 RX ORDER — TAMSULOSIN HYDROCHLORIDE 0.4 MG/1
2 CAPSULE ORAL
Qty: 60 | Refills: 0
Start: 2020-11-28 | End: 2020-12-27

## 2020-11-28 RX ORDER — ACETAMINOPHEN 500 MG
650 TABLET ORAL ONCE
Refills: 0 | Status: COMPLETED | OUTPATIENT
Start: 2020-11-28 | End: 2020-11-28

## 2020-11-28 RX ORDER — MECLIZINE HCL 12.5 MG
1 TABLET ORAL
Qty: 30 | Refills: 0
Start: 2020-11-28 | End: 2020-12-07

## 2020-11-28 RX ORDER — CLOTRIMAZOLE AND BETAMETHASONE DIPROPIONATE 10; .5 MG/G; MG/G
1 CREAM TOPICAL
Qty: 1 | Refills: 0
Start: 2020-11-28 | End: 2020-12-11

## 2020-11-28 RX ORDER — PANTOPRAZOLE SODIUM 20 MG/1
1 TABLET, DELAYED RELEASE ORAL
Qty: 60 | Refills: 0
Start: 2020-11-28 | End: 2021-01-26

## 2020-11-28 RX ORDER — ZOLPIDEM TARTRATE 10 MG/1
1 TABLET ORAL
Qty: 14 | Refills: 0
Start: 2020-11-28 | End: 2020-12-11

## 2020-11-28 RX ORDER — CEFUROXIME AXETIL 250 MG
1 TABLET ORAL
Qty: 8 | Refills: 0
Start: 2020-11-28 | End: 2020-12-01

## 2020-11-28 RX ORDER — DILTIAZEM HCL 120 MG
1 CAPSULE, EXT RELEASE 24 HR ORAL
Qty: 0 | Refills: 0 | DISCHARGE

## 2020-11-28 RX ORDER — LEVOTHYROXINE SODIUM 125 MCG
1 TABLET ORAL
Qty: 30 | Refills: 0
Start: 2020-11-28 | End: 2020-12-27

## 2020-11-28 RX ORDER — DILTIAZEM HCL 120 MG
1 CAPSULE, EXT RELEASE 24 HR ORAL
Qty: 30 | Refills: 0
Start: 2020-11-28 | End: 2020-12-27

## 2020-11-28 RX ORDER — LEVOTHYROXINE SODIUM 125 MCG
1 TABLET ORAL
Qty: 0 | Refills: 0 | DISCHARGE

## 2020-11-28 RX ORDER — PANTOPRAZOLE SODIUM 20 MG/1
1 TABLET, DELAYED RELEASE ORAL
Qty: 0 | Refills: 0 | DISCHARGE

## 2020-11-28 RX ADMIN — CLOTRIMAZOLE AND BETAMETHASONE DIPROPIONATE 1 APPLICATION(S): 10; .5 CREAM TOPICAL at 05:56

## 2020-11-28 RX ADMIN — LIDOCAINE 1 PATCH: 4 CREAM TOPICAL at 02:03

## 2020-11-28 RX ADMIN — Medication 100 MICROGRAM(S): at 05:56

## 2020-11-28 RX ADMIN — Medication 2000 UNIT(S): at 11:48

## 2020-11-28 RX ADMIN — PANTOPRAZOLE SODIUM 40 MILLIGRAM(S): 20 TABLET, DELAYED RELEASE ORAL at 05:56

## 2020-11-28 RX ADMIN — LIDOCAINE 1 PATCH: 4 CREAM TOPICAL at 16:31

## 2020-11-28 RX ADMIN — ENOXAPARIN SODIUM 40 MILLIGRAM(S): 100 INJECTION SUBCUTANEOUS at 11:48

## 2020-11-28 RX ADMIN — Medication 120 MILLIGRAM(S): at 05:56

## 2020-11-28 RX ADMIN — Medication 650 MILLIGRAM(S): at 18:00

## 2020-11-28 RX ADMIN — Medication 100 MILLIGRAM(S): at 05:55

## 2020-11-28 RX ADMIN — Medication 100 MILLIGRAM(S): at 13:29

## 2020-11-28 RX ADMIN — LIDOCAINE 1 PATCH: 4 CREAM TOPICAL at 08:11

## 2020-11-28 RX ADMIN — CLOTRIMAZOLE AND BETAMETHASONE DIPROPIONATE 1 APPLICATION(S): 10; .5 CREAM TOPICAL at 17:05

## 2020-11-28 RX ADMIN — Medication 650 MILLIGRAM(S): at 17:05

## 2020-11-28 NOTE — DISCHARGE NOTE PROVIDER - HOSPITAL COURSE
Patient, an 85 year old male with PMH of cholecystectomy, ventral hernia, PE off Xarelto, varicose vein, chronic back pain, herniated disc, BPH s/p surgery, renal cyst, insomnia, hypothyroid and HTN presents to the ED after syncopal episode. According to patient, he went shopping an he suddenly passed out. He reports feeling dizzy and sweating before the episode. He had LOC for a couple of minutes and also hit his head at the back. Denies confusion after the episode or tongue biting but he did have urinary incontinence. Patient says he has history of dizziness for which he takes meclizine prn but he has never passed out before. Patient has been feeling nauseous and expectorating yellowish green phlegm in the ED. Denies any blood in vomitus. Denies headache, fevers, cough, SOB.  Patient found to be orthostatic positive. He received 1L bolus in the ED and was continued on maintenance IV fluids.  On labs, WBC 9 --> 12, lactate 2.1 --> 1, troponin x2 negative, Pro , UA positive no antibiotic needed as per ID doctor, asymptomatic, s/p Meropenem, no antibiotics at this time, CT chest and abdomen/pelvis showed Appearance of the bowel in keeping with a diarrheal illness, correlate for symptoms of gastroenteritis. Borderline thickening of the distal thoracic esophagus, new from the prior, correlate for gastroesophageal reflux or recent emesis. No acute pulmonary embolus. CT head showed re-demonstration volume loss, microvascular disease, no acute hemorrhage or midline shift. Patient was evaluated by cardiologist recommends to hold Flomax for now and low dose Diltiazem. Regarding his syncope, neurology evaluated patient and recommends EEG, meclizine, bilateral carotid doppler.   Also patient complaining of difficulty to retract foreskin, painful and mild redness s/p TURP. Urology recommended clotrimazole/betamethasone cream, cefazolin and outpatient follow up. Patient, an 85 year old male with PMH of cholecystectomy, ventral hernia, PE off Xarelto, varicose vein, chronic back pain, herniated disc, BPH s/p surgery, renal cyst, insomnia, hypothyroid and HTN presents to the ED after syncopal episode. According to patient, he went shopping an he suddenly passed out. He reports feeling dizzy and sweating before the episode. He had LOC for a couple of minutes and also hit his head at the back. Denies confusion after the episode or tongue biting but he did have urinary incontinence. Patient says he has history of dizziness for which he takes meclizine prn but he has never passed out before. Patient has been feeling nauseous and expectorating yellowish green phlegm in the ED. Denies any blood in vomitus. Denies headache, fevers, cough, SOB.  Patient found to be orthostatic positive. He received 1L bolus in the ED and was continued on maintenance IV fluids.  On labs, WBC 9 --> 12, lactate 2.1 --> 1, troponin x2 negative, Pro , UA positive no antibiotic needed as per ID doctor, asymptomatic, s/p Meropenem, no antibiotics at this time, CT chest and abdomen/pelvis showed Appearance of the bowel in keeping with a diarrheal illness, correlate for symptoms of gastroenteritis. Borderline thickening of the distal thoracic esophagus, new from the prior, correlate for gastroesophageal reflux or recent emesis. No acute pulmonary embolus. CT head showed re-demonstration volume loss, microvascular disease, no acute hemorrhage or midline shift. Patient was evaluated by cardiologist recommends to hold Flomax for now and low dose Diltiazem. Regarding his syncope, neurology evaluated patient and recommends EEG, meclizine, bilateral carotid doppler.   Also patient complaining of difficulty to retract foreskin, painful and mild redness s/p TURP. Urology recommended clotrimazole/betamethasone cream, cefazolin IV and outpatient follow up.   Patient is stable to be discharge home. Diltiazem was reduced to 120 mg daily, continued on Flomax home dose, Ceftin 250 mg twice a day and topical cream for affected area, meclizine as needed and pantoprazole for 8 weeks due to esophageal thickening. Patient is recommended to follow up as outpatient with PCP, Urology, Neurology and GI. Patient, an 85 year old male with PMH of cholecystectomy, ventral hernia, PE off Xarelto, varicose vein, chronic back pain, herniated disc, BPH s/p surgery, renal cyst, insomnia, hypothyroid and HTN presents to the ED after syncopal episode. According to patient, he went shopping an he suddenly passed out. He reports feeling dizzy and sweating before the episode. He had LOC for a couple of minutes and also hit his head at the back. Denies confusion after the episode or tongue biting but he did have urinary incontinence. Patient says he has history of dizziness for which he takes meclizine prn but he has never passed out before. Patient has been feeling nauseous and expectorating yellowish green phlegm in the ED. Denies any blood in vomitus. Denies headache, fevers, cough, SOB.  Patient found to be orthostatic positive. He received 1L bolus in the ED and was continued on maintenance IV fluids.  On labs, WBC 9 --> 12, lactate 2.1 --> 1, troponin x2 negative, Pro , UA positive no antibiotic needed as per ID doctor, asymptomatic, s/p Meropenem, no antibiotics at this time, CT chest and abdomen/pelvis showed Appearance of the bowel in keeping with a diarrheal illness, correlate for symptoms of gastroenteritis. Borderline thickening of the distal thoracic esophagus, new from the prior, correlate for gastroesophageal reflux or recent emesis. No acute pulmonary embolus. CT head showed re-demonstration volume loss, microvascular disease, no acute hemorrhage or midline shift. Patient was evaluated by cardiologist recommends to hold Flomax for now and low dose Diltiazem. Regarding his syncope, neurology evaluated patient and recommends EEG, meclizine, bilateral carotid doppler.   Also patient complaining of difficulty to retract foreskin, painful and mild redness s/p TURP. Urology recommended clotrimazole/betamethasone cream, cefazolin IV and outpatient follow up.   Patient is stable to be discharge home. Diltiazem was reduced to 120 mg daily, hold Flomax home dose, Ceftin 250 mg twice a day and topical cream for affected area, meclizine as needed and pantoprazole for 8 weeks due to esophageal thickening. Patient is recommended to follow up as outpatient with PCP, Urology, Neurology and GI.

## 2020-11-28 NOTE — DISCHARGE NOTE PROVIDER - CARE PROVIDER_API CALL
Gus Ayala J  UROLOGY  [address], [address]nd [place] [address]  Phone: [phone]  Fax: [phone]  Follow Up Time: 1 week    Justin Barlow  INTERNAL MEDICINE  [address] [place] [address] [address]  [place] [address]  Phone: [phone]  Fax: [phone]  Follow Up Time: 1 week   Gus Ayala J  UROLOGY  [address], [address]nd [place] [address]  Phone: [phone]  Fax: [phone]  Follow Up Time: 1 week    Justin Barlow  INTERNAL MEDICINE  [address], [address]th [address]  [place] [address]  Phone: [phone]  Fax: [phone]  Follow Up Time: 1 week    Mitesh Gallego)  Neurology  Epilepsy  [address], [address]  [place] [address]  Phone: [phone]  Follow Up Time: 1 week   Gus Ayala J  UROLOGY  [address], [address]nd [place] [address]  Phone: [phone]  Fax: [phone]  Follow Up Time: 1 week    Justin Barlow  INTERNAL MEDICINE  [address]th [address]  [place] [address]  Phone: [phone]  Fax: [phone]  Follow Up Time: 1 week    Mitesh Gallego)  Neurology  Epilepsy  [address], [address]  [place] [address]  Phone: [phone]  Follow Up Time: 1 week    Brijesh Daniels)  Gastroenterology; Internal Medicine  [address] [place] [address]  Phone: [phone]  Fax: [phone]  Follow Up Time: 1 week

## 2020-11-28 NOTE — DISCHARGE NOTE NURSING/CASE MANAGEMENT/SOCIAL WORK - PATIENT PORTAL LINK FT
You can access the FollowMyHealth Patient Portal offered by [facility] by registering at the following website: [email or web address]. By joining [employer]’s FollowMyHealth portal, you will also be able to view your health information using other applications (apps) compatible with our system.

## 2020-11-28 NOTE — DISCHARGE NOTE PROVIDER - NSDCMRMEDTOKEN_GEN_ALL_CORE_FT
dilTIAZem 180 mg/24 hours oral capsule, extended release: 1 cap(s) orally once a day  levothyroxine 100 mcg (0.1 mg) oral tablet: 1 tab(s) orally once a day  meclizine 25 mg oral tablet: 1 tab(s) orally every 8 hours, As needed, for Dizziness  pantoprazole 40 mg oral granule, delayed release: 1 each orally once a day  tamsulosin 0.4 mg oral capsule: 2 cap(s) orally once a day   zolpidem 10 mg oral tablet: 1 tab(s) orally once a day (at bedtime), As needed, Insomnia   cefuroxime 250 mg oral tablet: 1 tab(s) orally 2 times a day   clotrimazole-betamethasone dipropionate 1%-0.05% topical cream: 1 application topically 2 times a day  dilTIAZem 120 mg/24 hours oral capsule, extended release: 1 cap(s) orally once a day  levothyroxine 100 mcg (0.1 mg) oral tablet: 1 tab(s) orally once a day  meclizine 25 mg oral tablet: 1 tab(s) orally every 8 hours, As needed, Dizziness  pantoprazole 40 mg oral delayed release tablet: 1 tab(s) orally once a day (before a meal)  tamsulosin 0.4 mg oral capsule: 2 cap(s) orally once a day   zolpidem 10 mg oral tablet: 1 tab(s) orally once a day (at bedtime), As needed, Insomnia MDD:10mg   cefuroxime 250 mg oral tablet: 1 tab(s) orally 2 times a day   clotrimazole-betamethasone dipropionate 1%-0.05% topical cream: 1 application topically 2 times a day  dilTIAZem 120 mg/24 hours oral capsule, extended release: 1 cap(s) orally once a day  levothyroxine 100 mcg (0.1 mg) oral tablet: 1 tab(s) orally once a day  meclizine 25 mg oral tablet: 1 tab(s) orally every 8 hours, As needed, Dizziness  pantoprazole 40 mg oral delayed release tablet: 1 tab(s) orally once a day (before a meal)  zolpidem 10 mg oral tablet: 1 tab(s) orally once a day (at bedtime), As needed, Insomnia MDD:10mg

## 2020-11-28 NOTE — DISCHARGE NOTE PROVIDER - CARE PROVIDERS DIRECT ADDRESSES
,DirectAddress_Unknown,[email or web address] ,DirectAddress_Unknown,[email or web address] ,[email or web address],[email or web address]

## 2020-11-28 NOTE — DISCHARGE NOTE PROVIDER - PROVIDER TOKENS
PROVIDER:[TOKEN:[[id number]],FOLLOWUP:[1 week]],PROVIDER:[TOKEN:[[id number]],FOLLOWUP:[1 week]] PROVIDER:[TOKEN:[[id number]],FOLLOWUP:[1 week]],PROVIDER:[TOKEN:[[id number]],FOLLOWUP:[1 week]],PROVIDER:[TOKEN:[[id number]],FOLLOWUP:[1 week]] PROVIDER:[TOKEN:[[id number]],FOLLOWUP:[1 week]],PROVIDER:[TOKEN:[[id number]],FOLLOWUP:[1 week]],PROVIDER:[TOKEN:[[id number]],FOLLOWUP:[1 week]],PROVIDER:[TOKEN:[[id number]],FOLLOWUP:[1 week]]

## 2020-11-28 NOTE — DISCHARGE NOTE PROVIDER - NSDCCPCAREPLAN_GEN_ALL_CORE_FT
PRINCIPAL DISCHARGE DIAGNOSIS  Diagnosis: Syncope and collapse  Assessment and Plan of Treatment:       SECONDARY DISCHARGE DIAGNOSES  Diagnosis: Hypertension  Assessment and Plan of Treatment: Hypertension    Diagnosis: Orthostatic hypotension  Assessment and Plan of Treatment: Orthostatic hypotension    Diagnosis: BPH (benign prostatic hyperplasia)  Assessment and Plan of Treatment: BPH (benign prostatic hyperplasia)    Diagnosis: Hypothyroid  Assessment and Plan of Treatment: Hypothyroid     PRINCIPAL DISCHARGE DIAGNOSIS  Diagnosis: Syncope and collapse  Assessment and Plan of Treatment: You came to the hospital complaining of syncope. You had CT head done unremarkable,      SECONDARY DISCHARGE DIAGNOSES  Diagnosis: Hypertension  Assessment and Plan of Treatment: Hypertension    Diagnosis: Orthostatic hypotension  Assessment and Plan of Treatment: Orthostatic hypotension    Diagnosis: BPH (benign prostatic hyperplasia)  Assessment and Plan of Treatment: BPH (benign prostatic hyperplasia)    Diagnosis: Hypothyroid  Assessment and Plan of Treatment: Hypothyroid     PRINCIPAL DISCHARGE DIAGNOSIS  Diagnosis: Syncope and collapse  Assessment and Plan of Treatment: You came to the hospital complaining of syncope. You had CT head done unremarkable, EKG no acute changes, Echo showed normal left ventricular function and normal Ejection fraction 55-60%, stress test normal. You were monitor on telemetry floor, cardiac enzymes negative, no electrolyte imbalances. You were also seen by neurologist, who recommended Meclizine 25 mg every 8 hrs as needed. Your neurology work up was negative, EEG normal, no epileptiform pattern or seizures found.    Please follow-up with your PCP in 1 week.      SECONDARY DISCHARGE DIAGNOSES  Diagnosis: Hypertension  Assessment and Plan of Treatment: You have a history of Hypertension.  On this admission, your Blood Pressure was adequately controlled with Diltiazem 120 mg daily. Your blood pressure medication was adjusted from Diltiazem 180 mg daily to 120 mg daily due to hypotension.    Your blood pressure target is 120-140/80-90, maintain healthy lifestyle, low salt diet, avoid fatty food, weight loss, exercise regularly as tolerated 30 mins X 3 times per week.  Notify your doctor if you have any of the following symptoms:   (Dizziness, Lightheadedness, Blurry vision, Headache, Chest pain, Shortness of breath.)  Please follow-up with your PCP in 1 week.    Diagnosis: Orthostatic hypotension  Assessment and Plan of Treatment: On this admission, you were found to have Orthostatic hypotension which means your blood pressure dropped with changes in position. You were given IV fluids with improvement of blood pressure. You are recommended to stay hydrated.   Please follow-up with your PCP in 1 week.    Diagnosis: BPH (benign prostatic hyperplasia)  Assessment and Plan of Treatment: You have history of BPH (benign prostatic hyperplasia). You had a recent TURP procedure by urology. On this admission, you were complaining of pain when retracting the foreskin. You were seen by urology who recommended antibiotic treatment with Ceftin 250 mg twice a day for 5 days and topical cream Clotrimazole-Betamethasone twice a day.  Please follow-up with your PCP and Urologist in 1 week.    Diagnosis: Hypothyroid  Assessment and Plan of Treatment: You have history of Hypothyroidism. You are taking Synthroid at home. On this admission, your TSH was 2.8 normal. No changes have been made to your regimen. Please continue to take your home medications.   Please follow-up with your PCP.

## 2021-01-12 ENCOUNTER — APPOINTMENT (OUTPATIENT)
Dept: UROLOGY | Facility: CLINIC | Age: 86
End: 2021-01-12

## 2021-10-12 NOTE — ED ADULT NURSE NOTE - NS ED NURSE LEVEL OF CONSCIOUSNESS AFFECT
Pt called in to schedule surgery. Spoke with Viraj Camejo, she will contact pt this afternoon, advised pt. Calm

## 2022-01-12 ENCOUNTER — EMERGENCY (EMERGENCY)
Facility: HOSPITAL | Age: 87
LOS: 1 days | Discharge: HOME | End: 2022-01-12
Attending: EMERGENCY MEDICINE
Payer: COMMERCIAL

## 2022-01-12 VITALS
SYSTOLIC BLOOD PRESSURE: 139 MMHG | HEART RATE: 106 BPM | OXYGEN SATURATION: 96 % | TEMPERATURE: 99 F | WEIGHT: 183.87 LBS | DIASTOLIC BLOOD PRESSURE: 84 MMHG | RESPIRATION RATE: 20 BRPM | HEIGHT: 66 IN

## 2022-01-12 VITALS
OXYGEN SATURATION: 96 % | DIASTOLIC BLOOD PRESSURE: 77 MMHG | SYSTOLIC BLOOD PRESSURE: 147 MMHG | RESPIRATION RATE: 19 BRPM | TEMPERATURE: 98 F | HEART RATE: 85 BPM

## 2022-01-12 DIAGNOSIS — Z90.49 ACQUIRED ABSENCE OF OTHER SPECIFIED PARTS OF DIGESTIVE TRACT: Chronic | ICD-10-CM

## 2022-01-12 DIAGNOSIS — M95.0 ACQUIRED DEFORMITY OF NOSE: Chronic | ICD-10-CM

## 2022-01-12 DIAGNOSIS — Z98.890 OTHER SPECIFIED POSTPROCEDURAL STATES: Chronic | ICD-10-CM

## 2022-01-12 LAB
ACETONE SERPL-MCNC: NEGATIVE — SIGNIFICANT CHANGE UP
ALBUMIN SERPL ELPH-MCNC: 2.8 G/DL — LOW (ref 3.5–5)
ALP SERPL-CCNC: 59 U/L — SIGNIFICANT CHANGE UP (ref 40–120)
ALT FLD-CCNC: 71 U/L DA — HIGH (ref 10–60)
ANION GAP SERPL CALC-SCNC: 6 MMOL/L — SIGNIFICANT CHANGE UP (ref 5–17)
AST SERPL-CCNC: 54 U/L — HIGH (ref 10–40)
BASOPHILS # BLD AUTO: 0.03 K/UL — SIGNIFICANT CHANGE UP (ref 0–0.2)
BASOPHILS NFR BLD AUTO: 0.9 % — SIGNIFICANT CHANGE UP (ref 0–2)
BILIRUB SERPL-MCNC: 0.4 MG/DL — SIGNIFICANT CHANGE UP (ref 0.2–1.2)
BUN SERPL-MCNC: 14 MG/DL — SIGNIFICANT CHANGE UP (ref 7–18)
CALCIUM SERPL-MCNC: 8.4 MG/DL — SIGNIFICANT CHANGE UP (ref 8.4–10.5)
CHLORIDE SERPL-SCNC: 105 MMOL/L — SIGNIFICANT CHANGE UP (ref 96–108)
CO2 SERPL-SCNC: 28 MMOL/L — SIGNIFICANT CHANGE UP (ref 22–31)
CREAT SERPL-MCNC: 1.01 MG/DL — SIGNIFICANT CHANGE UP (ref 0.5–1.3)
EOSINOPHIL # BLD AUTO: 0.04 K/UL — SIGNIFICANT CHANGE UP (ref 0–0.5)
EOSINOPHIL NFR BLD AUTO: 1.2 % — SIGNIFICANT CHANGE UP (ref 0–6)
GLUCOSE SERPL-MCNC: 88 MG/DL — SIGNIFICANT CHANGE UP (ref 70–99)
HCT VFR BLD CALC: 40.4 % — SIGNIFICANT CHANGE UP (ref 39–50)
HGB BLD-MCNC: 13.2 G/DL — SIGNIFICANT CHANGE UP (ref 13–17)
IMM GRANULOCYTES NFR BLD AUTO: 0.6 % — SIGNIFICANT CHANGE UP (ref 0–1.5)
LACTATE SERPL-SCNC: 1.1 MMOL/L — SIGNIFICANT CHANGE UP (ref 0.7–2)
LYMPHOCYTES # BLD AUTO: 0.78 K/UL — LOW (ref 1–3.3)
LYMPHOCYTES # BLD AUTO: 23.6 % — SIGNIFICANT CHANGE UP (ref 13–44)
MAGNESIUM SERPL-MCNC: 2.2 MG/DL — SIGNIFICANT CHANGE UP (ref 1.6–2.6)
MCHC RBC-ENTMCNC: 32 PG — SIGNIFICANT CHANGE UP (ref 27–34)
MCHC RBC-ENTMCNC: 32.7 GM/DL — SIGNIFICANT CHANGE UP (ref 32–36)
MCV RBC AUTO: 97.8 FL — SIGNIFICANT CHANGE UP (ref 80–100)
MONOCYTES # BLD AUTO: 0.31 K/UL — SIGNIFICANT CHANGE UP (ref 0–0.9)
MONOCYTES NFR BLD AUTO: 9.4 % — SIGNIFICANT CHANGE UP (ref 2–14)
NEUTROPHILS # BLD AUTO: 2.13 K/UL — SIGNIFICANT CHANGE UP (ref 1.8–7.4)
NEUTROPHILS NFR BLD AUTO: 64.3 % — SIGNIFICANT CHANGE UP (ref 43–77)
NRBC # BLD: 0 /100 WBCS — SIGNIFICANT CHANGE UP (ref 0–0)
NT-PROBNP SERPL-SCNC: 280 PG/ML — SIGNIFICANT CHANGE UP (ref 0–450)
PLATELET # BLD AUTO: 101 K/UL — LOW (ref 150–400)
POTASSIUM SERPL-MCNC: 4.1 MMOL/L — SIGNIFICANT CHANGE UP (ref 3.5–5.3)
POTASSIUM SERPL-SCNC: 4.1 MMOL/L — SIGNIFICANT CHANGE UP (ref 3.5–5.3)
PROT SERPL-MCNC: 6.6 G/DL — SIGNIFICANT CHANGE UP (ref 6–8.3)
RBC # BLD: 4.13 M/UL — LOW (ref 4.2–5.8)
RBC # FLD: 12.6 % — SIGNIFICANT CHANGE UP (ref 10.3–14.5)
SARS-COV-2 RNA SPEC QL NAA+PROBE: DETECTED
SODIUM SERPL-SCNC: 139 MMOL/L — SIGNIFICANT CHANGE UP (ref 135–145)
WBC # BLD: 3.31 K/UL — LOW (ref 3.8–10.5)
WBC # FLD AUTO: 3.31 K/UL — LOW (ref 3.8–10.5)

## 2022-01-12 PROCEDURE — 99285 EMERGENCY DEPT VISIT HI MDM: CPT | Mod: 25

## 2022-01-12 PROCEDURE — 36415 COLL VENOUS BLD VENIPUNCTURE: CPT

## 2022-01-12 PROCEDURE — 71045 X-RAY EXAM CHEST 1 VIEW: CPT | Mod: 26

## 2022-01-12 PROCEDURE — 83735 ASSAY OF MAGNESIUM: CPT

## 2022-01-12 PROCEDURE — 71045 X-RAY EXAM CHEST 1 VIEW: CPT

## 2022-01-12 PROCEDURE — 80053 COMPREHEN METABOLIC PANEL: CPT

## 2022-01-12 PROCEDURE — 82962 GLUCOSE BLOOD TEST: CPT

## 2022-01-12 PROCEDURE — 99285 EMERGENCY DEPT VISIT HI MDM: CPT

## 2022-01-12 PROCEDURE — 93005 ELECTROCARDIOGRAM TRACING: CPT

## 2022-01-12 PROCEDURE — 87635 SARS-COV-2 COVID-19 AMP PRB: CPT

## 2022-01-12 PROCEDURE — 83880 ASSAY OF NATRIURETIC PEPTIDE: CPT

## 2022-01-12 PROCEDURE — 85025 COMPLETE CBC W/AUTO DIFF WBC: CPT

## 2022-01-12 PROCEDURE — 82009 KETONE BODYS QUAL: CPT

## 2022-01-12 PROCEDURE — 83605 ASSAY OF LACTIC ACID: CPT

## 2022-01-12 PROCEDURE — 87040 BLOOD CULTURE FOR BACTERIA: CPT

## 2022-01-12 RX ORDER — SODIUM CHLORIDE 9 MG/ML
1000 INJECTION INTRAMUSCULAR; INTRAVENOUS; SUBCUTANEOUS
Refills: 0 | Status: DISCONTINUED | OUTPATIENT
Start: 2022-01-12 | End: 2022-01-15

## 2022-01-12 RX ORDER — ACETAMINOPHEN 500 MG
650 TABLET ORAL ONCE
Refills: 0 | Status: COMPLETED | OUTPATIENT
Start: 2022-01-12 | End: 2022-01-12

## 2022-01-12 RX ADMIN — Medication 650 MILLIGRAM(S): at 15:00

## 2022-01-12 RX ADMIN — Medication 650 MILLIGRAM(S): at 13:39

## 2022-01-12 NOTE — ED PROVIDER NOTE - PATIENT PORTAL LINK FT
You can access the FollowMyHealth Patient Portal offered by [facility] by registering at the following website: [email or web address]. By joining [employer]’s FollowMyHealth portal, you will also be able to view your health information using other applications (apps) compatible with our system.

## 2022-01-12 NOTE — ED PROVIDER NOTE - OBJECTIVE STATEMENT
86 y.o. male c/o myalgia, inc. fatigue, no appetite for 2 weeks, no coughing, sob, diarrhea, n/v, fever, pt received Moderna COVID vaccine, no flu vaccine.  Pt tested positive on 1/6 for COVID.  Pt took tylenol with no relief

## 2022-01-12 NOTE — ED ADULT NURSE NOTE - CAS ELECT INFOMATION PROVIDED
MD EMORY Shane provided DC instructions. Pulse ox given, teaching provided. Pt verbalized understanding of teaching/DC instructions/Other

## 2022-01-12 NOTE — ED PROVIDER NOTE - ENMT, MLM
Airway patent, Nasal mucosa clear. Mouth with normal mucosa. Throat has no vesicles, no oropharyngeal exudates and uvula is midline.  [place]

## 2022-01-12 NOTE — ED ADULT NURSE NOTE - OBJECTIVE STATEMENT
Pt c/o generalized body aches x couple of days. Pt tested positive for COVID 1/6. No acute distress noted, denies chest pain, no shortness of breath indicated.

## 2022-01-12 NOTE — ED PROVIDER NOTE - CARE PLAN
Principal Discharge DX:	2019 novel coronavirus disease (COVID-19)  Secondary Diagnosis:	Viral pneumonia  Secondary Diagnosis:	Myalgia   1

## 2022-01-17 LAB
CULTURE RESULTS: SIGNIFICANT CHANGE UP
CULTURE RESULTS: SIGNIFICANT CHANGE UP
SPECIMEN SOURCE: SIGNIFICANT CHANGE UP
SPECIMEN SOURCE: SIGNIFICANT CHANGE UP

## 2022-04-18 ENCOUNTER — EMERGENCY (EMERGENCY)
Facility: HOSPITAL | Age: 87
LOS: 1 days | Discharge: HOME | End: 2022-04-18
Attending: EMERGENCY MEDICINE
Payer: COMMERCIAL

## 2022-04-18 VITALS
WEIGHT: 186.95 LBS | TEMPERATURE: 97 F | RESPIRATION RATE: 19 BRPM | HEIGHT: 66 IN | OXYGEN SATURATION: 97 % | HEART RATE: 106 BPM | DIASTOLIC BLOOD PRESSURE: 92 MMHG | SYSTOLIC BLOOD PRESSURE: 166 MMHG

## 2022-04-18 VITALS
HEART RATE: 82 BPM | OXYGEN SATURATION: 99 % | RESPIRATION RATE: 18 BRPM | SYSTOLIC BLOOD PRESSURE: 135 MMHG | TEMPERATURE: 98 F | DIASTOLIC BLOOD PRESSURE: 76 MMHG

## 2022-04-18 DIAGNOSIS — Z98.890 OTHER SPECIFIED POSTPROCEDURAL STATES: Chronic | ICD-10-CM

## 2022-04-18 DIAGNOSIS — M95.0 ACQUIRED DEFORMITY OF NOSE: Chronic | ICD-10-CM

## 2022-04-18 DIAGNOSIS — Z90.49 ACQUIRED ABSENCE OF OTHER SPECIFIED PARTS OF DIGESTIVE TRACT: Chronic | ICD-10-CM

## 2022-04-18 LAB
ALBUMIN SERPL ELPH-MCNC: 3.2 G/DL — LOW (ref 3.5–5)
ALP SERPL-CCNC: 61 U/L — SIGNIFICANT CHANGE UP (ref 40–120)
ALT FLD-CCNC: 21 U/L DA — SIGNIFICANT CHANGE UP (ref 10–60)
ANION GAP SERPL CALC-SCNC: 8 MMOL/L — SIGNIFICANT CHANGE UP (ref 5–17)
APPEARANCE UR: CLEAR — SIGNIFICANT CHANGE UP
AST SERPL-CCNC: 14 U/L — SIGNIFICANT CHANGE UP (ref 10–40)
BASOPHILS # BLD AUTO: 0.14 K/UL — SIGNIFICANT CHANGE UP (ref 0–0.2)
BASOPHILS NFR BLD AUTO: 2 % — SIGNIFICANT CHANGE UP (ref 0–2)
BILIRUB SERPL-MCNC: 0.5 MG/DL — SIGNIFICANT CHANGE UP (ref 0.2–1.2)
BILIRUB UR-MCNC: NEGATIVE — SIGNIFICANT CHANGE UP
BUN SERPL-MCNC: 13 MG/DL — SIGNIFICANT CHANGE UP (ref 7–18)
CALCIUM SERPL-MCNC: 9.3 MG/DL — SIGNIFICANT CHANGE UP (ref 8.4–10.5)
CHLORIDE SERPL-SCNC: 106 MMOL/L — SIGNIFICANT CHANGE UP (ref 96–108)
CO2 SERPL-SCNC: 26 MMOL/L — SIGNIFICANT CHANGE UP (ref 22–31)
COLOR SPEC: YELLOW — SIGNIFICANT CHANGE UP
CREAT SERPL-MCNC: 1.17 MG/DL — SIGNIFICANT CHANGE UP (ref 0.5–1.3)
DIFF PNL FLD: ABNORMAL
EGFR: 61 ML/MIN/1.73M2 — SIGNIFICANT CHANGE UP
EOSINOPHIL # BLD AUTO: 0.24 K/UL — SIGNIFICANT CHANGE UP (ref 0–0.5)
EOSINOPHIL NFR BLD AUTO: 3.5 % — SIGNIFICANT CHANGE UP (ref 0–6)
GLUCOSE SERPL-MCNC: 134 MG/DL — HIGH (ref 70–99)
GLUCOSE UR QL: NEGATIVE — SIGNIFICANT CHANGE UP
HCT VFR BLD CALC: 42.7 % — SIGNIFICANT CHANGE UP (ref 39–50)
HGB BLD-MCNC: 14.2 G/DL — SIGNIFICANT CHANGE UP (ref 13–17)
IMM GRANULOCYTES NFR BLD AUTO: 0.4 % — SIGNIFICANT CHANGE UP (ref 0–1.5)
KETONES UR-MCNC: NEGATIVE — SIGNIFICANT CHANGE UP
LEUKOCYTE ESTERASE UR-ACNC: NEGATIVE — SIGNIFICANT CHANGE UP
LYMPHOCYTES # BLD AUTO: 1.01 K/UL — SIGNIFICANT CHANGE UP (ref 1–3.3)
LYMPHOCYTES # BLD AUTO: 14.7 % — SIGNIFICANT CHANGE UP (ref 13–44)
MCHC RBC-ENTMCNC: 32.5 PG — SIGNIFICANT CHANGE UP (ref 27–34)
MCHC RBC-ENTMCNC: 33.3 GM/DL — SIGNIFICANT CHANGE UP (ref 32–36)
MCV RBC AUTO: 97.7 FL — SIGNIFICANT CHANGE UP (ref 80–100)
MONOCYTES # BLD AUTO: 0.4 K/UL — SIGNIFICANT CHANGE UP (ref 0–0.9)
MONOCYTES NFR BLD AUTO: 5.8 % — SIGNIFICANT CHANGE UP (ref 2–14)
NEUTROPHILS # BLD AUTO: 5.05 K/UL — SIGNIFICANT CHANGE UP (ref 1.8–7.4)
NEUTROPHILS NFR BLD AUTO: 73.6 % — SIGNIFICANT CHANGE UP (ref 43–77)
NITRITE UR-MCNC: NEGATIVE — SIGNIFICANT CHANGE UP
NRBC # BLD: 0 /100 WBCS — SIGNIFICANT CHANGE UP (ref 0–0)
PH UR: 6 — SIGNIFICANT CHANGE UP (ref 5–8)
PLATELET # BLD AUTO: 201 K/UL — SIGNIFICANT CHANGE UP (ref 150–400)
POTASSIUM SERPL-MCNC: 3.9 MMOL/L — SIGNIFICANT CHANGE UP (ref 3.5–5.3)
POTASSIUM SERPL-SCNC: 3.9 MMOL/L — SIGNIFICANT CHANGE UP (ref 3.5–5.3)
PROT SERPL-MCNC: 7.3 G/DL — SIGNIFICANT CHANGE UP (ref 6–8.3)
PROT UR-MCNC: 30 MG/DL
RBC # BLD: 4.37 M/UL — SIGNIFICANT CHANGE UP (ref 4.2–5.8)
RBC # FLD: 12.3 % — SIGNIFICANT CHANGE UP (ref 10.3–14.5)
SODIUM SERPL-SCNC: 140 MMOL/L — SIGNIFICANT CHANGE UP (ref 135–145)
SP GR SPEC: 1.01 — SIGNIFICANT CHANGE UP (ref 1.01–1.02)
UROBILINOGEN FLD QL: NEGATIVE — SIGNIFICANT CHANGE UP
WBC # BLD: 6.87 K/UL — SIGNIFICANT CHANGE UP (ref 3.8–10.5)
WBC # FLD AUTO: 6.87 K/UL — SIGNIFICANT CHANGE UP (ref 3.8–10.5)

## 2022-04-18 PROCEDURE — 87086 URINE CULTURE/COLONY COUNT: CPT

## 2022-04-18 PROCEDURE — 99284 EMERGENCY DEPT VISIT MOD MDM: CPT

## 2022-04-18 PROCEDURE — G1004: CPT

## 2022-04-18 PROCEDURE — 81001 URINALYSIS AUTO W/SCOPE: CPT

## 2022-04-18 PROCEDURE — 80053 COMPREHEN METABOLIC PANEL: CPT

## 2022-04-18 PROCEDURE — 36415 COLL VENOUS BLD VENIPUNCTURE: CPT

## 2022-04-18 PROCEDURE — 99284 EMERGENCY DEPT VISIT MOD MDM: CPT | Mod: 25

## 2022-04-18 PROCEDURE — 74176 CT ABD & PELVIS W/O CONTRAST: CPT | Mod: MG

## 2022-04-18 PROCEDURE — 74176 CT ABD & PELVIS W/O CONTRAST: CPT | Mod: 26,MG

## 2022-04-18 PROCEDURE — 85025 COMPLETE CBC W/AUTO DIFF WBC: CPT

## 2022-04-18 RX ORDER — FAMOTIDINE 10 MG/ML
1 INJECTION INTRAVENOUS
Qty: 30 | Refills: 0
Start: 2022-04-18

## 2022-04-18 NOTE — ED PROVIDER NOTE - PROGRESS NOTE DETAILS
CT negative for acute pathology.  Likely musculoskeletal pain.  All results reviewed with patient who was given copy of results.  Will dc with PMD follow up.

## 2022-04-18 NOTE — ED ADULT NURSE NOTE - NSIMPLEMENTINTERV_GEN_ALL_ED
Implemented All Fall with Harm Risk Interventions:  [place] to call system. Call bell, personal items and telephone within reach. Instruct patient to call for assistance. Room bathroom lighting operational. Non-slip footwear when patient is off stretcher. Physically safe environment: no spills, clutter or unnecessary equipment. Stretcher in lowest position, wheels locked, appropriate side rails in place. Provide visual cue, wrist band, yellow gown, etc. Monitor gait and stability. Monitor for mental status changes and reorient to person, place, and time. Review medications for side effects contributing to fall risk. Reinforce activity limits and safety measures with patient and family. Provide visual clues: red socks.

## 2022-04-18 NOTE — ED PROVIDER NOTE - CLINICAL SUMMARY MEDICAL DECISION MAKING FREE TEXT BOX
pt presents with intermittent left flank  pain.  concern for UTI vs stone.  Will check labs, CT, UA, and reassess.

## 2022-04-18 NOTE — ED PROVIDER NOTE - OBJECTIVE STATEMENT
87 y/o male with PMHx of HTN, hypothyroid, and BPH presents with intermittent left flank pain for the past 2 weeks.  pt has had kidney stones in the past and wants to make sure he does not have one currently.  Pt denies urinary complaints, denies nausea, vomiting, or fever.

## 2022-04-18 NOTE — ED PROVIDER NOTE - PATIENT PORTAL LINK FT
You can access the FollowMyHealth Patient Portal offered by [facility] by registering at the following website: [email or web address]. By joining [employer]’s FollowMyHealth portal, you will also be able to view your health information using other applications (apps) compatible with our system. Rituxan Counseling:  I discussed with the patient the risks of Rituxan infusions. Side effects can include infusion reactions, severe drug rashes including mucocutaneous reactions, reactivation of latent hepatitis and other infections and rarely progressive multifocal leukoencephalopathy.  All of the patient's questions and concerns were addressed.

## 2022-04-18 NOTE — ED PROVIDER NOTE - NSFOLLOWUPINSTRUCTIONS_ED_ALL_ED_FT
Sciatica       Sciatica is pain, numbness, weakness, or tingling along the path of the sciatic nerve. The sciatic nerve starts in the lower back and runs down the back of each leg. The nerve controls the muscles in the lower leg and in the back of the knee. It also provides feeling (sensation) to the back of the thigh, the lower leg, and the sole of the foot. Sciatica is a symptom of another medical condition that pinches or puts pressure on the sciatic nerve.    Sciatica most often only affects one side of the body. Sciatica usually goes away on its own or with treatment. In some cases, sciatica may come back (recur).      What are the causes?    This condition is caused by pressure on the sciatic nerve or pinching of the nerve. This may be the result of:  •A disk in between the bones of the spine bulging out too far (herniated disk).      •Age-related changes in the spinal disks.      •A pain disorder that affects a muscle in the buttock.      •Extra bone growth near the sciatic nerve.      •A break (fracture) of the pelvis.      •Pregnancy.      •Tumor. This is rare.        What increases the risk?    The following factors may make you more likely to develop this condition:  •Playing sports that place pressure or stress on the spine.      •Having poor strength and flexibility.      •A history of back injury or surgery.      •Sitting for long periods of time.      •Doing activities that involve repetitive bending or lifting.      •Obesity.        What are the signs or symptoms?    Symptoms can vary from mild to very severe, and they may include:•Any of these problems in the lower back, leg, hip, or buttock:  •Mild tingling, numbness, or dull aches.      •Burning sensations.      •Sharp pains.        •Numbness in the back of the calf or the sole of the foot.      •Leg weakness.      •Severe back pain that makes movement difficult.      Symptoms may get worse when you cough, sneeze, or laugh, or when you sit or stand for long periods of time.      How is this diagnosed?    This condition may be diagnosed based on:  •Your symptoms and medical history.      •A physical exam.      •Blood tests.    •Imaging tests, such as:  •X-rays.      •MRI.      •CT scan.          How is this treated?    In many cases, this condition improves on its own without treatment. However, treatment may include:  •Reducing or modifying physical activity.      •Exercising and stretching.      •Icing and applying heat to the affected area.    •Medicines that help to:  •Relieve pain and swelling.      •Relax your muscles.        •Injections of medicines that help to relieve pain, irritation, and inflammation around the sciatic nerve (steroids).      •Surgery.        Follow these instructions at home:    Medicines     •Take over-the-counter and prescription medicines only as told by your health care provider.    •Ask your health care provider if the medicine prescribed to you:  •Requires you to avoid driving or using heavy machinery.    •Can cause constipation. You may need to take these actions to prevent or treat constipation:  •Drink enough fluid to keep your urine pale yellow.      •Take over-the-counter or prescription medicines.      •Eat foods that are high in fiber, such as beans, whole grains, and fresh fruits and vegetables.      •Limit foods that are high in fat and processed sugars, such as fried or sweet foods.            Managing pain                 •If directed, put ice on the affected area.  •Put ice in a plastic bag.      •Place a towel between your skin and the bag.      •Leave the ice on for 20 minutes, 2–3 times a day.      •If directed, apply heat to the affected area. Use the heat source that your health care provider recommends, such as a moist heat pack or a heating pad.  •Place a towel between your skin and the heat source.      •Leave the heat on for 20–30 minutes.      •Remove the heat if your skin turns bright red. This is especially important if you are unable to feel pain, heat, or cold. You may have a greater risk of getting burned.          Activity      •Return to your normal activities as told by your health care provider. Ask your health care provider what activities are safe for you.      •Avoid activities that make your symptoms worse.    •Take brief periods of rest throughout the day.  •When you rest for longer periods, mix in some mild activity or stretching between periods of rest. This will help to prevent stiffness and pain.      •Avoid sitting for long periods of time without moving. Get up and move around at least one time each hour.        •Exercise and stretch regularly, as told by your health care provider.      • Do not lift anything that is heavier than 10 lb (4.5 kg) while you have symptoms of sciatica. When you do not have symptoms, you should still avoid heavy lifting, especially repetitive heavy lifting.    •When you lift objects, always use proper lifting technique, which includes:  •Bending your knees.      •Keeping the load close to your body.      •Avoiding twisting.        General instructions     •Maintain a healthy weight. Excess weight puts extra stress on your back.      •Wear supportive, comfortable shoes. Avoid wearing high heels.      •Avoid sleeping on a mattress that is too soft or too hard. A mattress that is firm enough to support your back when you sleep may help to reduce your pain.      •Keep all follow-up visits as told by your health care provider. This is important.        Contact a health care provider if:  •You have pain that:  •Wakes you up when you are sleeping.      •Gets worse when you lie down.      •Is worse than you have experienced in the past.      •Lasts longer than 4 weeks.        •You have an unexplained weight loss.        Get help right away if:    •You are not able to control when you urinate or have bowel movements (incontinence).    •You have:  •Weakness in your lower back, pelvis, buttocks, or legs that gets worse.      •Redness or swelling of your back.      •A burning sensation when you urinate.          Summary    •Sciatica is pain, numbness, weakness, or tingling along the path of the sciatic nerve.      •This condition is caused by pressure on the sciatic nerve or pinching of the nerve.      •Sciatica can cause pain, numbness, or tingling in the lower back, legs, hips, and buttocks.      •Treatment often includes rest, exercise, medicines, and applying ice or heat.      This information is not intended to replace advice given to you by your health care provider. Make sure you discuss any questions you have with your health care provider.      Document Revised: 01/06/2020 Document Reviewed: 01/06/2020    Elsevier Patient Education © 2022 [employer] Inc.

## 2022-04-19 LAB
CULTURE RESULTS: SIGNIFICANT CHANGE UP
SPECIMEN SOURCE: SIGNIFICANT CHANGE UP

## 2022-05-26 NOTE — ASU PREOP CHECKLIST - NS PREOP CHK HIBICLENS NA
#1:
On 3LNC at home which she states she does not always use. Has CPAP  -On Trelegy at home  -Cont. supplemental 02 as needed  -Pt with wheelchair at bedside

## 2022-08-02 NOTE — ED PROVIDER NOTE - NIH STROKE SCALE 1B. LOC QUESTION
Pt. Very pleased with care given and understands discharge info.   
(0) Answers both questions correctly

## 2022-09-20 NOTE — ED ADULT NURSE NOTE - NS PRO PASSIVE SMOKE EXP
Orders for today:   Continue chemoradiation therapy  Continue cisplatin every 3 weeks  Check CBC, CMP, and magnesium level weekly  Continue Xarelto for DVT  Follow-up with NP in 2 weeks, with CBC, CMP, and magnesium level. No

## 2022-12-21 NOTE — ASU PATIENT PROFILE, ADULT - FALL HARM RISK CONCLUSION
Universal Safety Interventions Glycopyrrolate Pregnancy And Lactation Text: This medication is Pregnancy Category B and is considered safe during pregnancy. It is unknown if it is excreted breast milk.

## 2023-01-31 ENCOUNTER — EMERGENCY (EMERGENCY)
Facility: HOSPITAL | Age: 88
LOS: 1 days | Discharge: HOME | End: 2023-01-31
Attending: EMERGENCY MEDICINE
Payer: MEDICARE

## 2023-01-31 VITALS
HEIGHT: 66 IN | SYSTOLIC BLOOD PRESSURE: 157 MMHG | OXYGEN SATURATION: 98 % | HEART RATE: 110 BPM | WEIGHT: 184.97 LBS | TEMPERATURE: 98 F | DIASTOLIC BLOOD PRESSURE: 90 MMHG | RESPIRATION RATE: 18 BRPM

## 2023-01-31 VITALS
OXYGEN SATURATION: 99 % | DIASTOLIC BLOOD PRESSURE: 81 MMHG | SYSTOLIC BLOOD PRESSURE: 143 MMHG | TEMPERATURE: 98 F | RESPIRATION RATE: 18 BRPM | HEART RATE: 86 BPM

## 2023-01-31 DIAGNOSIS — M95.0 ACQUIRED DEFORMITY OF NOSE: Chronic | ICD-10-CM

## 2023-01-31 DIAGNOSIS — Z98.890 OTHER SPECIFIED POSTPROCEDURAL STATES: Chronic | ICD-10-CM

## 2023-01-31 DIAGNOSIS — Z90.49 ACQUIRED ABSENCE OF OTHER SPECIFIED PARTS OF DIGESTIVE TRACT: Chronic | ICD-10-CM

## 2023-01-31 LAB
ALBUMIN SERPL ELPH-MCNC: 3.2 G/DL — LOW (ref 3.5–5)
ALP SERPL-CCNC: 77 U/L — SIGNIFICANT CHANGE UP (ref 40–120)
ALT FLD-CCNC: 27 U/L DA — SIGNIFICANT CHANGE UP (ref 10–60)
ANION GAP SERPL CALC-SCNC: 6 MMOL/L — SIGNIFICANT CHANGE UP (ref 5–17)
AST SERPL-CCNC: 13 U/L — SIGNIFICANT CHANGE UP (ref 10–40)
BASOPHILS # BLD AUTO: 0.11 K/UL — SIGNIFICANT CHANGE UP (ref 0–0.2)
BASOPHILS NFR BLD AUTO: 1.4 % — SIGNIFICANT CHANGE UP (ref 0–2)
BILIRUB SERPL-MCNC: 0.4 MG/DL — SIGNIFICANT CHANGE UP (ref 0.2–1.2)
BUN SERPL-MCNC: 13 MG/DL — SIGNIFICANT CHANGE UP (ref 7–18)
CALCIUM SERPL-MCNC: 9 MG/DL — SIGNIFICANT CHANGE UP (ref 8.4–10.5)
CHLORIDE SERPL-SCNC: 107 MMOL/L — SIGNIFICANT CHANGE UP (ref 96–108)
CO2 SERPL-SCNC: 29 MMOL/L — SIGNIFICANT CHANGE UP (ref 22–31)
CREAT SERPL-MCNC: 1.17 MG/DL — SIGNIFICANT CHANGE UP (ref 0.5–1.3)
EGFR: 60 ML/MIN/1.73M2 — SIGNIFICANT CHANGE UP
EOSINOPHIL # BLD AUTO: 0.13 K/UL — SIGNIFICANT CHANGE UP (ref 0–0.5)
EOSINOPHIL NFR BLD AUTO: 1.7 % — SIGNIFICANT CHANGE UP (ref 0–6)
GLUCOSE SERPL-MCNC: 145 MG/DL — HIGH (ref 70–99)
HCT VFR BLD CALC: 42.5 % — SIGNIFICANT CHANGE UP (ref 39–50)
HGB BLD-MCNC: 13.4 G/DL — SIGNIFICANT CHANGE UP (ref 13–17)
HIV 1 & 2 AB SERPL IA.RAPID: SIGNIFICANT CHANGE UP
IMM GRANULOCYTES NFR BLD AUTO: 0.4 % — SIGNIFICANT CHANGE UP (ref 0–0.9)
LACTATE SERPL-SCNC: 2.1 MMOL/L — HIGH (ref 0.7–2)
LIDOCAIN IGE QN: 146 U/L — SIGNIFICANT CHANGE UP (ref 73–393)
LYMPHOCYTES # BLD AUTO: 0.99 K/UL — LOW (ref 1–3.3)
LYMPHOCYTES # BLD AUTO: 13 % — SIGNIFICANT CHANGE UP (ref 13–44)
MCHC RBC-ENTMCNC: 31.5 GM/DL — LOW (ref 32–36)
MCHC RBC-ENTMCNC: 31.7 PG — SIGNIFICANT CHANGE UP (ref 27–34)
MCV RBC AUTO: 100.5 FL — HIGH (ref 80–100)
MONOCYTES # BLD AUTO: 0.37 K/UL — SIGNIFICANT CHANGE UP (ref 0–0.9)
MONOCYTES NFR BLD AUTO: 4.9 % — SIGNIFICANT CHANGE UP (ref 2–14)
NEUTROPHILS # BLD AUTO: 5.99 K/UL — SIGNIFICANT CHANGE UP (ref 1.8–7.4)
NEUTROPHILS NFR BLD AUTO: 78.6 % — HIGH (ref 43–77)
NRBC # BLD: 0 /100 WBCS — SIGNIFICANT CHANGE UP (ref 0–0)
PLATELET # BLD AUTO: 254 K/UL — SIGNIFICANT CHANGE UP (ref 150–400)
POTASSIUM SERPL-MCNC: 4.6 MMOL/L — SIGNIFICANT CHANGE UP (ref 3.5–5.3)
POTASSIUM SERPL-SCNC: 4.6 MMOL/L — SIGNIFICANT CHANGE UP (ref 3.5–5.3)
PROT SERPL-MCNC: 7.3 G/DL — SIGNIFICANT CHANGE UP (ref 6–8.3)
RBC # BLD: 4.23 M/UL — SIGNIFICANT CHANGE UP (ref 4.2–5.8)
RBC # FLD: 12.4 % — SIGNIFICANT CHANGE UP (ref 10.3–14.5)
SARS-COV-2 RNA SPEC QL NAA+PROBE: SIGNIFICANT CHANGE UP
SODIUM SERPL-SCNC: 142 MMOL/L — SIGNIFICANT CHANGE UP (ref 135–145)
WBC # BLD: 7.62 K/UL — SIGNIFICANT CHANGE UP (ref 3.8–10.5)
WBC # FLD AUTO: 7.62 K/UL — SIGNIFICANT CHANGE UP (ref 3.8–10.5)

## 2023-01-31 PROCEDURE — 99284 EMERGENCY DEPT VISIT MOD MDM: CPT | Mod: 25

## 2023-01-31 PROCEDURE — 83690 ASSAY OF LIPASE: CPT

## 2023-01-31 PROCEDURE — 87635 SARS-COV-2 COVID-19 AMP PRB: CPT

## 2023-01-31 PROCEDURE — 80053 COMPREHEN METABOLIC PANEL: CPT

## 2023-01-31 PROCEDURE — 86703 HIV-1/HIV-2 1 RESULT ANTBDY: CPT

## 2023-01-31 PROCEDURE — 36415 COLL VENOUS BLD VENIPUNCTURE: CPT

## 2023-01-31 PROCEDURE — 74177 CT ABD & PELVIS W/CONTRAST: CPT | Mod: MA

## 2023-01-31 PROCEDURE — 85025 COMPLETE CBC W/AUTO DIFF WBC: CPT

## 2023-01-31 PROCEDURE — 83605 ASSAY OF LACTIC ACID: CPT

## 2023-01-31 PROCEDURE — 99284 EMERGENCY DEPT VISIT MOD MDM: CPT

## 2023-01-31 PROCEDURE — 74177 CT ABD & PELVIS W/CONTRAST: CPT | Mod: 26,MA

## 2023-01-31 RX ORDER — SODIUM CHLORIDE 9 MG/ML
1000 INJECTION INTRAMUSCULAR; INTRAVENOUS; SUBCUTANEOUS ONCE
Refills: 0 | Status: COMPLETED | OUTPATIENT
Start: 2023-01-31 | End: 2023-01-31

## 2023-01-31 RX ADMIN — SODIUM CHLORIDE 1000 MILLILITER(S): 9 INJECTION INTRAMUSCULAR; INTRAVENOUS; SUBCUTANEOUS at 13:44

## 2023-01-31 NOTE — ED PROVIDER NOTE - OBJECTIVE STATEMENT
87 year old male with past medical history of hypertension. hypothyroid, and BPH presents to the ED with complaints of abdominal pain, nausea, and vomiting for 2 days. The patient also reports leg pain which his is seeing vascular surgery for. He endorses being on Gabapentin. The patient denies any other symptoms including bloody stools, fever, chills, and decreased appetite. NKDA.

## 2023-01-31 NOTE — ED PROVIDER NOTE - PATIENT PORTAL LINK FT
You can access the FollowMyHealth Patient Portal offered by [facility] by registering at the following website: [email or web address]. By joining [employer]’s FollowMyHealth portal, you will also be able to view your health information using other applications (apps) compatible with our system.

## 2023-01-31 NOTE — ED ADULT TRIAGE NOTE - CHIEF COMPLAINT QUOTE
c/o abdominal pain x 2 days no nausea / vomiting . reports abd pain , back and pain to legs for few weeks

## 2023-01-31 NOTE — ED PROVIDER NOTE - CLINICAL SUMMARY MEDICAL DECISION MAKING FREE TEXT BOX
87 year old male with past medical history of hypertension. hypothyroid, and BPH presents to the ED with complaints of abdominal pain, nausea, and vomiting for 2 days. Given the patient's age and new onset of abdominal pain, will CAT scan. If CAT scan is negative, will discharge patient, but if CAT scan is abnormal or if symptoms worsen will admit.

## 2023-01-31 NOTE — ED ADULT TRIAGE NOTE - LOCATION:
Hematology  Consult Note   Date of Service: 07/17/2020    Patient: Obed Wiley  MRN: [id number]  Admission Date: 7/17/2020  Hospital Day # 0    Reason for Consult: Anticoagulant management in patient with heterozygous protein C deficiency and alcoholic cirrhosis with coagulopathy and thrombocytopenia who developed portal vein thrombosis while on Lovenox.    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    Patient reports 2 days history of upper abdominal pain (mainly LUQ pain) that is associated with nausea. Denies vomiting or diarrhea. Denies fever, chest pain, or SOB, and no increased swelling in lower extremities. Has normal appetite and does not feel fatigue. Patient states that he has been injecting Lovenox daily at 6PM, last dose was on 7/15/20, since last admission in 01/2020 without any problem. Denies abnormal bleeding, black or bloody stool. No new rashes or bleeding spot.    Hematologic history:    2013: Hospitalized for intestinal ischemia, unclear if this was arterial or thrombotic event. On Warfarin for a few months, labile INR, then discontinued due to GIB.  11/2015: Hospitalized with acute pancreatitis and developed LLE DVT during hospitalization. On Xarelto for 3 months before switching to Aspirin.  08/2016: Hospitalized with decompensated cirrhosis. Found to have bilateral PE at that time. IVC filter placed. No anticoagulant due to bleeding complication (pneumoperitoneum).  09/2016: RLE DVT, started on Lovenox 80 mg daily.  04/2018: Noted to have IVC clot above IVC filter.  07/2018: Hospitalized with  acute pancreatitis. Switched to subcutaneous heparin 5000 units TID due to concerns on bleeding risks (large EV, thrombocytopenia).  01/2019: Thrombophilia work-up at [facility]. Has low protein C activity at 23 (%), low antithrombin, low fibrinogen, and low factor II, V, VII, X level. Negative factor V Leiden, antithrombin mutation, and antiphospholipid. Switched to Lovenox 40 mg daily to simplify regimen and patient has extensive abdominal scarring.  07/2019: Anticoagulant discontinued  01/2020: Hospitalized with mesenteric ischemia, plan was made to start on therapeutic Lovenox but, due to thrombocytopenia, was started on Lovenox 1 mg/kg daily on discharge and continue until this admission.    Review of Systems:  A comprehensive ROS was performed and found to be negative or non-contributory with the exception of that noted in the HPI above.    Past Medical History:  - Alcoholic cirrhosis with ascites, HE, SBP, EV, coagulopathy, and thrombocytopenia  - Obesity s/p Yan-en-Y gastric bypass  - T2DM (insulin use)  - Heterozygous protein C deficiency  - History of multiple VTEs with chronic indwelling IVC     Past Surgical History:  - Yan-en-Y gastric bypass in 2009  - IVC filter placed in 2016  - Cholecystectomy in 2017    Social History:  - Former smoker with 10 pack years smoking history. Quitted 3-4 years ago.  - Past history of alcohol use disorder, quitted for 2 years.  - Denies history of recreational drug use or IVDU.  - Lives at home in [place] with his mom who is a primary caregiver.    Family History:  Maternal history of protein C deficiency who developed multiple VTEs. His siblings were tested for protein C deficiency but were negative.    Outpatient Medications:  - Enoxaparin 88 mg subcutaneous daily  - Cholecalciferol 49249 units weekly and 2000 units daily  - Ciprofloxacin 500 mg daily  - Ferrous sulfate 325 mg daily  - Folic acid 1 mg daily  - Furosemide 40 mg TID  - Spironolactone 100 mg  "TID  - Lactulose 20 mg TID with PRN for constipation  - Rifaximin 550 mg BID  - Miralax 17 g daily  - Senna 8.6 mg daily PRN for constipation  - Magnesium oxide 400 mg TID  - Multivitamin-Folic acid tablet  - Percocet (10/325 mg) PRN q4h for pain  - Gabapentin 400 mg TID  - Pantoprazole 40 mg daily  - Promethazine 25 mg PRN q6h for nausea  - Quetiapine 50 mg HS with 25 mg daily PRN  - Trazodone 100 mg HS PRN  - Insulin Glargine 26 units BID with sliding scale Aspart    Physical Exam:    Blood pressure 97/49, pulse 55, temperature 97.4  F (36.3  C), temperature source Oral, resp. rate 16, height 1.88 m (6' 2\"), weight 81.7 kg (180 lb 1.9 oz), SpO2 99 %.  General: Alert and cooperative. Sitting up in bed with no acute distress  HEENT: Sclera anicteric. No conjunctival pallor. EOMI, MMM.  Neck: Supple, normal ROM. No cervical lymphadenopathy.  CV: RRR, no murmurs.  Resp: CTAB, normal respiratory effort on ambient air.  GI: Soft, non-distended, bowel sounds present and normoactive. Tenderness to palpation on LUQ without guarding or rebound. Palpable liver and spleen below costal margins.  MSK: RLE slightly larger that LLE. Warm and well-perfused, no edema.  Skin: No rashes or petechiae.  Neuro: AOx3, moves all extremities equally, no focal deficits. No asterixis.    Labs & Studies: I personally reviewed the following studies:  ROUTINE LABS (Last four results):  CMP  Recent Labs   Lab 07/17/20  0542      POTASSIUM 3.6   CHLORIDE 109   CO2 28   ANIONGAP 2*   *   BUN 12   CR 0.76   GFRESTIMATED >90   GFRESTBLACK >90   NATE 8.3*   PROTTOTAL 6.2*   ALBUMIN 2.9*   BILITOTAL 0.9   ALKPHOS 93   AST 36   ALT 30     CBC  Recent Labs   Lab 07/17/20  0542   WBC 3.7*   RBC 4.16*   HGB 12.9*   HCT 40.2   MCV 97   MCH 31.0   MCHC 32.1   RDW 14.1   PLT 41*     INR  Recent Labs   Lab 07/17/20  0542   INR 1.58*   aPTT 31.8    Assessment & Recommendations:   Obed Wiley is a 31 year old male with PMHx of T2DM (insulin use), " obesity s/p Yan-en-Y gastric bypass, history of alcoholic cirrhosis with complications (ascites, HE, SBP, EV, coagulopathy, and thrombocytopenia), history of heterozygous protein C deficiency and 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 Hematology consulted for anticoagulant management.    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 Patient has history that predispose him to develop both thrombotic (multiple VTEs, heterozygous protein C deficiency, anticoagulant deficiency from impaired synthetic function) and bleeding complications (coagulopathy and thrombocytopenia secondary to cirrhosis, past history of EV with GIB).  Has been on multiple regimens of anticoagulant in the past and was on Lovenox 1 mg/kg daily since 01/2020. Since patient develop new thrombosis, we recommend to increase LMWH to therapeutic dose. If patient has low antithrombin level, which can be found in cirrhosis, this can decrease the effect of LMWH and we would recommend to monitor anti-Xa level.  - Agree with therapeutic heparin per primary team  - For anticoagulant transition, we recommend Lovenox 1.5 mg/kg subcutaneous daily, this can be started at 2 hours after discontinuation of heparin infusion. Timing of transition off heparin per primary/GI team.  - Check antithrombin level tomorrow (ordered for you). Patient with cirrhosis can have low antithrombin that cause LMWH effect to be subtherapeutic with standard dose. If antithrombin level is low, will need to check anti-Xa level at 4-6 hours after 4th dose of Lovenox.     We will continue to follow this patient while hospitalized. Please do not hesitate to page with any questions or concerns.    Patient was seen and plan of care  was discussed with attending physician Dr. Matilde Vaca.    Maddie Rivera MD  Internal Medicine PGY-2  [facility]  Pager: [phone]     Right arm;

## 2023-02-06 NOTE — ASU PATIENT PROFILE, ADULT - HEALTHCARE INFORMATION NEEDED, PROFILE
patient encouraged to ask questions and verbalize fears/concerns.  taught verbal pain scale Muscle Hinge Flap Text: The defect edges were debeveled with a #15 scalpel blade.  Given the size, depth and location of the defect and the proximity to free margins a muscle hinge flap was deemed most appropriate.  Using a sterile surgical marker, an appropriate hinge flap was drawn incorporating the defect. The area thus outlined was incised with a #15 scalpel blade.  The skin margins were undermined to an appropriate distance in all directions utilizing iris scissors.

## 2023-10-03 ENCOUNTER — APPOINTMENT (OUTPATIENT)
Dept: UROLOGY | Facility: CLINIC | Age: 88
End: 2023-10-03

## 2024-02-27 NOTE — PROGRESS NOTE ADULT - PROVIDER SPECIALTY LIST ADULT
65-year-old  male presents the ER with complaints of pain and swelling to the right foot.  Patient has a chronic wound on the right foot that has been treated by myself in clinic multiple times.  The wound was healing over with certain skin products, but patient states recently started having severe pain at the site with radiation to the right 5th toe.  He has had redness across the foot swelling.  X-rays performed showed no evidence of osteomyelitis.  Past medical history of peripheral vascular disease with multiple amputations to bilateral feet.  Diabetic ulcers chronic, diabetic peripheral neuropathy, COPD/emphysema, hypertension, hyperlipidemia, coronary artery disease   Urology

## 2024-03-12 ENCOUNTER — EMERGENCY (EMERGENCY)
Facility: HOSPITAL | Age: 89
LOS: 1 days | Discharge: HOME | End: 2024-03-12
Attending: STUDENT IN AN ORGANIZED HEALTH CARE EDUCATION/TRAINING PROGRAM
Payer: COMMERCIAL

## 2024-03-12 VITALS
HEART RATE: 76 BPM | RESPIRATION RATE: 17 BRPM | WEIGHT: 188.05 LBS | DIASTOLIC BLOOD PRESSURE: 66 MMHG | OXYGEN SATURATION: 97 % | TEMPERATURE: 98 F | HEIGHT: 67 IN | SYSTOLIC BLOOD PRESSURE: 139 MMHG

## 2024-03-12 VITALS
RESPIRATION RATE: 18 BRPM | OXYGEN SATURATION: 96 % | SYSTOLIC BLOOD PRESSURE: 152 MMHG | DIASTOLIC BLOOD PRESSURE: 85 MMHG | HEART RATE: 67 BPM

## 2024-03-12 DIAGNOSIS — Z90.49 ACQUIRED ABSENCE OF OTHER SPECIFIED PARTS OF DIGESTIVE TRACT: Chronic | ICD-10-CM

## 2024-03-12 DIAGNOSIS — Z98.890 OTHER SPECIFIED POSTPROCEDURAL STATES: Chronic | ICD-10-CM

## 2024-03-12 DIAGNOSIS — M95.0 ACQUIRED DEFORMITY OF NOSE: Chronic | ICD-10-CM

## 2024-03-12 LAB
ANION GAP SERPL CALC-SCNC: 3 MMOL/L — LOW (ref 5–17)
APPEARANCE UR: CLEAR — SIGNIFICANT CHANGE UP
BASE EXCESS BLDV CALC-SCNC: 6 MMOL/L — SIGNIFICANT CHANGE UP
BASOPHILS # BLD AUTO: 0.15 K/UL — SIGNIFICANT CHANGE UP (ref 0–0.2)
BASOPHILS NFR BLD AUTO: 1.9 % — SIGNIFICANT CHANGE UP (ref 0–2)
BILIRUB UR-MCNC: NEGATIVE — SIGNIFICANT CHANGE UP
BLOOD GAS COMMENTS, VENOUS: SIGNIFICANT CHANGE UP
BUN SERPL-MCNC: 16 MG/DL — SIGNIFICANT CHANGE UP (ref 7–18)
CALCIUM SERPL-MCNC: 8.8 MG/DL — SIGNIFICANT CHANGE UP (ref 8.4–10.5)
CHLORIDE SERPL-SCNC: 107 MMOL/L — SIGNIFICANT CHANGE UP (ref 96–108)
CK SERPL-CCNC: 136 U/L — SIGNIFICANT CHANGE UP (ref 35–232)
CO2 SERPL-SCNC: 29 MMOL/L — SIGNIFICANT CHANGE UP (ref 22–31)
COLOR SPEC: YELLOW — SIGNIFICANT CHANGE UP
CREAT SERPL-MCNC: 1.08 MG/DL — SIGNIFICANT CHANGE UP (ref 0.5–1.3)
DIFF PNL FLD: NEGATIVE — SIGNIFICANT CHANGE UP
EGFR: 66 ML/MIN/1.73M2 — SIGNIFICANT CHANGE UP
EOSINOPHIL # BLD AUTO: 0.19 K/UL — SIGNIFICANT CHANGE UP (ref 0–0.5)
EOSINOPHIL NFR BLD AUTO: 2.4 % — SIGNIFICANT CHANGE UP (ref 0–6)
FLUAV AG NPH QL: SIGNIFICANT CHANGE UP
FLUBV AG NPH QL: SIGNIFICANT CHANGE UP
GLUCOSE SERPL-MCNC: 100 MG/DL — HIGH (ref 70–99)
GLUCOSE UR QL: NEGATIVE MG/DL — SIGNIFICANT CHANGE UP
HCO3 BLDV-SCNC: 31 MMOL/L — HIGH (ref 22–29)
HCT VFR BLD CALC: 38.5 % — LOW (ref 39–50)
HGB BLD-MCNC: 12.8 G/DL — LOW (ref 13–17)
HOROWITZ INDEX BLDV+IHG-RTO: 21 — SIGNIFICANT CHANGE UP
IMM GRANULOCYTES NFR BLD AUTO: 0.4 % — SIGNIFICANT CHANGE UP (ref 0–0.9)
KETONES UR-MCNC: NEGATIVE MG/DL — SIGNIFICANT CHANGE UP
LACTATE SERPL-SCNC: 1 MMOL/L — SIGNIFICANT CHANGE UP (ref 0.7–2)
LEUKOCYTE ESTERASE UR-ACNC: NEGATIVE — SIGNIFICANT CHANGE UP
LIDOCAIN IGE QN: 35 U/L — SIGNIFICANT CHANGE UP (ref 13–75)
LYMPHOCYTES # BLD AUTO: 1.34 K/UL — SIGNIFICANT CHANGE UP (ref 1–3.3)
LYMPHOCYTES # BLD AUTO: 16.9 % — SIGNIFICANT CHANGE UP (ref 13–44)
MAGNESIUM SERPL-MCNC: 1.9 MG/DL — SIGNIFICANT CHANGE UP (ref 1.6–2.6)
MCHC RBC-ENTMCNC: 32.7 PG — SIGNIFICANT CHANGE UP (ref 27–34)
MCHC RBC-ENTMCNC: 33.2 GM/DL — SIGNIFICANT CHANGE UP (ref 32–36)
MCV RBC AUTO: 98.5 FL — SIGNIFICANT CHANGE UP (ref 80–100)
MONOCYTES # BLD AUTO: 0.49 K/UL — SIGNIFICANT CHANGE UP (ref 0–0.9)
MONOCYTES NFR BLD AUTO: 6.2 % — SIGNIFICANT CHANGE UP (ref 2–14)
NEUTROPHILS # BLD AUTO: 5.72 K/UL — SIGNIFICANT CHANGE UP (ref 1.8–7.4)
NEUTROPHILS NFR BLD AUTO: 72.2 % — SIGNIFICANT CHANGE UP (ref 43–77)
NITRITE UR-MCNC: NEGATIVE — SIGNIFICANT CHANGE UP
NRBC # BLD: 0 /100 WBCS — SIGNIFICANT CHANGE UP (ref 0–0)
NT-PROBNP SERPL-SCNC: 217 PG/ML — SIGNIFICANT CHANGE UP (ref 0–450)
PCO2 BLDV: 43 MMHG — SIGNIFICANT CHANGE UP (ref 42–55)
PH BLDV: 7.46 — HIGH (ref 7.32–7.43)
PH UR: 7 — SIGNIFICANT CHANGE UP (ref 5–8)
PLATELET # BLD AUTO: 209 K/UL — SIGNIFICANT CHANGE UP (ref 150–400)
PO2 BLDV: 116 MMHG — SIGNIFICANT CHANGE UP
POTASSIUM SERPL-MCNC: 4.2 MMOL/L — SIGNIFICANT CHANGE UP (ref 3.5–5.3)
POTASSIUM SERPL-SCNC: 4.2 MMOL/L — SIGNIFICANT CHANGE UP (ref 3.5–5.3)
PROT UR-MCNC: NEGATIVE MG/DL — SIGNIFICANT CHANGE UP
RBC # BLD: 3.91 M/UL — LOW (ref 4.2–5.8)
RBC # FLD: 12.6 % — SIGNIFICANT CHANGE UP (ref 10.3–14.5)
SAO2 % BLDV: 95.6 % — SIGNIFICANT CHANGE UP
SARS-COV-2 RNA SPEC QL NAA+PROBE: SIGNIFICANT CHANGE UP
SODIUM SERPL-SCNC: 139 MMOL/L — SIGNIFICANT CHANGE UP (ref 135–145)
SP GR SPEC: 1.01 — SIGNIFICANT CHANGE UP (ref 1–1.03)
TSH SERPL-MCNC: 1.55 UU/ML — SIGNIFICANT CHANGE UP (ref 0.34–4.82)
UROBILINOGEN FLD QL: 1 MG/DL — SIGNIFICANT CHANGE UP (ref 0.2–1)
WBC # BLD: 7.92 K/UL — SIGNIFICANT CHANGE UP (ref 3.8–10.5)
WBC # FLD AUTO: 7.92 K/UL — SIGNIFICANT CHANGE UP (ref 3.8–10.5)

## 2024-03-12 PROCEDURE — 82962 GLUCOSE BLOOD TEST: CPT

## 2024-03-12 PROCEDURE — 87637 SARSCOV2&INF A&B&RSV AMP PRB: CPT

## 2024-03-12 PROCEDURE — 82803 BLOOD GASES ANY COMBINATION: CPT

## 2024-03-12 PROCEDURE — 83690 ASSAY OF LIPASE: CPT

## 2024-03-12 PROCEDURE — 83735 ASSAY OF MAGNESIUM: CPT

## 2024-03-12 PROCEDURE — 74177 CT ABD & PELVIS W/CONTRAST: CPT | Mod: 26,MC

## 2024-03-12 PROCEDURE — 93005 ELECTROCARDIOGRAM TRACING: CPT

## 2024-03-12 PROCEDURE — 83880 ASSAY OF NATRIURETIC PEPTIDE: CPT

## 2024-03-12 PROCEDURE — 99285 EMERGENCY DEPT VISIT HI MDM: CPT | Mod: 25

## 2024-03-12 PROCEDURE — 84443 ASSAY THYROID STIM HORMONE: CPT

## 2024-03-12 PROCEDURE — 85025 COMPLETE CBC W/AUTO DIFF WBC: CPT

## 2024-03-12 PROCEDURE — 96374 THER/PROPH/DIAG INJ IV PUSH: CPT | Mod: XU

## 2024-03-12 PROCEDURE — 74177 CT ABD & PELVIS W/CONTRAST: CPT | Mod: MC

## 2024-03-12 PROCEDURE — 36415 COLL VENOUS BLD VENIPUNCTURE: CPT

## 2024-03-12 PROCEDURE — 99285 EMERGENCY DEPT VISIT HI MDM: CPT

## 2024-03-12 PROCEDURE — 80048 BASIC METABOLIC PNL TOTAL CA: CPT

## 2024-03-12 PROCEDURE — 81003 URINALYSIS AUTO W/O SCOPE: CPT

## 2024-03-12 PROCEDURE — 82550 ASSAY OF CK (CPK): CPT

## 2024-03-12 PROCEDURE — 83605 ASSAY OF LACTIC ACID: CPT

## 2024-03-12 RX ORDER — SODIUM CHLORIDE 9 MG/ML
1000 INJECTION INTRAMUSCULAR; INTRAVENOUS; SUBCUTANEOUS ONCE
Refills: 0 | Status: COMPLETED | OUTPATIENT
Start: 2024-03-12 | End: 2024-03-12

## 2024-03-12 RX ORDER — MORPHINE SULFATE 50 MG/1
2 CAPSULE, EXTENDED RELEASE ORAL ONCE
Refills: 0 | Status: DISCONTINUED | OUTPATIENT
Start: 2024-03-12 | End: 2024-03-12

## 2024-03-12 RX ADMIN — MORPHINE SULFATE 2 MILLIGRAM(S): 50 CAPSULE, EXTENDED RELEASE ORAL at 20:52

## 2024-03-12 RX ADMIN — SODIUM CHLORIDE 1000 MILLILITER(S): 9 INJECTION INTRAMUSCULAR; INTRAVENOUS; SUBCUTANEOUS at 19:03

## 2024-03-12 NOTE — ED ADULT TRIAGE NOTE - CHIEF COMPLAINT QUOTE
patient states he does not feel well generalized body aches, dizziness, abdominal pain x 1 month 1/2

## 2024-03-12 NOTE — ED ADULT NURSE NOTE - ED STAT RN HANDOFF DETAILS
What Is The Reason For Today's Visit?: Full Body Skin Examination
What Is The Reason For Today's Visit? (Being Monitored For X): concerning skin lesions on a periodic basis
Pt was endorsed to ADAM Arrieta for continuity of care.

## 2024-03-12 NOTE — ED PROVIDER NOTE - OBJECTIVE STATEMENT
88 M presents to ED for complaint of weakness x 6 weeks. Patient states he does not feel well generalized body aches, dizziness, abdominal pain x 1 month 1/2.     GENERAL APPEARANCE:  AxOx4, generally well-appearing, no acute distress.  HEENT:  NC, AT. MMM. EOMI, clear conjunctiva, oropharynx clear.  NECK:  Supple without lymphadenopathy.  No stiffness or restricted ROM.  HEART:  Normal rate and regular rhythm, normal S1/S1, no m/r/g  LUNGS:  CTAB, moving air well. No crackles or wheezes are heard.  ABDOMEN:  Soft, nontender, nondistended with good bowel sounds heard.  BACK: No CVAT, no obvious deformity.  EXTREMITIES:  Without cyanosis, clubbing or edema.  NEUROLOGICAL:  Grossly nonfocal. Alert and oriented, moving all 4 extremities. CN II-XII grossly intact. Observed to ambulate with normal gait.  Skin:  Warm and dry without any rash. 88 M presents to ED for complaint of weakness x 6 weeks. Patient states he does not feel well generalized body aches, dizziness, abdominal pain x 1 month 1/2.   Reports intermittent back and lower abd pain. States he took Tylenol and Aspirin with no relief. Reports he waited for symptoms resolved but came to ED because they persisted. States he has band like pain across lower back and abdomen that he describes as tightness.     Pt denies fever, chills, headache, dizziness, blurred vision, hearing disturbance, chest pain, SOB, abdominal pain, nausea, vomiting, diarrhea, hematochezia, melena, dysuria, hematuria.        GENERAL APPEARANCE:  AxOx4, generally well-appearing, no acute distress.  HEENT:  NC, AT. MMM. EOMI, clear conjunctiva, oropharynx clear.  NECK:  Supple without lymphadenopathy.  No stiffness or restricted ROM.  HEART:  Normal rate and regular rhythm, normal S1/S1, no m/r/g  LUNGS:  CTAB, moving air well. No crackles or wheezes are heard.  ABDOMEN:  Soft, nontender, nondistended with good bowel sounds heard.  BACK: No CVAT, no obvious deformity.  EXTREMITIES:  Without cyanosis, clubbing or edema.  NEUROLOGICAL:  Grossly nonfocal. Alert and oriented, moving all 4 extremities. CN II-XII grossly intact. Observed to ambulate with normal gait.  Skin:  Warm and dry without any rash.

## 2024-03-12 NOTE — ED PROVIDER NOTE - PATIENT PORTAL LINK FT
You can access the FollowMyHealth Patient Portal offered by [facility] by registering at the following website: [email or web address]. By joining [employer]’s FollowMyHealth portal, you will also be able to view your health information using other applications (apps) compatible with our system.

## 2024-03-12 NOTE — ED PROVIDER NOTE - CLINICAL SUMMARY MEDICAL DECISION MAKING FREE TEXT BOX
After introducing myself to the patient and/or family I have performed a medical history, review of systems, and physical examination. I have formulated a differential diagnosis and plan of care for this visit and discussed this with the patient and/or family. I have also addressed the risks and benefits of diagnostic and treatment modalities planned for this visit.  Vital Signs: Reviewed the patient's vital signs  Nursing Notes: Reviewed and utilized the nursing notes  Old Medical Records: The patient's available past medical records and past encounters were reviewed  Laboratory Studies: Ordered and independently interpreted laboratory test, see above  Imaging Studies: Imaging studies ordered. Radiologist interpretation reviewed. I have independently reviewed imaging.    This patient presents with back pain most consistent with _. Differential diagnoses includes lumbago versus musculoskeletal spasm / strain versus sciatica. Less likely sciatica as straight leg raise test was negative. No back pain red flags on history or physical. Presentation not consistent with malignancy (lack of history of malignancy, lack of B symptoms), fracture (no trauma, no bony tenderness to palpation), cauda equina (no bowel or urinary incontinence/retention, no saddle anesthesia, no distal weakness), AAA, viscus perforation, osteomyelitis or epidural abscess (no IVDU, vertebral tenderness), renal colic, pyelonephritis (afebrile, no CVAT, no urinary symptoms). Given the clinical picture, no indication for imaging at this time. After introducing myself to the patient and/or family I have performed a medical history, review of systems, and physical examination. I have formulated a differential diagnosis and plan of care for this visit and discussed this with the patient and/or family. I have also addressed the risks and benefits of diagnostic and treatment modalities planned for this visit.  Vital Signs: Reviewed the patient's vital signs  Nursing Notes: Reviewed and utilized the nursing notes  Old Medical Records: The patient's available past medical records and past encounters were reviewed  Laboratory Studies: Ordered and independently interpreted laboratory test, see above  Imaging Studies: Imaging studies ordered. Radiologist interpretation reviewed. I have independently reviewed imaging.    No evidence of kidney stone urine infection or catastrophic abdominal pathology.  Pain appears to be musculoskeletal in nature.    This patient presents with back pain most consistent with muscular pain. Differential diagnoses includes lumbago versus musculoskeletal spasm / strain versus sciatica. Less likely sciatica as straight leg raise test was negative. No back pain red flags on history or physical. Presentation not consistent with malignancy (lack of history of malignancy, lack of B symptoms), fracture (no trauma, no bony tenderness to palpation), cauda equina (no bowel or urinary incontinence/retention, no saddle anesthesia, no distal weakness), AAA, viscus perforation, osteomyelitis or epidural abscess (no IVDU, vertebral tenderness), renal colic, pyelonephritis (afebrile, no CVAT, no urinary symptoms). Given the clinical picture, no indication for imaging at this time.

## 2024-03-12 NOTE — ED ADULT NURSE NOTE - OBJECTIVE STATEMENT
Pt presents to the ED c/o lower back for a couple days as per Pt. Pt denies trauma/injury/falling. No bruising or abrasions noted to the back area. Pt presents to the ED c/o lower back pain for a couple days as per Pt. Pt denies trauma/injury/falling. No bruising or abrasions noted to the back area.

## 2024-03-12 NOTE — ED ADULT NURSE NOTE - NSFALLHARMRISKINTERV_ED_ALL_ED

## 2024-03-13 RX ORDER — METHOCARBAMOL 500 MG/1
1 TABLET, FILM COATED ORAL
Qty: 9 | Refills: 0
Start: 2024-03-13 | End: 2024-03-15

## 2024-03-13 NOTE — ED POST DISCHARGE NOTE - DETAILS
patient called back bc still with pain and no rx sent. reviewed the chart and dx msk in nature and patient was told he would get an rx for muscle relaxor. sent to pharm preferred.

## 2024-05-08 ENCOUNTER — APPOINTMENT (OUTPATIENT)
Dept: UROLOGY | Facility: CLINIC | Age: 89
End: 2024-05-08
Payer: MEDICARE

## 2024-05-08 VITALS — TEMPERATURE: 98.8 F | OXYGEN SATURATION: 98 % | HEIGHT: 66 IN | HEART RATE: 86 BPM

## 2024-05-08 VITALS — DIASTOLIC BLOOD PRESSURE: 77 MMHG | SYSTOLIC BLOOD PRESSURE: 163 MMHG

## 2024-05-08 PROCEDURE — 99204 OFFICE O/P NEW MOD 45 MIN: CPT

## 2024-05-08 PROCEDURE — G2211 COMPLEX E/M VISIT ADD ON: CPT

## 2024-05-08 RX ORDER — TAMSULOSIN HYDROCHLORIDE 0.4 MG/1
0.4 CAPSULE ORAL
Qty: 90 | Refills: 3 | Status: DISCONTINUED | COMMUNITY
Start: 1900-01-01 | End: 2024-05-08

## 2024-05-08 RX ORDER — AMOXICILLIN AND CLAVULANATE POTASSIUM 875; 125 MG/1; MG/1
875-125 TABLET, COATED ORAL TWICE DAILY
Qty: 14 | Refills: 0 | Status: COMPLETED | COMMUNITY
Start: 2020-09-14 | End: 2024-05-08

## 2024-05-08 RX ORDER — PHENAZOPYRIDINE 100 MG/1
100 TABLET, FILM COATED ORAL EVERY 8 HOURS
Qty: 30 | Refills: 0 | Status: COMPLETED | COMMUNITY
Start: 2018-03-09 | End: 2024-05-08

## 2024-05-08 NOTE — HISTORY OF PRESENT ILLNESS
[Currently Experiencing ___] :  [unfilled] [Urinary Urgency] : urinary urgency [Urinary Frequency] : urinary frequency [Weak Stream] : weak stream [None] : None [FreeTextEntry1] : Mr. Holden is a very pleasant 88 year old man here today for evaluation of BPH.  He underwent a TURP approximately 4 years ago.  He reports feeling okay since he was here last. Reports that for the past few month he has had worsening urination. Reports that his stream prior was strong and is now weakened. He reports frequent and urgent urination. He continues to take 0.4 mg tamsulosin. Denies any hematuria or dysuria.

## 2024-05-08 NOTE — ASSESSMENT
[FreeTextEntry1] : Mr. Holden is a very pleasant 88 year old man here today for evaluation of BPH.  He underwent a TURP approximately 4 years ago.  He reports feeling okay since he was here last. Reports that for the past few month he has had worsening urination. Reports that his stream prior was strong and is now weakened. He reports frequent and urgent urination. He continues to take 0.4 mg tamsulosin. Denies any hematuria or dysuria. CT images from 03/2024 shows enlarged prostate. We discussed the option to start a 5 alpha reductase inhibitor at this time.  He wishes to minimize the number of medications that he takes and only take 1 medication at this time.  We will start Elizabeth.  We discussed the possibility that this will not be covered by his insurance and he would need to take tamsulosin in combination with dutasteride or finasteride. UC. UA. RTO cystoscopy given history of TURP 4 years ago with new bothersome symptoms  Patient is being seen today for evaluation and management of a chronic and longitudinal ongoing condition and I am of the primary treating physician

## 2024-05-09 LAB
APPEARANCE: CLEAR
BACTERIA: NEGATIVE /HPF
BILIRUBIN URINE: NEGATIVE
BLOOD URINE: NEGATIVE
CAST: 0 /LPF
COLOR: YELLOW
EPITHELIAL CELLS: 0 /HPF
GLUCOSE QUALITATIVE U: NEGATIVE MG/DL
KETONES URINE: NEGATIVE MG/DL
LEUKOCYTE ESTERASE URINE: NEGATIVE
MICROSCOPIC-UA: NORMAL
NITRITE URINE: NEGATIVE
PH URINE: 5.5
PROTEIN URINE: NORMAL MG/DL
RED BLOOD CELLS URINE: 1 /HPF
SPECIFIC GRAVITY URINE: 1.03
UROBILINOGEN URINE: 0.2 MG/DL
WHITE BLOOD CELLS URINE: 0 /HPF

## 2024-05-13 DIAGNOSIS — N39.0 URINARY TRACT INFECTION, SITE NOT SPECIFIED: ICD-10-CM

## 2024-05-13 RX ORDER — TAMSULOSIN HYDROCHLORIDE 0.4 MG/1
0.4 CAPSULE ORAL
Qty: 90 | Refills: 1 | Status: ACTIVE | COMMUNITY
Start: 2024-05-13 | End: 1900-01-01

## 2024-05-13 RX ORDER — DUTASTERIDE AND TAMSULOSIN HYDROCHLORIDE .5; .4 MG/1; MG/1
0.5-0.4 CAPSULE ORAL
Qty: 90 | Refills: 1 | Status: DISCONTINUED | COMMUNITY
Start: 2024-05-08 | End: 2024-05-13

## 2024-05-21 ENCOUNTER — APPOINTMENT (OUTPATIENT)
Dept: UROLOGY | Facility: CLINIC | Age: 89
End: 2024-05-21
Payer: MEDICARE

## 2024-05-21 VITALS
HEIGHT: 66 IN | TEMPERATURE: 97.7 F | WEIGHT: 185 LBS | HEART RATE: 86 BPM | OXYGEN SATURATION: 98 % | BODY MASS INDEX: 29.73 KG/M2 | DIASTOLIC BLOOD PRESSURE: 75 MMHG | SYSTOLIC BLOOD PRESSURE: 153 MMHG

## 2024-05-21 DIAGNOSIS — R35.0 FREQUENCY OF MICTURITION: ICD-10-CM

## 2024-05-21 PROCEDURE — 99214 OFFICE O/P EST MOD 30 MIN: CPT

## 2024-05-21 PROCEDURE — G2211 COMPLEX E/M VISIT ADD ON: CPT

## 2024-05-21 NOTE — ASSESSMENT
[FreeTextEntry1] : 88-year-old gentleman who presents for follow-up of increased urinary frequency, urinary urgency, BPH status post TURP -Patient refusing cystoscopy today -We discussed potential etiologies of his symptoms, including a urethral stricture, BPH, tumors including cancer.  He understands these risks and wishes to forego cystoscopy -Urine culture demonstrates a urinary tract infection for which she completed a course of Bactrim -Continue tamsulosin -Trial of mirabegron -We discussed the risks and benefits of mirabegron, including the need to check blood pressure more frequently when for starting the medication -Follow-up in 1 month -Urinalysis demonstrates 1 red blood cell per high-power field  Patient is being seen today for evaluation and management of a chronic and longitudinal ongoing condition and I am of the primary treating physician

## 2024-05-21 NOTE — HISTORY OF PRESENT ILLNESS
[FreeTextEntry1] : Very pleasant 88-year-old gentleman who presents for follow-up of BPH, increased urinary frequency, urinary urgency.  At last visit he was started on Elizabeth, however he reports dizziness with the medication and therefore stopped taking it.  He continues tamsulosin at this time.  He was also treated for urinary tract infection.  He was scheduled to undergo a cystoscopy today, however reports that he does not wish to have the test performed because he believes that his "channel is narrow"

## 2024-06-25 ENCOUNTER — APPOINTMENT (OUTPATIENT)
Dept: UROLOGY | Facility: CLINIC | Age: 89
End: 2024-06-25
Payer: MEDICARE

## 2024-06-25 VITALS
OXYGEN SATURATION: 98 % | WEIGHT: 185 LBS | DIASTOLIC BLOOD PRESSURE: 74 MMHG | SYSTOLIC BLOOD PRESSURE: 139 MMHG | TEMPERATURE: 97.8 F | HEART RATE: 85 BPM | BODY MASS INDEX: 29.73 KG/M2 | HEIGHT: 66 IN

## 2024-06-25 DIAGNOSIS — N13.8 BENIGN PROSTATIC HYPERPLASIA WITH LOWER URINARY TRACT SYMPMS: ICD-10-CM

## 2024-06-25 DIAGNOSIS — R39.15 URGENCY OF URINATION: ICD-10-CM

## 2024-06-25 DIAGNOSIS — N40.1 BENIGN PROSTATIC HYPERPLASIA WITH LOWER URINARY TRACT SYMPMS: ICD-10-CM

## 2024-06-25 PROCEDURE — G2211 COMPLEX E/M VISIT ADD ON: CPT

## 2024-06-25 PROCEDURE — 99214 OFFICE O/P EST MOD 30 MIN: CPT

## 2024-06-25 RX ORDER — SULFAMETHOXAZOLE AND TRIMETHOPRIM 800; 160 MG/1; MG/1
800-160 TABLET ORAL
Qty: 14 | Refills: 0 | Status: DISCONTINUED | COMMUNITY
Start: 2024-05-13 | End: 2024-06-25

## 2024-06-25 RX ORDER — SOLIFENACIN SUCCINATE 10 MG/1
10 TABLET ORAL
Qty: 30 | Refills: 1 | Status: DISCONTINUED | COMMUNITY
Start: 2024-05-22 | End: 2024-06-25

## 2024-07-31 NOTE — ED PROVIDER NOTE - NS ED MD DISPO DISCHARGE
-- DO NOT REPLY / DO NOT REPLY ALL --  -- This inbox is not monitored. If this was sent to the wrong provider or department, reroute message to  Allmoxyoute pool. --  -- Message is from Engagement Center Operations (ECO) --      Message Type:  Refill Medication   Refill request for Pended medication named:     semaglutide-Weight Management (WEGOVY) 0.5 MG/0.5ML injection     Preferred pharmacy verified, and selected.   [id number] - [address]    Is the patient OUT of Medication?  Yes and Medication Refills handled by Practice Site        Message: Patient doesn't know what MG she is currently on                   
Home

## 2024-08-27 ENCOUNTER — APPOINTMENT (OUTPATIENT)
Dept: UROLOGY | Facility: CLINIC | Age: 89
End: 2024-08-27

## 2025-01-14 ENCOUNTER — EMERGENCY (EMERGENCY)
Facility: HOSPITAL | Age: 89
LOS: 1 days | Discharge: HOME | End: 2025-01-14
Attending: EMERGENCY MEDICINE
Payer: COMMERCIAL

## 2025-01-14 VITALS
RESPIRATION RATE: 18 BRPM | HEART RATE: 72 BPM | TEMPERATURE: 97 F | DIASTOLIC BLOOD PRESSURE: 81 MMHG | SYSTOLIC BLOOD PRESSURE: 136 MMHG | OXYGEN SATURATION: 97 %

## 2025-01-14 VITALS
OXYGEN SATURATION: 99 % | SYSTOLIC BLOOD PRESSURE: 147 MMHG | DIASTOLIC BLOOD PRESSURE: 71 MMHG | WEIGHT: 193.35 LBS | RESPIRATION RATE: 16 BRPM | HEART RATE: 77 BPM | HEIGHT: 66 IN | TEMPERATURE: 98 F

## 2025-01-14 DIAGNOSIS — Z90.49 ACQUIRED ABSENCE OF OTHER SPECIFIED PARTS OF DIGESTIVE TRACT: Chronic | ICD-10-CM

## 2025-01-14 DIAGNOSIS — M95.0 ACQUIRED DEFORMITY OF NOSE: Chronic | ICD-10-CM

## 2025-01-14 DIAGNOSIS — Z98.890 OTHER SPECIFIED POSTPROCEDURAL STATES: Chronic | ICD-10-CM

## 2025-01-14 LAB
ALBUMIN SERPL ELPH-MCNC: 3.2 G/DL — LOW (ref 3.5–5)
ALP SERPL-CCNC: 66 U/L — SIGNIFICANT CHANGE UP (ref 40–120)
ALT FLD-CCNC: 14 U/L DA — SIGNIFICANT CHANGE UP (ref 10–60)
ANION GAP SERPL CALC-SCNC: 5 MMOL/L — SIGNIFICANT CHANGE UP (ref 5–17)
APPEARANCE UR: CLEAR — SIGNIFICANT CHANGE UP
AST SERPL-CCNC: 12 U/L — SIGNIFICANT CHANGE UP (ref 10–40)
BASOPHILS # BLD AUTO: 0.1 K/UL — SIGNIFICANT CHANGE UP (ref 0–0.2)
BASOPHILS NFR BLD AUTO: 1.4 % — SIGNIFICANT CHANGE UP (ref 0–2)
BILIRUB SERPL-MCNC: 0.5 MG/DL — SIGNIFICANT CHANGE UP (ref 0.2–1.2)
BILIRUB UR-MCNC: NEGATIVE — SIGNIFICANT CHANGE UP
BUN SERPL-MCNC: 19 MG/DL — HIGH (ref 7–18)
CALCIUM SERPL-MCNC: 9.1 MG/DL — SIGNIFICANT CHANGE UP (ref 8.4–10.5)
CHLORIDE SERPL-SCNC: 106 MMOL/L — SIGNIFICANT CHANGE UP (ref 96–108)
CO2 SERPL-SCNC: 27 MMOL/L — SIGNIFICANT CHANGE UP (ref 22–31)
COLOR SPEC: YELLOW — SIGNIFICANT CHANGE UP
CREAT SERPL-MCNC: 1.2 MG/DL — SIGNIFICANT CHANGE UP (ref 0.5–1.3)
DIFF PNL FLD: NEGATIVE — SIGNIFICANT CHANGE UP
EGFR: 58 ML/MIN/1.73M2 — LOW
EOSINOPHIL # BLD AUTO: 0.28 K/UL — SIGNIFICANT CHANGE UP (ref 0–0.5)
EOSINOPHIL NFR BLD AUTO: 4 % — SIGNIFICANT CHANGE UP (ref 0–6)
GLUCOSE SERPL-MCNC: 101 MG/DL — HIGH (ref 70–99)
GLUCOSE UR QL: NEGATIVE MG/DL — SIGNIFICANT CHANGE UP
HCT VFR BLD CALC: 37.8 % — LOW (ref 39–50)
HGB BLD-MCNC: 12.6 G/DL — LOW (ref 13–17)
IMM GRANULOCYTES NFR BLD AUTO: 0.4 % — SIGNIFICANT CHANGE UP (ref 0–0.9)
KETONES UR-MCNC: ABNORMAL MG/DL
LACTATE SERPL-SCNC: 0.9 MMOL/L — SIGNIFICANT CHANGE UP (ref 0.7–2)
LEUKOCYTE ESTERASE UR-ACNC: NEGATIVE — SIGNIFICANT CHANGE UP
LIDOCAIN IGE QN: 35 U/L — SIGNIFICANT CHANGE UP (ref 13–75)
LYMPHOCYTES # BLD AUTO: 1.38 K/UL — SIGNIFICANT CHANGE UP (ref 1–3.3)
LYMPHOCYTES # BLD AUTO: 19.5 % — SIGNIFICANT CHANGE UP (ref 13–44)
MAGNESIUM SERPL-MCNC: 1.9 MG/DL — SIGNIFICANT CHANGE UP (ref 1.6–2.6)
MCHC RBC-ENTMCNC: 33.2 PG — SIGNIFICANT CHANGE UP (ref 27–34)
MCHC RBC-ENTMCNC: 33.3 G/DL — SIGNIFICANT CHANGE UP (ref 32–36)
MCV RBC AUTO: 99.5 FL — SIGNIFICANT CHANGE UP (ref 80–100)
MONOCYTES # BLD AUTO: 0.48 K/UL — SIGNIFICANT CHANGE UP (ref 0–0.9)
MONOCYTES NFR BLD AUTO: 6.8 % — SIGNIFICANT CHANGE UP (ref 2–14)
NEUTROPHILS # BLD AUTO: 4.79 K/UL — SIGNIFICANT CHANGE UP (ref 1.8–7.4)
NEUTROPHILS NFR BLD AUTO: 67.9 % — SIGNIFICANT CHANGE UP (ref 43–77)
NITRITE UR-MCNC: NEGATIVE — SIGNIFICANT CHANGE UP
NRBC # BLD: 0 /100 WBCS — SIGNIFICANT CHANGE UP (ref 0–0)
PH UR: 5 — SIGNIFICANT CHANGE UP (ref 5–8)
PLATELET # BLD AUTO: 183 K/UL — SIGNIFICANT CHANGE UP (ref 150–400)
POTASSIUM SERPL-MCNC: 4.1 MMOL/L — SIGNIFICANT CHANGE UP (ref 3.5–5.3)
POTASSIUM SERPL-SCNC: 4.1 MMOL/L — SIGNIFICANT CHANGE UP (ref 3.5–5.3)
PROT SERPL-MCNC: 6.9 G/DL — SIGNIFICANT CHANGE UP (ref 6–8.3)
PROT UR-MCNC: ABNORMAL MG/DL
RBC # BLD: 3.8 M/UL — LOW (ref 4.2–5.8)
RBC # FLD: 12.8 % — SIGNIFICANT CHANGE UP (ref 10.3–14.5)
SODIUM SERPL-SCNC: 138 MMOL/L — SIGNIFICANT CHANGE UP (ref 135–145)
SP GR SPEC: 1.03 — SIGNIFICANT CHANGE UP (ref 1–1.03)
TROPONIN I, HIGH SENSITIVITY RESULT: 7.4 NG/L — SIGNIFICANT CHANGE UP
UROBILINOGEN FLD QL: 1 MG/DL — SIGNIFICANT CHANGE UP (ref 0.2–1)
WBC # BLD: 7.06 K/UL — SIGNIFICANT CHANGE UP (ref 3.8–10.5)
WBC # FLD AUTO: 7.06 K/UL — SIGNIFICANT CHANGE UP (ref 3.8–10.5)

## 2025-01-14 PROCEDURE — 83735 ASSAY OF MAGNESIUM: CPT

## 2025-01-14 PROCEDURE — 85025 COMPLETE CBC W/AUTO DIFF WBC: CPT

## 2025-01-14 PROCEDURE — 83605 ASSAY OF LACTIC ACID: CPT

## 2025-01-14 PROCEDURE — 96374 THER/PROPH/DIAG INJ IV PUSH: CPT | Mod: XU

## 2025-01-14 PROCEDURE — 83690 ASSAY OF LIPASE: CPT

## 2025-01-14 PROCEDURE — 81001 URINALYSIS AUTO W/SCOPE: CPT

## 2025-01-14 PROCEDURE — 93005 ELECTROCARDIOGRAM TRACING: CPT

## 2025-01-14 PROCEDURE — 80053 COMPREHEN METABOLIC PANEL: CPT

## 2025-01-14 PROCEDURE — 36415 COLL VENOUS BLD VENIPUNCTURE: CPT

## 2025-01-14 PROCEDURE — 84484 ASSAY OF TROPONIN QUANT: CPT

## 2025-01-14 PROCEDURE — 74177 CT ABD & PELVIS W/CONTRAST: CPT | Mod: MC

## 2025-01-14 PROCEDURE — 99285 EMERGENCY DEPT VISIT HI MDM: CPT

## 2025-01-14 PROCEDURE — 99285 EMERGENCY DEPT VISIT HI MDM: CPT | Mod: 25

## 2025-01-14 PROCEDURE — 74177 CT ABD & PELVIS W/CONTRAST: CPT | Mod: 26

## 2025-01-14 RX ORDER — KETOROLAC TROMETHAMINE 30 MG/ML
15 INJECTION INTRAMUSCULAR; INTRAVENOUS ONCE
Refills: 0 | Status: DISCONTINUED | OUTPATIENT
Start: 2025-01-14 | End: 2025-01-14

## 2025-01-14 RX ADMIN — KETOROLAC TROMETHAMINE 15 MILLIGRAM(S): 30 INJECTION INTRAMUSCULAR; INTRAVENOUS at 20:49

## 2025-01-14 NOTE — ED PROVIDER NOTE - CLINICAL SUMMARY MEDICAL DECISION MAKING FREE TEXT BOX
Character, exam, appearance low suspicion for mesenteric ischemia or dissection to warrant CT angio imaging. Character low suspicion for ACS or PE to warrant cardiopulmonary workup, and ECG/troponin _________. UA unremarkable and low suspicion for UTI/pyelonephritis/kidney stone. No evidence of urinary retention. No evidence of torsion to warrant ultrasound imaging. Abdomen entirely benign and nontender, with low suspicion for acute intra-abdominal process, and CT __________. Patient reports a long history of similar symptoms Character, exam, appearance low suspicion for mesenteric ischemia or dissection to warrant CT angio imaging. Character low suspicion for ACS or PE to warrant cardiopulmonary workup, and ECG/troponin unremarkable. UA unremarkable and low suspicion for UTI/pyelonephritis/kidney stone. No evidence of urinary retention. No evidence of torsion to warrant ultrasound imaging. Abdomen entirely benign and nontender, with low suspicion for acute intra-abdominal process, and CT unremarkable. Patient reports a long history of similar symptoms. Significantly improved following Toradol. Patient is well appearing, NAD, afebrile, hemodynamically stable. Any available tests and studies were discussed with patient and wife, including parapelvic cysts. Discharged with instructions in further symptomatic care, return precautions, and need for PMD f/u and given copy of report and instructed in need for getting this to his doctor.

## 2025-01-14 NOTE — ED PROVIDER NOTE - NSFOLLOWUPINSTRUCTIONS_ED_ALL_ED_FT
Liborio un seguimiento con kraus médico de atención primaria en 1-2 días.  Utilice ibuprofeno o acetaminofén según sea necesario para el dolor.  Manténgase alvarez hidratado.  Regrese al departamento de emergencias si el dolor empeora, los vómitos, las heces con pranav, la fiebre o cualquier otro síntoma.    Dolor abdominal marc    LO QUE NECESITA SABER:    No se puede encontrar la causa del dolor abdominal. Si se encuentra grayson causa, el tratamiento dependerá de cuál es jude causa.    INSTRUCCIONES SOBRE EL AMBER HOSPITALARIA:    Regrese a la tata de emergencias si:  •Usted no puede dejar de vomitar o vomita pranav.  •Usted tiene pranav en las evacuaciones intestinales o estas tienen un aspecto alquitranado.  •Usted tiene sangrado por kraus recto.  •El tamaño del abdomen es más shakira de lo normal y se siente corey y más doloroso.  •Tiene dolor abdominal intenso.  •Usted lindsay de tener flatulencias y evacuaciones intestinales.  •Usted se siente mareado, débil o tiene sensación de desmayo.    Comuníquese con kraus médico si:  •Tiene fiebre.  •Tiene nuevos signos y síntomas.  •Ryann síntomas no mejoran con el tratamiento.  •Usted tiene preguntas o inquietudes acerca de kraus condición o cuidado.    Los medicamentosestos pueden administrarse para disminuir el dolor, tratar grayson infección y manejar ryann síntomas. [place] ryann medicamentos dave se le haya indicado. Llame a kraus médico si usted piensa que el medicamento no está ayudando o si tiene efectos secundarios. Infórmele si es alérgico a cualquier medicamento. Mantenga grayson lista actualizada de los medicamentos, las vitaminas y los productos herbales que bc. Incluya los siguientes datos de los medicamentos: cantidad, frecuencia y motivo de administración. Traiga con usted la lista o los envases de las píldoras a ryann citas de seguimiento. Lleve la lista de los medicamentos con usted en kayleigh de grayson emergencia.    El manejo de ryann síntomas:  •Aplique calorsobre el abdomen de 20 a 30 minutos cada 2 horas por los días que le indiquen. El calor ayuda a disminuir el dolor y los espasmos musculares.  •Controle kraus estrés.El estrés puede causar dolor abdominal. Kraus médico puede recomendarle técnicas de relajación y ejercicios de respiración profunda para ayudar a disminuir el estrés. Kraus médico puede recomendarle que hable con alguien sobre kraus estrés o ansiedad, dave un consejero o un amigo de confianza. Duerma lo suficiente y realice ejercicio regularmente.  •Limite o no consuma bebidas alcohólicas.El alcohol puede empeorar el dolor abdominal. Pregunte a kraus médico si usted puede onel alcohol. Pregunte cuanto es la cantidad erwin para usted onel.  •No fume.La nicotina y otros químicos en los cigarrillos pueden dañarle el esófago y el estómago. Pida información a kraus médico si usted actualmente fuma y necesita ayuda para dejar de fumar. Los cigarrillos electrónicos o el tabaco sin humo igualmente contienen nicotina. Consulte con kraus médico antes de utilizar estos productos.    Realice cambios en los alimentos que consume según se le indique:No coma alimentos que causan dolor abdominal u otros síntomas. Ingiera comidas pequeñas, más a menudo.   •Coma más alimentos ricos en fibra si tiene estreñimiento. Los alimentos altos en fibra incluyen frutas, verduras, alimentos de grano integral y legumbres.  •No coma alimentos que causan gas si tiene distensión. Por ejemplo, brócoli, col y coliflor. No violet gaseosas o bebidas carbonadas, ya que también pueden provocarle gases.  •No consuma alimentos o bebidas que contienen sorbitol o fructosa si tiene diarrea y distensión. Algunos ejemplos son jugos de frutas, dulces, mermeladas y gomas de mascar sin azúcar.  •No coma alimentos altos en grasas, dave comidas fritas, hamburguesas con queso, salchichas y postres.  •Limite o no tome cafeína. La cafeína puede empeorar los síntomas, dave la acidez o las náuseas.  •Violet suficientes líquidos para evitar la deshidratación causada por la diarrea o los vómitos. Pregunte a kraus médico sobre la cantidad de líquido que necesita onel todos los sindy y cuáles le recomienda.    Acuda a rynan consultas de control con kraus médico según le indicaron.Anote ryann preguntas para que se acuerde de hacerlas anabelle ryann visitas.

## 2025-01-14 NOTE — ED PROVIDER NOTE - PHYSICAL EXAMINATION
Afebrile, hemodynamically stable, saturating well on room air  NAD, well appearing, laying comfortably in bed, no WOB/tachypnea, speaking full sentences  Head NCAT  EOMI grossly, anicteric  MMM  No JVD  RRR, nml S1/S2, no m/r/g  Lungs CTAB, no w/r/r  Abd soft, NT, ND, nml BS, no rebound or guarding, no CVAT  AAO, CN's 3-12 grossly intact  WHEATLEY spontaneously, no leg cyanosis or edema or calf tenderness  Skin warm, well perfused, no rashes or hives

## 2025-01-14 NOTE — ED PROVIDER NOTE - OBJECTIVE STATEMENT
Offered and declines , uses himself.  89-year-old male with history of HTN, BPH, herniated disks, presents with wife with report of generalized lower abdominal pain, states sometimes radiates around to his low back, primarily on the right side of the low back, intermittent, no specific exacerbating or alleviating factors, not associated with eating, has not attempted analgesia. States has been present for at least a few weeks. States has been evaluated for the same in the past multiple times. Denies all other symptoms, chest pain, shortness of breath, trauma or fall, fever, vomiting, diarrhea, constipation, change in his baseline urinary habits, testicular symptoms.

## 2025-01-14 NOTE — ED PROVIDER NOTE - PATIENT PORTAL LINK FT
You can access the FollowMyHealth Patient Portal offered by [facility] by registering at the following website: [email or web address]. By joining [facility]’s FollowMyHealth portal, you will also be able to view your health information using other applications (apps) compatible with our system.

## 2025-01-14 NOTE — ED ADULT NURSE NOTE - OBJECTIVE STATEMENT
Patient reports having abdominal and chronic gneralized body pain including back and legs for multiple months. Patient is alert and oriented times three, patient is hard of hearing. Czech speaking. patient abc are intact. patient is not in acute distress. No recent falls.

## 2025-01-14 NOTE — ED ADULT NURSE NOTE - NSFALLUNIVINTERV_ED_ALL_ED
Bed/Stretcher in lowest position, wheels locked, appropriate side rails in place/Call bell, personal items and telephone in reach/Instruct patient to call for assistance before getting out of bed/chair/stretcher/Non-slip footwear applied when patient is off stretcher/[place] to call system/Physically safe environment - no spills, clutter or unnecessary equipment/Purposeful proactive rounding/Room/bathroom lighting operational, light cord in reach

## 2025-03-13 NOTE — ED ADULT NURSE NOTE - CODE STROKE ACTIVE YN
Detail Level: Detailed Add 17637 Cpt? (Important Note: In 2017 The Use Of 02320 Is Being Tracked By Cms To Determine Future Global Period Reimbursement For Global Periods): yes No

## 2025-03-26 NOTE — DIETITIAN INITIAL EVALUATION ADULT. - PROBLEM SELECTOR PLAN 1
----- Message from Stefania sent at 3/26/2025  9:30 AM CDT -----  Type:  Needs Medical AdviceWho Called: Seda MCGRATH SneadSymptoms (please be specific): Seda MCGRATH [place] How long has patient had these symptoms:  -Pharmacy name and phone #:  -Would the patient rather a call back or a response via [facility]? [facility] Call Back Number: [phone] Information: needs to speak w/ nurse about upcoming appt tomorrow please call   p/w Dizziness and  inability to ambulate since 3 days  Nystagmus and Dysmetria is present  - CT head shows No acute intracranial hemorrhage, mass effect, or acute territorial infarction   - EKG showed NSR   - Fall precautions  -f/u  carotid doppler    CTA head and neck is not done due to TRACI  - Pt is allergic to  Aspirin 81mg (Ecotrin), and started on Statins (Lipitor) 40mg HS  -  c/w 24 hr Telemetry monitoring to r/o cardiac arrythmia  - c/w Frequent neurochecks q4  Trop x 1 negative, F/U T2 and T3  f/u Orthostatics q 12  - f/u Echocardiogram    f/u HbA1C, TSH , Vitamin B12 , Folate & Vitamin D  Lipid profile is not done as it was wnl on 10/1  - f/u PT evaluation due to gait instability   possible need for MRA of head and neck    Neurology consulted Dr. Gallego  Pt had prior abnormal CT scan showing Lobectomy but family is not aware of it. Please obtain records from the PMD.
